# Patient Record
Sex: FEMALE | Race: OTHER | HISPANIC OR LATINO
[De-identification: names, ages, dates, MRNs, and addresses within clinical notes are randomized per-mention and may not be internally consistent; named-entity substitution may affect disease eponyms.]

---

## 2019-12-30 ENCOUNTER — APPOINTMENT (OUTPATIENT)
Dept: ORTHOPEDIC SURGERY | Facility: CLINIC | Age: 49
End: 2019-12-30
Payer: COMMERCIAL

## 2019-12-30 VITALS
SYSTOLIC BLOOD PRESSURE: 102 MMHG | BODY MASS INDEX: 22.08 KG/M2 | HEIGHT: 62 IN | WEIGHT: 120 LBS | HEART RATE: 94 BPM | DIASTOLIC BLOOD PRESSURE: 70 MMHG

## 2019-12-30 DIAGNOSIS — Z78.9 OTHER SPECIFIED HEALTH STATUS: ICD-10-CM

## 2019-12-30 DIAGNOSIS — Z82.61 FAMILY HISTORY OF ARTHRITIS: ICD-10-CM

## 2019-12-30 DIAGNOSIS — Z87.09 PERSONAL HISTORY OF OTHER DISEASES OF THE RESPIRATORY SYSTEM: ICD-10-CM

## 2019-12-30 PROCEDURE — 77071 MNL APPL STRS JT RADIOGRAPHY: CPT

## 2019-12-30 PROCEDURE — 27786 TREATMENT OF ANKLE FRACTURE: CPT | Mod: RT

## 2019-12-30 PROCEDURE — 99203 OFFICE O/P NEW LOW 30 MIN: CPT | Mod: 25,57

## 2019-12-30 PROCEDURE — 73610 X-RAY EXAM OF ANKLE: CPT | Mod: RT

## 2019-12-30 NOTE — DATA REVIEWED
[Imaging Present] : Present [de-identified] : History of as well as 3 x-rays today AP lateral and mortise view of the ankle show a distal fibula fracture Hull B. type fracture.\par \par Stress views done today with external rotation shows no obvious instability or significant talar tilt.

## 2019-12-30 NOTE — PHYSICAL EXAM
[FreeTextEntry1] : On exam, the RIGHT ankle has swelling over the lateral malleolus. She has some mild ecchymosis that her 2nd and 3rd toes. She has some minimal tenderness over the medial malleolus and deltoid posteriorly. She has moderate tenderness over the distal fibula itself. Just minimal tenderness over ATFL. She has mild pain with internal and external rotation as well as inversion and eversion. She has no pain proximally. She is neurovascularly intact. [General Appearance - Well Nourished] : well nourished [General Appearance - Well-Appearing] : Well appearing [Impaired Insight] : Insight and judgment were intact [Oriented To Time, Place, And Person] : Oriented to person, place, and time [Mood] : the mood was normal [Affect] : The affect was normal. [Heart Rate And Rhythm] : heart rate was normal and rhythm regular [Sclera] : the sclera and conjunctiva were normal [Neck Cervical Mass (___cm)] : no neck mass was observed [] : No respiratory distress [Antalgic Gait Right] : antalgic on the right [Abdomen Soft] : Soft [Limping On The Right] : limping on the right [Cane] : Uses cane for ambulation [Tenderness] : tenderness [Skin Changes - Describe changes:] : No skin changes noted [Masses] : no masses [Swelling] : swelling [Full ROM Unless otherwise noted:] : Full range of motion unless otherwise noted: [Normal] : No palpable lymph nodes in the inguinal and popliteal beds [LE  Motor Strength Normal unless otherwise noted:] : 5/5 strength in bilateral lower extemities unless otherwise noted. [2+] : right 2+

## 2019-12-30 NOTE — DISCUSSION/SUMMARY
[All Questions Answered] : Patient (and family) had all questions answered to an agreeable level of satisfaction [de-identified] : Is 10 days status post distal fibula fracture. This is a stable fracture\par \par The patient is to stay in the immobilization boot and do some minimal weightbearing for a couple of weeks. If her pain gets better she can start weightbearing. I want to see her again in 3 weeks we can hopefully transition her to an Aircast. This is closed management of a distal fibula fracture without manipulation.\par \par If imaging was ordered, the patient was told to make an appointment to review findings right after all imaging is completed.\par \par We discussed risks, benefits and alternatives. Rationale of care was reviewed and all questions were answered. Patient (and family) had all questions answered to her degree of the level of satisfaction. Patient (and family) expressed understanding and interest in proceeding with the plan as outlined.\par \par \par \par \par This note was done with a voice recognition transcription software and any typos are related to this rather than medical error.\par  [Interested in Proceeding] : Patient (and family) expressed understanding and interest in proceeding with the plan as outlined

## 2019-12-30 NOTE — HISTORY OF PRESENT ILLNESS
[FreeTextEntry1] : This is a 49-year-old female who comes in after a trip down some stairs on December 21. She inverted her ankle certainly significant pain. She was walking on them [facility] later who told her that there were no problems. She went and had significant swelling and went 2 days after this on the 26th and had an x-ray and was told she had a fibula fracture. She then came to me for further evaluation. She is in the boot currently. She is walking with a cane. [Stable] : stable [___ days] : [unfilled] day(s) ago [Walking] : worsened by walking [5] : currently ~his/her~ pain is 5 out of 10 [Joint Movement] : worsened by joint movement

## 2019-12-30 NOTE — REVIEW OF SYSTEMS
[Chills] : no chills [Feeling Tired] : not feeling tired [Fever] : no fever [Joint Pain] : joint pain [Joint Swelling] : joint swelling [Nl] : Hematologic/Lymphatic

## 2020-01-27 ENCOUNTER — APPOINTMENT (OUTPATIENT)
Dept: ORTHOPEDIC SURGERY | Facility: CLINIC | Age: 50
End: 2020-01-27
Payer: COMMERCIAL

## 2020-01-27 PROCEDURE — 99024 POSTOP FOLLOW-UP VISIT: CPT

## 2020-01-27 PROCEDURE — 73610 X-RAY EXAM OF ANKLE: CPT | Mod: RT

## 2020-01-28 NOTE — HISTORY OF PRESENT ILLNESS
[FreeTextEntry1] : Patient is almost 6 weeks out from her injury. She currently feels significantly better. She has been walking around with the boot. [Improving] : improving [1] : currently ~his/her~ pain is 1 out of 10 [Direct Pressure] : worsened by direct pressure [Rest] : relieved by rest

## 2020-01-28 NOTE — DISCUSSION/SUMMARY
[All Questions Answered] : Patient (and family) had all questions answered to an agreeable level of satisfaction [de-identified] : Patient is healed enough to bring her out of the boot and I placed her into an Aircast stirrup splint. She should wear this for the next 2-4 weeks for support. Recommended her to be weightbearing as tolerated and get back to regular activity. She is walking normally again. I will see her back in 2 months for repeat x-ray machine and to be seen sooner.\par \par If imaging was ordered, the patient was told to make an appointment to review findings right after all imaging is completed.\par \par We discussed risks, benefits and alternatives. Rationale of care was reviewed and all questions were answered. Patient (and family) had all questions answered to her degree of the level of satisfaction. Patient (and family) expressed understanding and interest in proceeding with the plan as outlined.\par \par \par \par \par This note was done with a voice recognition transcription software and any typos are related to this rather than medical error. [Interested in Proceeding] : Patient (and family) expressed understanding and interest in proceeding with the plan as outlined

## 2020-01-28 NOTE — DATA REVIEWED
[Imaging Present] : Present [de-identified] : X-rays today 3 views of the RIGHT ankle show the Hull B. minimally displaced distal fibula fracture in good position. Nothing is changed. There is no medial widening. Everything appears intact.

## 2020-01-28 NOTE — REVIEW OF SYSTEMS
[Chills] : no chills [Fever] : no fever [Feeling Tired] : not feeling tired [Joint Pain] : joint pain [Joint Swelling] : joint swelling [Nl] : Hematologic/Lymphatic

## 2020-01-28 NOTE — PHYSICAL EXAM
[FreeTextEntry1] : On exam the patient has good range of motion patient minimal tenderness to reveal the fracture site and this has no pain. She has normal pain with external rotation but no pain medially. She has a stable ankle. She is neurovascularly intact. [Oriented To Time, Place, And Person] : Oriented to person, place, and time [General Appearance - Well Nourished] : well nourished [General Appearance - Well-Appearing] : Well appearing [Affect] : The affect was normal. [Mood] : the mood was normal [Impaired Insight] : Insight and judgment were intact [Limping On The Right] : limping on the right [Sclera] : the sclera and conjunctiva were normal [Swelling] : no swelling [Tenderness] : tenderness [Skin Changes - Describe changes:] : No skin changes noted [Masses] : no masses [Normal] : Palpable DP and PT pulses, warm and pink with brisk capillary refill [Full ROM Unless otherwise noted:] : Full range of motion unless otherwise noted: [LE  Motor Strength Normal unless otherwise noted:] : 5/5 strength in bilateral lower extemities unless otherwise noted. [2+] : right 2+

## 2020-03-30 ENCOUNTER — APPOINTMENT (OUTPATIENT)
Dept: ORTHOPEDIC SURGERY | Facility: CLINIC | Age: 50
End: 2020-03-30

## 2020-06-06 ENCOUNTER — APPOINTMENT (OUTPATIENT)
Dept: OBGYN | Facility: CLINIC | Age: 50
End: 2020-06-06

## 2020-06-27 ENCOUNTER — APPOINTMENT (OUTPATIENT)
Dept: OBGYN | Facility: CLINIC | Age: 50
End: 2020-06-27

## 2020-07-11 ENCOUNTER — APPOINTMENT (OUTPATIENT)
Dept: ANTEPARTUM | Facility: CLINIC | Age: 50
End: 2020-07-11
Payer: COMMERCIAL

## 2020-07-11 PROCEDURE — 99386 PREV VISIT NEW AGE 40-64: CPT

## 2020-08-10 ENCOUNTER — APPOINTMENT (OUTPATIENT)
Dept: OBGYN | Facility: CLINIC | Age: 50
End: 2020-08-10

## 2021-07-01 ENCOUNTER — TRANSCRIPTION ENCOUNTER (OUTPATIENT)
Age: 51
End: 2021-07-01

## 2021-07-30 DIAGNOSIS — R87.810 CERVICAL HIGH RISK HUMAN PAPILLOMAVIRUS (HPV) DNA TEST POSITIVE: ICD-10-CM

## 2021-08-28 ENCOUNTER — LABORATORY RESULT (OUTPATIENT)
Age: 51
End: 2021-08-28

## 2021-08-28 ENCOUNTER — APPOINTMENT (OUTPATIENT)
Dept: OBGYN | Facility: CLINIC | Age: 51
End: 2021-08-28
Payer: COMMERCIAL

## 2021-08-28 VITALS
DIASTOLIC BLOOD PRESSURE: 73 MMHG | HEIGHT: 62 IN | BODY MASS INDEX: 22.45 KG/M2 | SYSTOLIC BLOOD PRESSURE: 109 MMHG | WEIGHT: 122 LBS

## 2021-08-28 DIAGNOSIS — S82.831D OTHER FRACTURE OF UPPER AND LOWER END OF RIGHT FIBULA, SUBSEQUENT ENCOUNTER FOR CLOSED FRACTURE WITH ROUTINE HEALING: ICD-10-CM

## 2021-08-28 DIAGNOSIS — S82.831A OTHER FRACTURE OF UPPER AND LOWER END OF RIGHT FIBULA, INITIAL ENCOUNTER FOR CLOSED FRACTURE: ICD-10-CM

## 2021-08-28 DIAGNOSIS — Z82.49 FAMILY HISTORY OF ISCHEMIC HEART DISEASE AND OTHER DISEASES OF THE CIRCULATORY SYSTEM: ICD-10-CM

## 2021-08-28 PROCEDURE — 99396 PREV VISIT EST AGE 40-64: CPT

## 2021-08-28 RX ORDER — SERTRALINE HYDROCHLORIDE 25 MG/1
TABLET, FILM COATED ORAL
Refills: 0 | Status: DISCONTINUED | COMMUNITY
End: 2021-08-28

## 2021-08-28 NOTE — PHYSICAL EXAM
[Appropriately responsive] : appropriately responsive [Alert] : alert [No Lymphadenopathy] : no lymphadenopathy [No Acute Distress] : no acute distress [Regular Rate Rhythm] : regular rate rhythm [No Murmurs] : no murmurs [Soft] : soft [Clear to Auscultation B/L] : clear to auscultation bilaterally [Non-tender] : non-tender [Non-distended] : non-distended [No HSM] : No HSM [No Lesions] : no lesions [No Mass] : no mass [Oriented x3] : oriented x3 [Examination Of The Breasts] : a normal appearance [No Masses] : no breast masses were palpable [Labia Majora] : normal [Labia Minora] : normal [Normal] : normal [Uterine Adnexae] : normal

## 2021-08-30 PROBLEM — S82.831A CLOSED FRACTURE OF DISTAL END OF RIGHT FIBULA, UNSPECIFIED FRACTURE MORPHOLOGY, INITIAL ENCOUNTER: Status: RESOLVED | Noted: 2019-12-30 | Resolved: 2021-08-30

## 2021-08-30 PROBLEM — S82.831D CLOSED FRACTURE OF DISTAL END OF RIGHT FIBULA WITH ROUTINE HEALING, UNSPECIFIED FRACTURE MORPHOLOGY, SUBSEQUENT ENCOUNTER: Status: RESOLVED | Noted: 2020-01-27 | Resolved: 2021-08-30

## 2021-08-30 LAB
C TRACH RRNA SPEC QL NAA+PROBE: NOT DETECTED
N GONORRHOEA RRNA SPEC QL NAA+PROBE: NOT DETECTED
SOURCE AMPLIFICATION: NORMAL

## 2021-08-30 NOTE — HISTORY OF PRESENT ILLNESS
[FreeTextEntry1] : Patient is a 51 year y/o P1 presenting for an annual visit. Patient has not had a period since August 2020. She endorses irregular periods for 6 months leading up to this. Now she only has occasional spotting. She also complains of mood swings and hot flashes. She is otherwise feeling well. She denies vaginal itching, odor and discharge. Denies urinary urgency, frequency and dysuria.

## 2021-08-30 NOTE — PLAN
[FreeTextEntry1] : 51 year y/o P1 presenting for annual exam. Presentation consistent with patient having entered menopause.\par -uterus normal size and mobile\par -f/u PAP and GC/CT done today\par -requisition given for diagnostic mammogram and bilateral breast sono as patient has dense breast tissue\par -counseled on exercise, Ca, and Vitamin D\par -f/u PRN\par \par =============================================\par I, Joana Arango, acted solely as a scribe for Dr. Royce Goldman on Aug 28 2021 10:30AM. All medical entries made by the Scribe were at my, Dr. Royce Jerez's, direction and personally dictated by me on Aug 28 2021 10:30AM . I have reviewed the chart and agree that the record accurately reflects my personal performance of the history, physical exam, assessment, and plan. I have also personally directed, reviewed, and agreed with the chart.\par =============================================

## 2021-09-09 LAB — CYTOLOGY CVX/VAG DOC THIN PREP: ABNORMAL

## 2021-09-28 DIAGNOSIS — Z00.00 ENCOUNTER FOR GENERAL ADULT MEDICAL EXAMINATION W/OUT ABNORMAL FINDINGS: ICD-10-CM

## 2021-10-26 ENCOUNTER — NON-APPOINTMENT (OUTPATIENT)
Age: 51
End: 2021-10-26

## 2021-11-02 ENCOUNTER — NON-APPOINTMENT (OUTPATIENT)
Age: 51
End: 2021-11-02

## 2021-11-02 ENCOUNTER — APPOINTMENT (OUTPATIENT)
Dept: SURGICAL ONCOLOGY | Facility: CLINIC | Age: 51
End: 2021-11-02
Payer: COMMERCIAL

## 2021-11-02 VITALS
BODY MASS INDEX: 22.45 KG/M2 | OXYGEN SATURATION: 96 % | WEIGHT: 122 LBS | HEART RATE: 77 BPM | DIASTOLIC BLOOD PRESSURE: 73 MMHG | SYSTOLIC BLOOD PRESSURE: 112 MMHG | HEIGHT: 62 IN | RESPIRATION RATE: 16 BRPM | TEMPERATURE: 96.7 F

## 2021-11-02 PROCEDURE — 99204 OFFICE O/P NEW MOD 45 MIN: CPT

## 2021-11-02 NOTE — PHYSICAL EXAM
[Normal] : supple, no neck mass and thyroid not enlarged [Normal Supraclavicular Lymph Nodes] : normal supraclavicular lymph nodes [Normal Axillary Lymph Nodes] : normal axillary lymph nodes [Normal] : oriented to person, place and time, with appropriate affect [FreeTextEntry1] : AB present during exam  [de-identified] : Normal S1, S2.  Regular rate and rhythm.  [de-identified] : Complete breast exam performed in supine and upright positions.  No palpable masses, tenderness, nipple discharge, inversion, deviation or enlarged axillary or supraclavicular lymph nodes bilaterally.  [de-identified] : Clear breath sounds bilaterally, normal respiratory effort.

## 2021-11-02 NOTE — HISTORY OF PRESENT ILLNESS
[de-identified] :  51 year old female presents for an initial consultation, referred by Dr. Royce Goldman.\par \par A routine mammo performed on 7/17/21 revealed a spiculated mass in the right breast at the 12:00, 5 cmFN. Spot compressions views and target sonography recommended to determine if this mass is visualized sonographically unremarkable amenable to a sonographic guided core biopsy. If this is not identified sonographically then stereotactic biopsy recommended for definitive histologic diagnosis. BIRADS 0. \par \par On 09/17/21 she underwent a Diagnostic mammo and US which revealed persistent spiculated like mass in the right breast at about 12:00 corresponding to a 1.5 cm irregular hypoechoic nodule with posterior shadowing on ultrasound.  There is a 1.3 cm hypoechoic nodule questionable irregularity 10:00 right breast 2 cmFN. Multiple other small nodules bilaterally suggesting complicated and simple cyst. Bilateral mild ductal ectasia retroareolar region. Biopsy recommended. BIRADS 5.  Short term follow up bilateral ultrasound recommended to assess stability of additional nodules.\par \par 2 US guided core biopsies were performed on 10/19/21 which revealed:\par 1) Right breast 12:00 3 cmFN: invasive mammary carcinoma, moderately differentiated, spanning at least 9 mm in this material. Primary carcinoma in situ with apocrine features. \par 2) Right breast 10:00 2 cmFN: Invasive mammary carcinoma, moderately differentiated, spanning at least 7 mm in this material. 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 Lymphovascular invasion is identified.  \par ***ER/MD/HER2 status pending***\par \par She has a prior history of 2 benign core biopsies of the breast.  Her past medical history includes anxiety and asthma.  She has a family history of breast cancer in a paternal first cousin in her 30's and maternal first cousin in her 50's.  She currently takes OTC estroven for vasomotor symptoms.  She states that she had not had a menstrual period for over a year, but got her period after the recent biopsy.\par \par She denies any palpable breast masses, nipple discharge, inversion or skin change.

## 2021-11-02 NOTE — ASSESSMENT
[FreeTextEntry1] : IMP:\par Newly diagnosed invasive mammary carcinoma of the right breast x 2 sites (12:00, N+3/ 10:00, N+2)\par ER/RI/HER2 status pending\par \par PLAN:\par The patient and I discussed the surgical management of breast cancer. I explained that breast cancer can be treated with 2 main surgical approaches. One is breast conserving therapy. The other is mastectomy with or without immediate reconstruction by a plastic surgeon. Breast conserving therapy involves wide excision of the involved area. Negative margins must be achieved with this wide excision. In addition, evaluation of the lymph nodes either with a sentinel lymph node biopsy or an axillary lymph node dissection may be required. This treatment usually requires postoperative radiation to the breast. Treatment with mastectomy also involves evaluation of the lymph node with a sentinel lymph node biopsy or axillary lymph node dissection. Post operative radiation therapy may be needed even after mastectomy in certain advanced cases.\par \par Breast MRI\par Genetic testing\par 6 month follow up ultrasound to assess stability of additional probable benign nodules\par \par

## 2021-11-08 ENCOUNTER — APPOINTMENT (OUTPATIENT)
Dept: OBGYN | Facility: CLINIC | Age: 51
End: 2021-11-08
Payer: COMMERCIAL

## 2021-11-08 VITALS — BODY MASS INDEX: 23.05 KG/M2 | WEIGHT: 126 LBS | DIASTOLIC BLOOD PRESSURE: 80 MMHG | SYSTOLIC BLOOD PRESSURE: 115 MMHG

## 2021-11-08 PROCEDURE — 76857 US EXAM PELVIC LIMITED: CPT

## 2021-11-08 PROCEDURE — 76830 TRANSVAGINAL US NON-OB: CPT

## 2021-11-08 PROCEDURE — 76376 3D RENDER W/INTRP POSTPROCES: CPT

## 2021-11-10 ENCOUNTER — OUTPATIENT (OUTPATIENT)
Dept: OUTPATIENT SERVICES | Facility: HOSPITAL | Age: 51
LOS: 1 days | End: 2021-11-10

## 2021-11-10 ENCOUNTER — APPOINTMENT (OUTPATIENT)
Dept: MRI IMAGING | Facility: CLINIC | Age: 51
End: 2021-11-10
Payer: COMMERCIAL

## 2021-11-10 VITALS
SYSTOLIC BLOOD PRESSURE: 110 MMHG | OXYGEN SATURATION: 99 % | RESPIRATION RATE: 16 BRPM | TEMPERATURE: 98 F | DIASTOLIC BLOOD PRESSURE: 70 MMHG | HEIGHT: 62 IN | HEART RATE: 88 BPM | WEIGHT: 123.9 LBS

## 2021-11-10 DIAGNOSIS — Z98.891 HISTORY OF UTERINE SCAR FROM PREVIOUS SURGERY: Chronic | ICD-10-CM

## 2021-11-10 DIAGNOSIS — N95.0 POSTMENOPAUSAL BLEEDING: ICD-10-CM

## 2021-11-10 DIAGNOSIS — Z98.890 OTHER SPECIFIED POSTPROCEDURAL STATES: Chronic | ICD-10-CM

## 2021-11-10 LAB
ALBUMIN SERPL ELPH-MCNC: 4.8 G/DL — SIGNIFICANT CHANGE UP (ref 3.3–5)
ALP SERPL-CCNC: 79 U/L — SIGNIFICANT CHANGE UP (ref 40–120)
ALT FLD-CCNC: 9 U/L — SIGNIFICANT CHANGE UP (ref 4–33)
ANION GAP SERPL CALC-SCNC: 10 MMOL/L — SIGNIFICANT CHANGE UP (ref 7–14)
AST SERPL-CCNC: 17 U/L — SIGNIFICANT CHANGE UP (ref 4–32)
BILIRUB SERPL-MCNC: 0.3 MG/DL — SIGNIFICANT CHANGE UP (ref 0.2–1.2)
BUN SERPL-MCNC: 18 MG/DL — SIGNIFICANT CHANGE UP (ref 7–23)
CALCIUM SERPL-MCNC: 9.4 MG/DL — SIGNIFICANT CHANGE UP (ref 8.4–10.5)
CHLORIDE SERPL-SCNC: 107 MMOL/L — SIGNIFICANT CHANGE UP (ref 98–107)
CO2 SERPL-SCNC: 25 MMOL/L — SIGNIFICANT CHANGE UP (ref 22–31)
CREAT SERPL-MCNC: 0.83 MG/DL — SIGNIFICANT CHANGE UP (ref 0.5–1.3)
GLUCOSE SERPL-MCNC: 92 MG/DL — SIGNIFICANT CHANGE UP (ref 70–99)
HCG SERPL-ACNC: <5 MIU/ML — SIGNIFICANT CHANGE UP
HCT VFR BLD CALC: 39.6 % — SIGNIFICANT CHANGE UP (ref 34.5–45)
HGB BLD-MCNC: 12.7 G/DL — SIGNIFICANT CHANGE UP (ref 11.5–15.5)
MCHC RBC-ENTMCNC: 31.4 PG — SIGNIFICANT CHANGE UP (ref 27–34)
MCHC RBC-ENTMCNC: 32.1 GM/DL — SIGNIFICANT CHANGE UP (ref 32–36)
MCV RBC AUTO: 97.8 FL — SIGNIFICANT CHANGE UP (ref 80–100)
NRBC # BLD: 0 /100 WBCS — SIGNIFICANT CHANGE UP
NRBC # FLD: 0 K/UL — SIGNIFICANT CHANGE UP
PLATELET # BLD AUTO: 305 K/UL — SIGNIFICANT CHANGE UP (ref 150–400)
POTASSIUM SERPL-MCNC: 4.7 MMOL/L — SIGNIFICANT CHANGE UP (ref 3.5–5.3)
POTASSIUM SERPL-SCNC: 4.7 MMOL/L — SIGNIFICANT CHANGE UP (ref 3.5–5.3)
PROT SERPL-MCNC: 7.9 G/DL — SIGNIFICANT CHANGE UP (ref 6–8.3)
RBC # BLD: 4.05 M/UL — SIGNIFICANT CHANGE UP (ref 3.8–5.2)
RBC # FLD: 12.2 % — SIGNIFICANT CHANGE UP (ref 10.3–14.5)
SODIUM SERPL-SCNC: 142 MMOL/L — SIGNIFICANT CHANGE UP (ref 135–145)
WBC # BLD: 7.7 K/UL — SIGNIFICANT CHANGE UP (ref 3.8–10.5)
WBC # FLD AUTO: 7.7 K/UL — SIGNIFICANT CHANGE UP (ref 3.8–10.5)

## 2021-11-10 PROCEDURE — A9585: CPT

## 2021-11-10 PROCEDURE — 77049 MRI BREAST C-+ W/CAD BI: CPT

## 2021-11-10 NOTE — H&P PST ADULT - NSICDXPASTSURGICALHX_GEN_ALL_CORE_FT
PAST SURGICAL HISTORY:  History of  section 2012     PAST SURGICAL HISTORY:  History of  section 2012    S/P breast biopsy right

## 2021-11-10 NOTE — H&P PST ADULT - PROBLEM SELECTOR PLAN 1
Schedule for Dilation Curettage Hysteroscopy tentatively on 11/12/2021. Pre op instructions, famotidine given and explained. Pt verbalized understanding.  Pt confirmed schedule appt for Covid test preop  Pt instructed to bring urine specimen on day of surgery, urine cup given to pt who verbalized understanding.

## 2021-11-10 NOTE — H&P PST ADULT - HISTORY OF PRESENT ILLNESS
stopped menstruating x 1 year, period returned on 10/20/2021    mammo on 07/2021, S/P US of right breast. S/P biopsy  52 y/o female with h/o Anxiety disorder presents to PST for pre op evaluation stated that she "stopped menstruating x 1 year but the period returned on 10/20/2021. Pre op diagnosis: Post menopausal bleeding. Now schedule for Dilation and Curettage hysteroscopy

## 2021-11-10 NOTE — H&P PST ADULT - ATTENDING COMMENTS
Agree with above. Pt presenting with PMB, thickened endometrial lining on sono. Plan for hysteroscopy, D&C.    K MD Lewis Agree with above. Pt presenting with PMB. Plan for hysteroscopy, D&C.    K MD Lewis Agree with above. Pt presenting with PMB. Plan for hysteroscopy, D&C. Written consent obtained.    DIEGO Saucedo MD

## 2021-11-10 NOTE — H&P PST ADULT - NSANTHOSAYNRD_GEN_A_CORE
No. SERGEY screening performed.  STOP BANG Legend: 0-2 = LOW Risk; 3-4 = INTERMEDIATE Risk; 5-8 = HIGH Risk

## 2021-11-10 NOTE — H&P PST ADULT - NEGATIVE OPHTHALMOLOGIC SYMPTOMS
no diplopia/no photophobia/no lacrimation L/no lacrimation R/no blurred vision L/no blurred vision R/no discharge L/no discharge R/no pain L/no pain R/no irritation L/no irritation R/no loss of vision R

## 2021-11-11 ENCOUNTER — TRANSCRIPTION ENCOUNTER (OUTPATIENT)
Age: 51
End: 2021-11-11

## 2021-11-11 ENCOUNTER — APPOINTMENT (OUTPATIENT)
Dept: OBGYN | Facility: CLINIC | Age: 51
End: 2021-11-11
Payer: COMMERCIAL

## 2021-11-11 VITALS
WEIGHT: 123.9 LBS | SYSTOLIC BLOOD PRESSURE: 106 MMHG | OXYGEN SATURATION: 98 % | HEIGHT: 62 IN | TEMPERATURE: 98 F | DIASTOLIC BLOOD PRESSURE: 62 MMHG | RESPIRATION RATE: 20 BRPM | HEART RATE: 72 BPM

## 2021-11-11 PROBLEM — F41.9 ANXIETY DISORDER, UNSPECIFIED: Chronic | Status: ACTIVE | Noted: 2021-11-10

## 2021-11-11 PROCEDURE — 99212 OFFICE O/P EST SF 10 MIN: CPT

## 2021-11-12 ENCOUNTER — RESULT REVIEW (OUTPATIENT)
Age: 51
End: 2021-11-12

## 2021-11-12 ENCOUNTER — OUTPATIENT (OUTPATIENT)
Dept: OUTPATIENT SERVICES | Facility: HOSPITAL | Age: 51
LOS: 1 days | End: 2021-11-12
Payer: MEDICAID

## 2021-11-12 ENCOUNTER — OUTPATIENT (OUTPATIENT)
Dept: OUTPATIENT SERVICES | Facility: HOSPITAL | Age: 51
LOS: 1 days | Discharge: HOME | End: 2021-11-12
Payer: COMMERCIAL

## 2021-11-12 VITALS
SYSTOLIC BLOOD PRESSURE: 107 MMHG | OXYGEN SATURATION: 98 % | DIASTOLIC BLOOD PRESSURE: 64 MMHG | RESPIRATION RATE: 19 BRPM | HEART RATE: 78 BPM

## 2021-11-12 DIAGNOSIS — N95.0 POSTMENOPAUSAL BLEEDING: ICD-10-CM

## 2021-11-12 DIAGNOSIS — Z98.890 OTHER SPECIFIED POSTPROCEDURAL STATES: Chronic | ICD-10-CM

## 2021-11-12 DIAGNOSIS — Z98.891 HISTORY OF UTERINE SCAR FROM PREVIOUS SURGERY: Chronic | ICD-10-CM

## 2021-11-12 DIAGNOSIS — C80.1 MALIGNANT (PRIMARY) NEOPLASM, UNSPECIFIED: ICD-10-CM

## 2021-11-12 LAB — SARS-COV-2 N GENE NPH QL NAA+PROBE: NOT DETECTED

## 2021-11-12 PROCEDURE — 58558 HYSTEROSCOPY BIOPSY: CPT

## 2021-11-12 PROCEDURE — 88305 TISSUE EXAM BY PATHOLOGIST: CPT | Mod: 26,59

## 2021-11-12 PROCEDURE — 88321 CONSLTJ&REPRT SLD PREP ELSWR: CPT

## 2021-11-12 NOTE — ASU DISCHARGE PLAN (ADULT/PEDIATRIC) - CARE PROVIDER_API CALL
Slime Saucedo)  Susi BARNES  [phone] [place] [address]  Phone: [phone]  Fax: [phone]  Follow Up Time:

## 2021-11-12 NOTE — BRIEF OPERATIVE NOTE - NSICDXBRIEFPROCEDURE_GEN_ALL_CORE_FT
PROCEDURES:  Dilation and curettage, uterus 12-Nov-2021 16:43:10  Chica Estrella  Diagnostic hysteroscopy 12-Nov-2021 16:44:44  Chica Estrella  Hysteroscopic polypectomy using MyoSure tissue removal system 12-Nov-2021 16:45:13  Chica Estrella

## 2021-11-12 NOTE — ASU DISCHARGE PLAN (ADULT/PEDIATRIC) - MEDICATION INSTRUCTIONS
You can take up to 600mg Ibuprofen every 6 hours and/or tylenol 975mg every 6 hours. No change to home medications.

## 2021-11-12 NOTE — BRIEF OPERATIVE NOTE - OPERATION/FINDINGS
EUA: 6 wk size anteverted uterus   Intracavitary: 2 endometrial polyps noted at the fundus. Endometrium otherwise grossly normal. B/l ostia visualized and wnl. EUA: 6 wk size anteverted uterus   Intracavitary: 3cm endometrial polyps noted at the fundus. Endometrium otherwise grossly normal. B/l ostia visualized and wnl.

## 2021-11-15 ENCOUNTER — NON-APPOINTMENT (OUTPATIENT)
Age: 51
End: 2021-11-15

## 2021-11-15 ENCOUNTER — APPOINTMENT (OUTPATIENT)
Dept: CT IMAGING | Facility: CLINIC | Age: 51
End: 2021-11-15
Payer: COMMERCIAL

## 2021-11-15 PROCEDURE — 74177 CT ABD & PELVIS W/CONTRAST: CPT

## 2021-11-15 PROCEDURE — 71260 CT THORAX DX C+: CPT

## 2021-11-16 LAB
SURGICAL PATHOLOGY STUDY: SIGNIFICANT CHANGE UP
SURGICAL PATHOLOGY STUDY: SIGNIFICANT CHANGE UP

## 2021-11-23 ENCOUNTER — APPOINTMENT (OUTPATIENT)
Dept: OBGYN | Facility: CLINIC | Age: 51
End: 2021-11-23
Payer: COMMERCIAL

## 2021-11-23 VITALS
HEIGHT: 62 IN | BODY MASS INDEX: 22.82 KG/M2 | WEIGHT: 124 LBS | DIASTOLIC BLOOD PRESSURE: 73 MMHG | SYSTOLIC BLOOD PRESSURE: 107 MMHG

## 2021-11-23 PROCEDURE — 99212 OFFICE O/P EST SF 10 MIN: CPT

## 2021-12-09 ENCOUNTER — OUTPATIENT (OUTPATIENT)
Dept: OUTPATIENT SERVICES | Facility: HOSPITAL | Age: 51
LOS: 1 days | End: 2021-12-09
Payer: COMMERCIAL

## 2021-12-09 ENCOUNTER — APPOINTMENT (OUTPATIENT)
Dept: CT IMAGING | Facility: CLINIC | Age: 51
End: 2021-12-09
Payer: COMMERCIAL

## 2021-12-09 DIAGNOSIS — Z98.891 HISTORY OF UTERINE SCAR FROM PREVIOUS SURGERY: Chronic | ICD-10-CM

## 2021-12-09 DIAGNOSIS — Z98.890 OTHER SPECIFIED POSTPROCEDURAL STATES: Chronic | ICD-10-CM

## 2021-12-09 DIAGNOSIS — Z00.00 ENCOUNTER FOR GENERAL ADULT MEDICAL EXAMINATION WITHOUT ABNORMAL FINDINGS: ICD-10-CM

## 2021-12-09 PROCEDURE — 74174 CTA ABD&PLVS W/CONTRAST: CPT

## 2021-12-09 PROCEDURE — 74174 CTA ABD&PLVS W/CONTRAST: CPT | Mod: 26

## 2021-12-16 ENCOUNTER — OUTPATIENT (OUTPATIENT)
Dept: OUTPATIENT SERVICES | Facility: HOSPITAL | Age: 51
LOS: 1 days | End: 2021-12-16
Payer: COMMERCIAL

## 2021-12-16 VITALS
RESPIRATION RATE: 16 BRPM | DIASTOLIC BLOOD PRESSURE: 80 MMHG | HEART RATE: 79 BPM | TEMPERATURE: 98 F | HEIGHT: 62 IN | SYSTOLIC BLOOD PRESSURE: 118 MMHG | WEIGHT: 123.9 LBS | OXYGEN SATURATION: 98 %

## 2021-12-16 DIAGNOSIS — C50.911 MALIGNANT NEOPLASM OF UNSPECIFIED SITE OF RIGHT FEMALE BREAST: ICD-10-CM

## 2021-12-16 DIAGNOSIS — Z98.891 HISTORY OF UTERINE SCAR FROM PREVIOUS SURGERY: Chronic | ICD-10-CM

## 2021-12-16 DIAGNOSIS — Z98.890 OTHER SPECIFIED POSTPROCEDURAL STATES: Chronic | ICD-10-CM

## 2021-12-16 DIAGNOSIS — F41.9 ANXIETY DISORDER, UNSPECIFIED: ICD-10-CM

## 2021-12-16 LAB
ALBUMIN SERPL ELPH-MCNC: 4.9 G/DL — SIGNIFICANT CHANGE UP (ref 3.3–5)
ALP SERPL-CCNC: 89 U/L — SIGNIFICANT CHANGE UP (ref 40–120)
ALT FLD-CCNC: 10 U/L — SIGNIFICANT CHANGE UP (ref 4–33)
ANION GAP SERPL CALC-SCNC: 12 MMOL/L — SIGNIFICANT CHANGE UP (ref 7–14)
AST SERPL-CCNC: 18 U/L — SIGNIFICANT CHANGE UP (ref 4–32)
BILIRUB SERPL-MCNC: 0.2 MG/DL — SIGNIFICANT CHANGE UP (ref 0.2–1.2)
BLD GP AB SCN SERPL QL: NEGATIVE — SIGNIFICANT CHANGE UP
BUN SERPL-MCNC: 12 MG/DL — SIGNIFICANT CHANGE UP (ref 7–23)
CALCIUM SERPL-MCNC: 9.4 MG/DL — SIGNIFICANT CHANGE UP (ref 8.4–10.5)
CHLORIDE SERPL-SCNC: 107 MMOL/L — SIGNIFICANT CHANGE UP (ref 98–107)
CO2 SERPL-SCNC: 23 MMOL/L — SIGNIFICANT CHANGE UP (ref 22–31)
CREAT SERPL-MCNC: 0.72 MG/DL — SIGNIFICANT CHANGE UP (ref 0.5–1.3)
GLUCOSE SERPL-MCNC: 86 MG/DL — SIGNIFICANT CHANGE UP (ref 70–99)
HCG SERPL-ACNC: <5 MIU/ML — SIGNIFICANT CHANGE UP
HCT VFR BLD CALC: 38.7 % — SIGNIFICANT CHANGE UP (ref 34.5–45)
HGB BLD-MCNC: 12.5 G/DL — SIGNIFICANT CHANGE UP (ref 11.5–15.5)
MCHC RBC-ENTMCNC: 31 PG — SIGNIFICANT CHANGE UP (ref 27–34)
MCHC RBC-ENTMCNC: 32.3 GM/DL — SIGNIFICANT CHANGE UP (ref 32–36)
MCV RBC AUTO: 96 FL — SIGNIFICANT CHANGE UP (ref 80–100)
NRBC # BLD: 0 /100 WBCS — SIGNIFICANT CHANGE UP
NRBC # FLD: 0 K/UL — SIGNIFICANT CHANGE UP
PLATELET # BLD AUTO: 219 K/UL — SIGNIFICANT CHANGE UP (ref 150–400)
POTASSIUM SERPL-MCNC: 3.5 MMOL/L — SIGNIFICANT CHANGE UP (ref 3.5–5.3)
POTASSIUM SERPL-SCNC: 3.5 MMOL/L — SIGNIFICANT CHANGE UP (ref 3.5–5.3)
PROT SERPL-MCNC: 7.6 G/DL — SIGNIFICANT CHANGE UP (ref 6–8.3)
RBC # BLD: 4.03 M/UL — SIGNIFICANT CHANGE UP (ref 3.8–5.2)
RBC # FLD: 12.3 % — SIGNIFICANT CHANGE UP (ref 10.3–14.5)
RH IG SCN BLD-IMP: POSITIVE — SIGNIFICANT CHANGE UP
SODIUM SERPL-SCNC: 142 MMOL/L — SIGNIFICANT CHANGE UP (ref 135–145)
WBC # BLD: 4.85 K/UL — SIGNIFICANT CHANGE UP (ref 3.8–10.5)
WBC # FLD AUTO: 4.85 K/UL — SIGNIFICANT CHANGE UP (ref 3.8–10.5)

## 2021-12-16 PROCEDURE — 93010 ELECTROCARDIOGRAM REPORT: CPT

## 2021-12-16 RX ORDER — CLONAZEPAM 1 MG
0 TABLET ORAL
Qty: 0 | Refills: 0 | DISCHARGE

## 2021-12-16 RX ORDER — SODIUM CHLORIDE 9 MG/ML
1000 INJECTION, SOLUTION INTRAVENOUS
Refills: 0 | Status: DISCONTINUED | OUTPATIENT
Start: 2022-01-21 | End: 2022-01-22

## 2021-12-16 NOTE — H&P PST ADULT - REASON FOR ADMISSION
" cancer of the right breast " "I have  cancer of the right breast , I am having a double mastectomy "

## 2021-12-16 NOTE — H&P PST ADULT - SYMPTOMS
none Pt denies cp, denies palpitations, denies sob . Pt reports feeling " racing heart at times "/none Pt denies cp, denies palpitations, denies sob . Pt reports feeling " racing heart at times ". In PST ; pt denies cp, denies palpitations, denies sob/none

## 2021-12-16 NOTE — H&P PST ADULT - NSICDXPASTMEDICALHX_GEN_ALL_CORE_FT
PAST MEDICAL HISTORY:  Anxiety disorder     Asthma last inhaler > 5 years ago    Breast cancer, right

## 2021-12-16 NOTE — H&P PST ADULT - HISTORY OF PRESENT ILLNESS
Pt is a 51 y.o.; pt s/p mammogram ; pt reports right breast mass ; pt s/p biopsy " it is cancer " Pt to surgeon ; pt now presents for Bilateral Total Mastectomy  Pt is a 51 y.o.; pt s/p mammogram ; pt reports right breast mass ; pt s/p biopsy " it is cancer " Pt to surgeon ; pt now presents for Bilateral Total Mastectomies , Bilateral Axillary [place] Lymph Node Biopsies , Possible Bilateral Axillary Node Dissections . Bilateral Breast Reconstruction Deep Inferior Epigastric  Flaps  Pt is a 51 y.o.; pt s/p mammogram ; pt reports right breast mass ; pt s/p biopsy " it is cancer " Pt to surgeon ; pt now presents for Bilateral Total Mastectomies , Bilateral Axillary [place] Lymph Node Biopsies , Possible Bilateral Axillary Node Dissections . Bilateral Breast Reconstruction Deep Inferior Epigastric  Flaps     Pt denies breast pain, denies nipple discharge

## 2021-12-16 NOTE — H&P PST ADULT - PROBLEM SELECTOR PLAN 1
Bilateral Total Mastectomies, Bilateral Axillary [place] Lymph Node Biopsies , Bilateral Axillary Node Dissections  Bilateral Breast Reconstructions Deep Inferior Epigastric  Flaps    Pre op instructions reviewed with pt , pt verbalized good understanding of pre op instructions    EKG done ; noted to be abnormal ; reviewed with Dr Ann  In PST ; Pt denies cp, denies palpitations, denies sob  , vss , pt in nad   Faxed to Dr Peres ; s/w  Dr Peres. Appt made for pt to see Dr Peres     Call to surgeons office ; s/w Tamar ; to make surgeon aware Bilateral Total Mastectomies, Bilateral Axillary [place] Lymph Node Biopsies , Bilateral Axillary Node Dissections  Bilateral Breast Reconstructions Deep Inferior Epigastric  Flaps 12/27/21 .     Pre op instructions reviewed with pt , pt verbalized good understanding of pre op instructions    EKG done  ; reviewed with Dr Ann  In PST ; Pt denies cp, denies palpitations, denies sob  , vss , pt in nad   Faxed to Dr Peres ; Received & reviewed by Dr Peres , s/w  Dr Peres ; appt made for pt to see Dr Peres   Call to surgeons office ; s/w Tamar ; to make surgeon aware Bilateral Total Mastectomies, Bilateral Axillary [place] Lymph Node Biopsies , Bilateral Axillary Node Dissections  Bilateral Breast Reconstructions Deep Inferior Epigastric  Flaps 12/27/21 .     Pre op instructions reviewed with pt , pt verbalized good understanding of pre op instructions    EKG done  ; reviewed with Dr Ann  In PST ; Pt denies cp, denies palpitations, denies sob . Pt offers no c/o Pt vss , pt in nad   Faxed to Dr Peres ; Received & reviewed by Dr Peres , s/w  Dr Peres ; appt made for pt to see Dr Peres   Call to surgeons office ; s/w Tamar ; to make surgeon aware Bilateral Total Mastectomies, Bilateral Axillary [place] Lymph Node Biopsies , Possible Bilateral Axillary Node Dissections Bilateral Breast Reconstructions Deep Inferior Epigastric  Flaps 12/27/21 .     Pre op instructions reviewed with pt , pt verbalized good understanding of pre op instructions    EKG done  ; reviewed with Dr Ann  In PST ; Pt denies cp, denies palpitations, denies sob . Pt offers no c/o Pt vss , pt in nad   Faxed to Dr Peres ; Received & reviewed by Dr Peres , s/w  Dr Peres ; appt made for pt to see Dr Peres   Call to surgeons office ; s/w Tamar ; to make surgeon aware

## 2021-12-16 NOTE — H&P PST ADULT - NEGATIVE CARDIOVASCULAR SYMPTOMS
no chest pain/no palpitations/no dyspnea on exertion/no orthopnea/no paroxysmal nocturnal dyspnea/no peripheral edema/no claudication In pst/no chest pain/no palpitations/no dyspnea on exertion/no orthopnea/no paroxysmal nocturnal dyspnea/no peripheral edema/no claudication

## 2021-12-16 NOTE — H&P PST ADULT - NEGATIVE PSYCHIATRIC SYMPTOMS
no suicidal ideation/no insomnia/no memory loss no suicidal ideation/no depression/no insomnia/no memory loss

## 2021-12-27 ENCOUNTER — APPOINTMENT (OUTPATIENT)
Dept: NUCLEAR MEDICINE | Facility: HOSPITAL | Age: 51
End: 2021-12-27

## 2021-12-27 ENCOUNTER — APPOINTMENT (OUTPATIENT)
Dept: SURGICAL ONCOLOGY | Facility: HOSPITAL | Age: 51
End: 2021-12-27

## 2021-12-30 PROBLEM — C50.911 MALIGNANT NEOPLASM OF UNSPECIFIED SITE OF RIGHT FEMALE BREAST: Chronic | Status: ACTIVE | Noted: 2021-12-16

## 2022-01-06 ENCOUNTER — OUTPATIENT (OUTPATIENT)
Dept: OUTPATIENT SERVICES | Facility: HOSPITAL | Age: 52
LOS: 1 days | End: 2022-01-06

## 2022-01-06 VITALS
RESPIRATION RATE: 14 BRPM | DIASTOLIC BLOOD PRESSURE: 60 MMHG | HEIGHT: 63.5 IN | SYSTOLIC BLOOD PRESSURE: 100 MMHG | OXYGEN SATURATION: 98 % | HEART RATE: 84 BPM | WEIGHT: 128.09 LBS | TEMPERATURE: 97 F

## 2022-01-06 DIAGNOSIS — Z98.890 OTHER SPECIFIED POSTPROCEDURAL STATES: Chronic | ICD-10-CM

## 2022-01-06 DIAGNOSIS — Z98.891 HISTORY OF UTERINE SCAR FROM PREVIOUS SURGERY: Chronic | ICD-10-CM

## 2022-01-06 DIAGNOSIS — C50.911 MALIGNANT NEOPLASM OF UNSPECIFIED SITE OF RIGHT FEMALE BREAST: ICD-10-CM

## 2022-01-06 DIAGNOSIS — C50.919 MALIGNANT NEOPLASM OF UNSPECIFIED SITE OF UNSPECIFIED FEMALE BREAST: ICD-10-CM

## 2022-01-06 LAB
BLD GP AB SCN SERPL QL: NEGATIVE — SIGNIFICANT CHANGE UP
HCG SERPL-ACNC: <5 MIU/ML — SIGNIFICANT CHANGE UP
RH IG SCN BLD-IMP: POSITIVE — SIGNIFICANT CHANGE UP

## 2022-01-06 RX ORDER — SODIUM CHLORIDE 9 MG/ML
1000 INJECTION, SOLUTION INTRAVENOUS
Refills: 0 | Status: DISCONTINUED | OUTPATIENT
Start: 2022-01-21 | End: 2022-01-21

## 2022-01-06 RX ORDER — ALPRAZOLAM 0.25 MG
0 TABLET ORAL
Qty: 0 | Refills: 0 | DISCHARGE

## 2022-01-06 RX ORDER — CLONAZEPAM 1 MG
0 TABLET ORAL
Qty: 0 | Refills: 0 | DISCHARGE

## 2022-01-06 RX ORDER — OLANZAPINE 15 MG/1
1 TABLET, FILM COATED ORAL
Qty: 0 | Refills: 0 | DISCHARGE

## 2022-01-06 NOTE — H&P PST ADULT - HISTORY OF COVID-19 VACCINATION
[facility]- Pediatric Dermatology  Dr. Jordyn Shelton, Dr. Patric Morris, Dr. Adriana Lara, Dr. Daisy Adam & Dr. Raymond Ornelas       Non Urgent  Nurse Triage Line; [phone]- Quynh and Hannah RN Care Coordinators      [facility] Pediatric Dermatology Specialty - [phone]      If you need a prescription refill, please contact your pharmacy. Refills are approved or denied by our Physicians during normal business hours, Monday through Fridays    Per office policy, refills will not be granted if you have not been seen within the past year (or sooner depending on your child's condition)      Scheduling Information:     Pediatric Appointment Scheduling and Call Center [phone]   Radiology Scheduling- [phone]     Sedation Unit Scheduling- [phone]    [place] Scheduling- General [phone]; Pediatric Dermatology [phone]    Main  Services: [phone]   Sami: [phone]   American: [phone]   [place]mong/[place]/Latvian: [phone]      Preadmission Nursing Department Fax Number: [phone] (Fax all pre-operative paperwork to this number)      For urgent matters arising during evenings, weekends, or holidays that cannot wait for normal business hours please call [phone] and ask for the Dermatology Resident On-Call to be paged.       We'll check blood work today; if it is reassuring we will increase to 0.6 ml of methotrexate every Friday. You should continue the frequent moisturizer and as needed application of steroids.      
Yes

## 2022-01-06 NOTE — H&P PST ADULT - NEGATIVE CARDIOVASCULAR SYMPTOMS
\/no chest pain/no palpitations/no dyspnea on exertion/no orthopnea/no paroxysmal nocturnal dyspnea/no peripheral edema/no claudication no chest pain/no palpitations/no dyspnea on exertion/no orthopnea/no paroxysmal nocturnal dyspnea/no peripheral edema/no claudication

## 2022-01-06 NOTE — H&P PST ADULT - BREASTS COMMENTS
pt reports h/o right breast mass ; surgeon with original studies unable to palpate right breast mass

## 2022-01-06 NOTE — H&P PST ADULT - HISTORY OF PRESENT ILLNESS
50y/o female scheduled for right total mastectomy, right sentinel lymph node biopsy, possible right axillary node dissection, tissue expanders, reconstruction on 1/21/2022.  Pt states, "went for routine mammogram 7/2021, identified right breast abnormality, bx positive for malignancy.  Was previously scheduled for 12/2021 case was cancelled, pt  developed covid, pt went for covid testing- results positive, was symptomatic." 50y/o female scheduled for right total mastectomy, right sentinel lymph node biopsy, possible right axillary node dissection, tissue expanders, reconstruction on 1/21/2022.  Pt states, "went for routine mammogram 7/2021, identified right breast abnormality, bx positive for malignancy.  Was previously scheduled for 12/2021 case was cancelled, pt  developed covid, pt went for covid testing- results positive, was asymptomatic."

## 2022-01-06 NOTE — H&P PST ADULT - RS GEN PE MLT RESP DETAILS PC
airway patent/breath sounds equal/good air movement/respirations non-labored/clear to auscultation bilaterally/no chest wall tenderness/no intercostal retractions/no rales/no rhonchi/no wheezes breath sounds equal/good air movement/respirations non-labored/clear to auscultation bilaterally/no chest wall tenderness/no intercostal retractions/no rales/no rhonchi/no wheezes

## 2022-01-06 NOTE — H&P PST ADULT - PROBLEM SELECTOR PLAN 1
Pt scheduled for right total mastectomy right sentinel lymph node biopsy, possible right axillary node dissection, placement of tissue expander on 1/21/2022.  labs done results pending, ekg in chart.  Hibiclens provided-  written and verbal instructions given with teach back, pt able to verbalize understanding.  Urine cup provided.  Medical eval in chart.  Preop teaching done, pt able to verbalize understanding.    medications day of procedure-  klonopin, xanx, pepcid  ekg-  abnormal ekg pt cleared by pmd, case discussed with Dr. Lutz anesthesia pt does not need cardiology eval   pst request  medical eval - in chart.

## 2022-01-20 ENCOUNTER — TRANSCRIPTION ENCOUNTER (OUTPATIENT)
Age: 52
End: 2022-01-20

## 2022-01-20 NOTE — ASU PATIENT PROFILE, ADULT - FALL HARM RISK - UNIVERSAL INTERVENTIONS
Bed in lowest position, wheels locked, appropriate side rails in place/Call bell, personal items and telephone in reach/Instruct patient to call for assistance before getting out of bed or chair/Non-slip footwear when patient is out of bed/[place] to call system/Physically safe environment - no spills, clutter or unnecessary equipment/Purposeful Proactive Rounding/Room/bathroom lighting operational, light cord in reach

## 2022-01-21 ENCOUNTER — APPOINTMENT (OUTPATIENT)
Dept: NUCLEAR MEDICINE | Facility: HOSPITAL | Age: 52
End: 2022-01-21

## 2022-01-21 ENCOUNTER — RESULT REVIEW (OUTPATIENT)
Age: 52
End: 2022-01-21

## 2022-01-21 ENCOUNTER — NON-APPOINTMENT (OUTPATIENT)
Age: 52
End: 2022-01-21

## 2022-01-21 ENCOUNTER — OUTPATIENT (OUTPATIENT)
Dept: INPATIENT UNIT | Facility: HOSPITAL | Age: 52
LOS: 1 days | Discharge: HOME | End: 2022-01-21
Payer: COMMERCIAL

## 2022-01-21 ENCOUNTER — APPOINTMENT (OUTPATIENT)
Dept: SURGICAL ONCOLOGY | Facility: HOSPITAL | Age: 52
End: 2022-01-21

## 2022-01-21 VITALS
TEMPERATURE: 98 F | RESPIRATION RATE: 18 BRPM | HEART RATE: 75 BPM | SYSTOLIC BLOOD PRESSURE: 111 MMHG | HEIGHT: 63.5 IN | WEIGHT: 128.09 LBS | DIASTOLIC BLOOD PRESSURE: 56 MMHG

## 2022-01-21 DIAGNOSIS — Z98.890 OTHER SPECIFIED POSTPROCEDURAL STATES: Chronic | ICD-10-CM

## 2022-01-21 DIAGNOSIS — Z98.891 HISTORY OF UTERINE SCAR FROM PREVIOUS SURGERY: Chronic | ICD-10-CM

## 2022-01-21 DIAGNOSIS — C50.911 MALIGNANT NEOPLASM OF UNSPECIFIED SITE OF RIGHT FEMALE BREAST: ICD-10-CM

## 2022-01-21 LAB — HCG UR QL: NEGATIVE — SIGNIFICANT CHANGE UP

## 2022-01-21 PROCEDURE — 88307 TISSUE EXAM BY PATHOLOGIST: CPT | Mod: 26

## 2022-01-21 PROCEDURE — 88309 TISSUE EXAM BY PATHOLOGIST: CPT | Mod: 26

## 2022-01-21 DEVICE — CLIP APPLIER COVIDIEN SURGICLIP 11.5" MEDIUM: Type: IMPLANTABLE DEVICE | Site: RIGHT | Status: FUNCTIONAL

## 2022-01-21 DEVICE — LIGATING CLIPS WECK HORIZON MEDIUM (BLUE) 24: Type: IMPLANTABLE DEVICE | Site: RIGHT | Status: FUNCTIONAL

## 2022-01-21 DEVICE — XPND TISSUE BREAST SMOOTH MOD VAR PROJ 500CC: Type: IMPLANTABLE DEVICE | Site: RIGHT | Status: FUNCTIONAL

## 2022-01-21 RX ORDER — ONDANSETRON 8 MG/1
4 TABLET, FILM COATED ORAL ONCE
Refills: 0 | Status: DISCONTINUED | OUTPATIENT
Start: 2022-01-21 | End: 2022-01-21

## 2022-01-21 RX ORDER — OXYCODONE HYDROCHLORIDE 5 MG/1
5 TABLET ORAL EVERY 4 HOURS
Refills: 0 | Status: DISCONTINUED | OUTPATIENT
Start: 2022-01-21 | End: 2022-01-22

## 2022-01-21 RX ORDER — INFLUENZA VIRUS VACCINE 15; 15; 15; 15 UG/.5ML; UG/.5ML; UG/.5ML; UG/.5ML
0.5 SUSPENSION INTRAMUSCULAR ONCE
Refills: 0 | Status: COMPLETED | OUTPATIENT
Start: 2022-01-21 | End: 2022-01-21

## 2022-01-21 RX ORDER — OXYCODONE HYDROCHLORIDE 5 MG/1
2.5 TABLET ORAL EVERY 4 HOURS
Refills: 0 | Status: DISCONTINUED | OUTPATIENT
Start: 2022-01-21 | End: 2022-01-22

## 2022-01-21 RX ORDER — ALPRAZOLAM 0.25 MG
0.5 TABLET ORAL DAILY
Refills: 0 | Status: DISCONTINUED | OUTPATIENT
Start: 2022-01-21 | End: 2022-01-22

## 2022-01-21 RX ORDER — ACETAMINOPHEN 500 MG
650 TABLET ORAL EVERY 6 HOURS
Refills: 0 | Status: DISCONTINUED | OUTPATIENT
Start: 2022-01-21 | End: 2022-01-22

## 2022-01-21 RX ORDER — HYDROMORPHONE HYDROCHLORIDE 2 MG/ML
0.5 INJECTION INTRAMUSCULAR; INTRAVENOUS; SUBCUTANEOUS
Refills: 0 | Status: DISCONTINUED | OUTPATIENT
Start: 2022-01-21 | End: 2022-01-21

## 2022-01-21 RX ORDER — FENTANYL CITRATE 50 UG/ML
25 INJECTION INTRAVENOUS
Refills: 0 | Status: DISCONTINUED | OUTPATIENT
Start: 2022-01-21 | End: 2022-01-21

## 2022-01-21 RX ORDER — OLANZAPINE 15 MG/1
5 TABLET, FILM COATED ORAL AT BEDTIME
Refills: 0 | Status: DISCONTINUED | OUTPATIENT
Start: 2022-01-21 | End: 2022-01-22

## 2022-01-21 RX ORDER — CLONAZEPAM 1 MG
1 TABLET ORAL
Refills: 0 | Status: DISCONTINUED | OUTPATIENT
Start: 2022-01-21 | End: 2022-01-22

## 2022-01-21 RX ADMIN — Medication 650 MILLIGRAM(S): at 17:22

## 2022-01-21 RX ADMIN — FENTANYL CITRATE 25 MICROGRAM(S): 50 INJECTION INTRAVENOUS at 13:57

## 2022-01-21 RX ADMIN — FENTANYL CITRATE 25 MICROGRAM(S): 50 INJECTION INTRAVENOUS at 14:15

## 2022-01-21 RX ADMIN — OXYCODONE HYDROCHLORIDE 5 MILLIGRAM(S): 5 TABLET ORAL at 23:00

## 2022-01-21 RX ADMIN — OXYCODONE HYDROCHLORIDE 5 MILLIGRAM(S): 5 TABLET ORAL at 18:20

## 2022-01-21 RX ADMIN — FENTANYL CITRATE 25 MICROGRAM(S): 50 INJECTION INTRAVENOUS at 14:45

## 2022-01-21 RX ADMIN — SODIUM CHLORIDE 30 MILLILITER(S): 9 INJECTION, SOLUTION INTRAVENOUS at 17:22

## 2022-01-21 RX ADMIN — OLANZAPINE 5 MILLIGRAM(S): 15 TABLET, FILM COATED ORAL at 23:51

## 2022-01-21 RX ADMIN — Medication 1 MILLIGRAM(S): at 17:22

## 2022-01-21 RX ADMIN — OXYCODONE HYDROCHLORIDE 5 MILLIGRAM(S): 5 TABLET ORAL at 22:27

## 2022-01-21 RX ADMIN — OXYCODONE HYDROCHLORIDE 5 MILLIGRAM(S): 5 TABLET ORAL at 17:21

## 2022-01-21 RX ADMIN — FENTANYL CITRATE 25 MICROGRAM(S): 50 INJECTION INTRAVENOUS at 14:25

## 2022-01-21 NOTE — BRIEF OPERATIVE NOTE - OPERATION/FINDINGS
right TE, prepec, alloderm, 100 ml intraop filll
R mastectomy with SLNBx. Nodes positive on frozen section, therefore axillary dissection completed  Thoracodorsal nerve identified and preserved  B/l tissue expanders by PRS

## 2022-01-21 NOTE — PATIENT PROFILE ADULT - FALL HARM RISK - HARM RISK INTERVENTIONS

## 2022-01-21 NOTE — CHART NOTE - NSCHARTNOTEFT_GEN_A_CORE
POST-OPERATIVE NOTE    Subjective:  Patient is s/p mastectomy with axillary lymph node dissection. No nausea, vomiting, and eating well.     Vital Signs Last 24 Hrs  T(C): 36.7 (2022 02:09), Max: 37.2 (2022 13:27)  T(F): 98.1 (2022 02:09), Max: 99 (2022 13:27)  HR: 67 (2022 02:09) (67 - 79)  BP: 91/56 (2022 02:09) (90/54 - 131/64)  BP(mean): 74 (2022 16:00) (69 - 79)  RR: 18 (2022 02:09) (15 - 18)  SpO2: 100% (2022 02:09) (95% - 100%)  I&O's Detail    2022 07:01  -  2022 04:34  --------------------------------------------------------  IN:    Lactated Ringers: 90 mL    Oral Fluid: 60 mL  Total IN: 150 mL    OUT:    Bulb (mL): 15 mL    Bulb (mL): 140 mL    Voided (mL): 850 mL  Total OUT: 1005 mL    Total NET: -855 mL          PAST MEDICAL & SURGICAL HISTORY:  Asthma  last inhaler &gt; 5 years ago    Anxiety disorder    Breast cancer, right    History of  section  2012    S/P breast biopsy  right    Status post D&amp;C            Physical Exam:  General: NAD, resting comfortably in bed  Chest: bra in place; dressing c,d,i. Right drain with sanginous output.   Pulmonary: Nonlabored breathing, no respiratory distress  Cardiovascular: NSR  Abdominal: soft, NT/ND  Extremities: WWP      LABS:            CAPILLARY BLOOD GLUCOSE          Radiology and Additional Studies:    Assessment:  The patient is a 51y Female who is now several hours post-op from a right mastectomy with SLNB    Plan:  - Pain control as needed  - DVT ppx  - OOB and ambulating as tolerated  - No AM labs

## 2022-01-21 NOTE — BRIEF OPERATIVE NOTE - NSICDXBRIEFPROCEDURE_GEN_ALL_CORE_FT
PROCEDURES:  Insertion, tissue expander, breast 21-Jan-2022 17:32:07  Yusef López  
PROCEDURES:  Mastectomy with axillary node dissection 21-Jan-2022 16:19:56  Beulah Lowe

## 2022-01-22 ENCOUNTER — TRANSCRIPTION ENCOUNTER (OUTPATIENT)
Age: 52
End: 2022-01-22

## 2022-01-22 VITALS
OXYGEN SATURATION: 100 % | HEART RATE: 80 BPM | RESPIRATION RATE: 18 BRPM | SYSTOLIC BLOOD PRESSURE: 95 MMHG | TEMPERATURE: 98 F | DIASTOLIC BLOOD PRESSURE: 56 MMHG

## 2022-01-22 RX ORDER — OXYCODONE HYDROCHLORIDE 5 MG/1
1 TABLET ORAL
Qty: 8 | Refills: 0
Start: 2022-01-22

## 2022-01-22 RX ADMIN — OXYCODONE HYDROCHLORIDE 5 MILLIGRAM(S): 5 TABLET ORAL at 03:20

## 2022-01-22 RX ADMIN — Medication 650 MILLIGRAM(S): at 11:32

## 2022-01-22 RX ADMIN — Medication 650 MILLIGRAM(S): at 05:33

## 2022-01-22 RX ADMIN — OXYCODONE HYDROCHLORIDE 5 MILLIGRAM(S): 5 TABLET ORAL at 10:15

## 2022-01-22 RX ADMIN — OXYCODONE HYDROCHLORIDE 5 MILLIGRAM(S): 5 TABLET ORAL at 02:53

## 2022-01-22 RX ADMIN — Medication 650 MILLIGRAM(S): at 00:31

## 2022-01-22 RX ADMIN — OXYCODONE HYDROCHLORIDE 5 MILLIGRAM(S): 5 TABLET ORAL at 09:45

## 2022-01-22 RX ADMIN — Medication 1 MILLIGRAM(S): at 05:34

## 2022-01-22 NOTE — PROGRESS NOTE ADULT - ASSESSMENT
Stable POD 1  Healing well  Continue drains  Leave bra on and dry  Discharge home  Drain care at home  Follow-up in office Tuesday  Discussed with RN

## 2022-01-22 NOTE — PROGRESS NOTE ADULT - SUBJECTIVE AND OBJECTIVE BOX
NAD  Afebrile, VSS  Right breast closure intact. Skin viable.  TE in good position.  Drains serosang.  
Subjective:   Patient seen and examined at bedside. No acute events overnight.     Objective:   Vital Signs Last 24 Hrs  T(C): 36.7 (22 Jan 2022 02:09), Max: 37.2 (21 Jan 2022 13:27)  T(F): 98.1 (22 Jan 2022 02:09), Max: 99 (21 Jan 2022 13:27)  HR: 67 (22 Jan 2022 02:09) (67 - 79)  BP: 91/56 (22 Jan 2022 02:09) (90/54 - 131/64)  BP(mean): 74 (21 Jan 2022 16:00) (69 - 79)  RR: 18 (22 Jan 2022 02:09) (15 - 18)  SpO2: 100% (22 Jan 2022 02:09) (95% - 100%)  I&O's Summary    21 Jan 2022 07:01  -  22 Jan 2022 04:40  --------------------------------------------------------  IN: 150 mL / OUT: 1005 mL / NET: -855 mL        Physical Exam:  General: NAD, resting comfortably in bed  Chest: bra in place; dressing c,d,i. Right drain with sanginous output.   Pulmonary: Nonlabored breathing, no respiratory distress  Cardiovascular: NSR  Abdominal: soft, NT/ND  Extremities: WWP      LABS:                  Radiology and Additional Studies:    Assessment and Plan:

## 2022-01-22 NOTE — DISCHARGE NOTE NURSING/CASE MANAGEMENT/SOCIAL WORK - PATIENT PORTAL LINK FT
You can access the FollowMyHealth Patient Portal offered by [facility] by registering at the following website: [email or web address]. By joining [facility]’s FollowMyHealth portal, you will also be able to view your health information using other applications (apps) compatible with our system.

## 2022-01-22 NOTE — DISCHARGE NOTE PROVIDER - HOSPITAL COURSE
Pt admitted for R mastectomy with SLNBx, completed to axillary dissection based on positive frozen pathology on 1/22, with b/l tissue expanders placed by plastics. Pt tolerated the procedure well without complications. Post op she was transferred to the floor and recovered well.    On day of discharge, the patient was tolerating diet, having bowel function, ambulating well and pain controlled.

## 2022-01-22 NOTE — PROGRESS NOTE ADULT - ASSESSMENT
The patient is a 51y Female who is now several hours post-op from a right mastectomy with SLNB    Plan:  - Pain control as needed  - DVT ppx  - OOB and ambulating as tolerated  - No AM labs.

## 2022-01-22 NOTE — DISCHARGE NOTE PROVIDER - NSDCMRMEDTOKEN_GEN_ALL_CORE_FT
KlonoPIN 1 mg oral tablet: 1 tab(s) orally 2 times a day  oxyCODONE 5 mg oral tablet: 1 tab(s) orally every 4 hours, As Needed -for severe pain MDD:4 tabs   Xanax 0.5 mg oral tablet: 1 tab(s) orally once a day, As Needed  ZyPREXA 5 mg oral tablet: 1 tab(s) orally once a day (at bedtime)

## 2022-01-22 NOTE — DISCHARGE NOTE PROVIDER - CARE PROVIDER_API CALL
William Cohen)  Surgery  [address] [place] [id number]  Phone: [phone]  Fax: [phone]  Follow Up Time:

## 2022-01-22 NOTE — DISCHARGE NOTE NURSING/CASE MANAGEMENT/SOCIAL WORK - NSDCPEFALRISK_GEN_ALL_CORE
For information on Fall & Injury Prevention, visit: [email or web address] OR  [email or web address] OR  https://www.cdc.gov/steadi/patient.html

## 2022-01-22 NOTE — DISCHARGE NOTE PROVIDER - NSDCCPTREATMENT_GEN_ALL_CORE_FT
PRINCIPAL PROCEDURE  Procedure: Mastectomy with axillary node dissection  Findings and Treatment:

## 2022-01-22 NOTE — DISCHARGE NOTE PROVIDER - NSDCFUADDINST_GEN_ALL_CORE_FT
WOUND CARE: Do not remove the Steri strips, they will fall off on their own or be removed in the office. Follow instructions you received in the hospital regarding drain care.  BATHING: Please do not submerge wound underwater. You may shower and/or sponge bathe.  ACTIVITY: No heavy lifting anything more than 10-15lbs or straining. Otherwise, you may return to your usual level of physical activity. If you are taking narcotic pain medication (such as Percocet), do NOT drive a car, operate machinery or make important decisions.  PAIN CONTROL: For pain take Tylenol (extra strength if needed) every 6 hours. You may take Oxycodone if you have severe pain. Please call the office if your pain is not controlled or if the pain is getting worse.  DIET: Regular  MEDICATIONS: You may resume your regular medications.   NOTIFY YOUR SURGEON IF: You have any bleeding that does not stop, any pus draining from your wound, any fever (over 100.4 F) or chills, persistent nausea/vomiting with inability to tolerate food or liquids, persistent diarrhea, or if your pain is not controlled on your discharge pain medications.  FOLLOW-UP:  1. Please call to make a follow-up appointment after discharge  2. Please follow up with your primary care physician in one week regarding your hospitalization.

## 2022-01-27 LAB — SURGICAL PATHOLOGY STUDY: SIGNIFICANT CHANGE UP

## 2022-02-02 ENCOUNTER — APPOINTMENT (OUTPATIENT)
Dept: SURGICAL ONCOLOGY | Facility: CLINIC | Age: 52
End: 2022-02-02
Payer: COMMERCIAL

## 2022-02-02 VITALS
SYSTOLIC BLOOD PRESSURE: 114 MMHG | TEMPERATURE: 97.5 F | DIASTOLIC BLOOD PRESSURE: 77 MMHG | HEART RATE: 90 BPM | RESPIRATION RATE: 16 BRPM | HEIGHT: 62 IN | WEIGHT: 124 LBS | BODY MASS INDEX: 22.82 KG/M2 | OXYGEN SATURATION: 98 %

## 2022-02-02 PROCEDURE — 99024 POSTOP FOLLOW-UP VISIT: CPT

## 2022-02-02 NOTE — PHYSICAL EXAM
[Normal] : well developed, well nourished, in no acute distress [FreeTextEntry1] :  SIA present during exam  [de-identified] : s/p right mastectomy with tissue expander reconstruction; 1 darlene ; no evidence of infection  [de-identified] : Clear breath sounds bilaterally, normal respiratory effort.

## 2022-02-02 NOTE — HISTORY OF PRESENT ILLNESS
[de-identified] : 52 y/o female presents for an initial postop visit, s/p right total mastectomy, right axillary SLNB, right axillary lymph node dissection on 01/21/2022. Final pathology on 01/21/2022 revealed a 2.1 cm invasive ductal carcinoma with apocrine features, (-) margins,  15/18 lymph nodes positive for metastatic carcinoma. Tumor stage: pT2, pN3a, ER/UT/HER2 (-).   Tissue expander reconstruction performed by Dr. Ingram.  Reports discomfort to the right chest wall, relieved with tylenol/motrin.  Has 1 SIA in place and is scheduled to f/u with Dr. Ingram tomorrow.  No fever or chills. \par \par PERTINENT HISTORY- \par 52 y/o female presents for an initial consultation , referred by Dr. Royce Goldman.\par \par MRI performed on 11/10/2021 revealed 2.2 cm thick linear nonmass enhancement more inferiorly in the right later 9:00 retroareolar location which extends to within 1.1 cm of the nipple. No evidence of malignancy in the contralateral left breast, no lymphadenopathy. BIRADS- 4A. Recommendation- MR guided biopsy. \par \par 2 US guided core biopsies were performed on 10/19/21 which revealed:\par 1) Right breast 12:00 3 cmFN: invasive mammary carcinoma, moderately differentiated, spanning at least 9 mm in this material. Primary carcinoma in situ with apocrine features. \par 2) Right breast 10:00 2 cmFN: Invasive mammary carcinoma, moderately differentiated, spanning at least 7 mm in this material. 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 Lymphovascular invasion is identified.  \par ***ER/UT/HER2 status pending***\par \par On 09/17/21 she underwent a Diagnostic mammo and US which revealed persistent spiculated like mass in the right breast at about 12:00 corresponding to a 1.5 cm irregular hypoechoic nodule with posterior shadowing on ultrasound.  There is a 1.3 cm hypoechoic nodule questionable irregularity 10:00 right breast 2 cmFN. Multiple other small nodules bilaterally suggesting complicated and simple cyst. Bilateral mild ductal ectasia retroareolar region. Biopsy recommended. BIRADS 5.  Short term follow up bilateral ultrasound recommended to assess stability of additional nodules.\par \par A routine mammo performed on 7/17/21 revealed a spiculated mass in the right breast at the 12:00, 5 cmFN. Spot compressions views and target sonography recommended to determine if this mass is visualized sonographically unremarkable amenable to a sonographic guided core biopsy. If this is not identified sonographically then stereotactic biopsy recommended for definitive histologic diagnosis. BIRADS 0. \par \par She has a prior history of 2 benign core biopsies of the breast.  Her past medical history includes anxiety and asthma.  She has a family history of breast cancer in a paternal first cousin in her 30's and maternal first cousin in her 50's.  She currently takes OTC estroven for vasomotor symptoms.  She states that she had not had a menstrual period for over a year, but got her period after the recent biopsy.\par \par She denies any palpable breast masses, nipple discharge, inversion or skin change.

## 2022-02-03 ENCOUNTER — OUTPATIENT (OUTPATIENT)
Dept: OUTPATIENT SERVICES | Facility: HOSPITAL | Age: 52
LOS: 1 days | Discharge: HOME | End: 2022-02-03

## 2022-02-03 DIAGNOSIS — Z98.890 OTHER SPECIFIED POSTPROCEDURAL STATES: Chronic | ICD-10-CM

## 2022-02-03 DIAGNOSIS — Z98.891 HISTORY OF UTERINE SCAR FROM PREVIOUS SURGERY: Chronic | ICD-10-CM

## 2022-02-03 DIAGNOSIS — C50.919 MALIGNANT NEOPLASM OF UNSPECIFIED SITE OF UNSPECIFIED FEMALE BREAST: ICD-10-CM

## 2022-02-04 ENCOUNTER — RESULT REVIEW (OUTPATIENT)
Age: 52
End: 2022-02-04

## 2022-02-04 ENCOUNTER — NON-APPOINTMENT (OUTPATIENT)
Age: 52
End: 2022-02-04

## 2022-02-04 ENCOUNTER — APPOINTMENT (OUTPATIENT)
Dept: HEMATOLOGY ONCOLOGY | Facility: CLINIC | Age: 52
End: 2022-02-04
Payer: COMMERCIAL

## 2022-02-04 VITALS
OXYGEN SATURATION: 99 % | DIASTOLIC BLOOD PRESSURE: 75 MMHG | SYSTOLIC BLOOD PRESSURE: 111 MMHG | WEIGHT: 130.29 LBS | HEIGHT: 62 IN | HEART RATE: 98 BPM | TEMPERATURE: 98.1 F | RESPIRATION RATE: 16 BRPM | BODY MASS INDEX: 23.98 KG/M2

## 2022-02-04 DIAGNOSIS — Z98.890 OTHER SPECIFIED POSTPROCEDURAL STATES: ICD-10-CM

## 2022-02-04 DIAGNOSIS — N84.0 POLYP OF CORPUS UTERI: ICD-10-CM

## 2022-02-04 DIAGNOSIS — Z85.3 PERSONAL HISTORY OF MALIGNANT NEOPLASM OF BREAST: ICD-10-CM

## 2022-02-04 DIAGNOSIS — Z80.1 FAMILY HISTORY OF MALIGNANT NEOPLASM OF TRACHEA, BRONCHUS AND LUNG: ICD-10-CM

## 2022-02-04 DIAGNOSIS — Z01.818 ENCOUNTER FOR OTHER PREPROCEDURAL EXAMINATION: ICD-10-CM

## 2022-02-04 LAB
BASOPHILS # BLD AUTO: 0.05 K/UL — SIGNIFICANT CHANGE UP (ref 0–0.2)
BASOPHILS NFR BLD AUTO: 0.6 % — SIGNIFICANT CHANGE UP (ref 0–2)
EOSINOPHIL # BLD AUTO: 0.37 K/UL — SIGNIFICANT CHANGE UP (ref 0–0.5)
EOSINOPHIL NFR BLD AUTO: 4.2 % — SIGNIFICANT CHANGE UP (ref 0–6)
HCT VFR BLD CALC: 38.1 % — SIGNIFICANT CHANGE UP (ref 34.5–45)
HGB BLD-MCNC: 12.1 G/DL — SIGNIFICANT CHANGE UP (ref 11.5–15.5)
IMM GRANULOCYTES NFR BLD AUTO: 0.3 % — SIGNIFICANT CHANGE UP (ref 0–1.5)
LYMPHOCYTES # BLD AUTO: 1.7 K/UL — SIGNIFICANT CHANGE UP (ref 1–3.3)
LYMPHOCYTES # BLD AUTO: 19.2 % — SIGNIFICANT CHANGE UP (ref 13–44)
MCHC RBC-ENTMCNC: 31 PG — SIGNIFICANT CHANGE UP (ref 27–34)
MCHC RBC-ENTMCNC: 31.8 G/DL — LOW (ref 32–36)
MCV RBC AUTO: 97.7 FL — SIGNIFICANT CHANGE UP (ref 80–100)
MONOCYTES # BLD AUTO: 0.56 K/UL — SIGNIFICANT CHANGE UP (ref 0–0.9)
MONOCYTES NFR BLD AUTO: 6.3 % — SIGNIFICANT CHANGE UP (ref 2–14)
NEUTROPHILS # BLD AUTO: 6.15 K/UL — SIGNIFICANT CHANGE UP (ref 1.8–7.4)
NEUTROPHILS NFR BLD AUTO: 69.4 % — SIGNIFICANT CHANGE UP (ref 43–77)
NRBC # BLD: 0 /100 WBCS — SIGNIFICANT CHANGE UP (ref 0–0)
PLATELET # BLD AUTO: 406 K/UL — HIGH (ref 150–400)
RBC # BLD: 3.9 M/UL — SIGNIFICANT CHANGE UP (ref 3.8–5.2)
RBC # FLD: 11.9 % — SIGNIFICANT CHANGE UP (ref 10.3–14.5)
WBC # BLD: 8.86 K/UL — SIGNIFICANT CHANGE UP (ref 3.8–10.5)
WBC # FLD AUTO: 8.86 K/UL — SIGNIFICANT CHANGE UP (ref 3.8–10.5)

## 2022-02-04 PROCEDURE — 99205 OFFICE O/P NEW HI 60 MIN: CPT

## 2022-02-04 RX ORDER — BUPROPION HYDROCHLORIDE 100 MG/1
TABLET, FILM COATED ORAL
Refills: 0 | Status: DISCONTINUED | COMMUNITY
End: 2022-02-04

## 2022-02-14 LAB
ALBUMIN SERPL ELPH-MCNC: 5 G/DL
ALP BLD-CCNC: 121 U/L
ALT SERPL-CCNC: 13 U/L
ANION GAP SERPL CALC-SCNC: 14 MMOL/L
AST SERPL-CCNC: 14 U/L
BILIRUB SERPL-MCNC: 0.2 MG/DL
BUN SERPL-MCNC: 14 MG/DL
CALCIUM SERPL-MCNC: 9.6 MG/DL
CANCER AG27-29 SERPL-ACNC: 9.9 U/ML
CEA SERPL-MCNC: 1.7 NG/ML
CHLORIDE SERPL-SCNC: 107 MMOL/L
CO2 SERPL-SCNC: 23 MMOL/L
CREAT SERPL-MCNC: 0.72 MG/DL
ESTIMATED AVERAGE GLUCOSE: 91 MG/DL
GLUCOSE SERPL-MCNC: 91 MG/DL
HBA1C MFR BLD HPLC: 4.8 %
HBV CORE IGG+IGM SER QL: NONREACTIVE
HBV SURFACE AB SER QL: NONREACTIVE
HBV SURFACE AG SER QL: NONREACTIVE
HCV AB SER QL: NONREACTIVE
HCV S/CO RATIO: 0.13 S/CO
INR PPP: 1.03 RATIO
POTASSIUM SERPL-SCNC: 4.3 MMOL/L
PROT SERPL-MCNC: 7.5 G/DL
PT BLD: 12.1 SEC
SODIUM SERPL-SCNC: 143 MMOL/L
TSH SERPL-ACNC: 0.77 UIU/ML

## 2022-02-14 NOTE — CONSULT LETTER
[Dear  ___] : Dear  [unfilled], [( Thank you for referring [unfilled] for consultation for _____ )] : Thank you for referring [unfilled] for consultation for [unfilled] [Please see my note below.] : Please see my note below. [Consult Closing:] : Thank you very much for allowing me to participate in the care of this patient.  If you have any questions, please do not hesitate to contact me. [Sincerely,] : Sincerely, [DrDarryn  ___] : Dr. DIAMOND [DrDarryn ___] : Dr. DIAMOND [FreeTextEntry2] : William Cohen MD\par [address]\[place] [address]\par  [FreeTextEntry3] : Teresa Nunez MD\par Associate Chief \par [facility]\par [facility]\par  of Medicine\par [employer]\par \par

## 2022-02-14 NOTE — HISTORY OF PRESENT ILLNESS
[Disease: _____________________] : Disease: [unfilled] [T: ___] : T[unfilled] [N: ___] : N[unfilled] [M: ___] : M[unfilled] [AJCC Stage: ____] : AJCC Stage: [unfilled] [de-identified] : Right triple negative breast cancer diagnosed at the age of 51.\par 07/17/21 Mammogram and breast US showed a spiculated mass in the right breast at the 12 o'clock axis 5 cm FN. \par 09/17/21 Unilateral Mammogram and breast US showed a persistent spiculated mass in the right breast about 12 o'clock axis corresponding with a 1.5 x 1.0 x 0.8 irregular hypoechoic nodule with posterior shadowing. Also noted was a 1.3 x 0.6 x 1.2 cm hypoechoic nodule questionable irregularity 10 o'clock axis right breast 2 cm FN. Multiple other small nodules bilaterally suggesting complicated and simple cysts. Bilateral mild duct ectasia retroareolar region. BI-RADS 5.\par 10/19/21 Right breast 12 o'clock axis 3 cm FN core biopsy: ~0.9 cm invasive mammary carcinoma with apocrine features, moderately differentiated ER<1%, NM 0% and HER-2 (-)\par   Right breast 10 o'clock axis 2 cm FN core biopsy: ~0.7 cm Invasive mammary carcinoma, moderately differentiated. 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   Lymphovascular invasion is identified. The invasive and in situ mammary carcinoma shows a ductal phenotype but diminished/loss of membranous staining. \par 11/11/21 [facility] Breast cancer stat panel of 7 genes negative for pathogenic variants.\par 11/15/21 MRI Breast showed right upper central/slightly inner dominant enhancing mass measuring up to 3.3 cm and containing a biopsy clip, corresponding to one site of biopsy-proven malignancy. There is a 1.5 cm smaller irregular enhancing mass just inferior and lateral to the dominant aforementioned mass, corresponding to a second site of biopsy-proven malignancy. Small enhancing foci and non mass enhancement surrounding both masses, concerning for small satellite lesions and additional disease. There is 2.2 cm thick linear non mass enhancement more inferiorly in the right lateral 9:00 retroareolar location which extends to within 1.1 cm of the nipple. No MRI evidence of malignancy in the contralateral left breast. No lymphadenopathy. A 1.2 cm nonspecific T2 bright right hepatic lobe lesion which is incompletely visualized. Suggest dedicated liver MRI.\par 11/15/21 CT scan of C/A/P showed no imaging evidence of metastatic disease involving the chest, abdomen or pelvis. Two small cysts were noted in the right lobe of the liver but no suspicious lesions. Diffuse bladder wall thickening may indicate cystitis. \par 01/21/22 Right mastectomy and ALND by Dr. Cohen revealed a 2.1 cm IDC, SBR 6/9. Also noted was a 1.7 cm focus of DCIS, cribriform and solid types, intermediate nuclear grade with focal necrosis. Extensive lymphovascular invasion. 1/1 SLN positive with a 1.7 cm focus of disease. Right axillary lymph node dissection revealed 15/18 positive LN.\par She had immediate reconstruction with expander placement by Dr. Blane Ingram. [de-identified] : 2.1 cm IDC, SBR 6/9 with 16/19 LN positive, no extranodal extension, triple negative [de-identified] : Idalmis comes to discuss the medical management of her newly diagnosed triple negative breast cancer. \par She has had anxiety for many years and is on olanzapine for this with Psychiatrist Dr Navin Canales.\par She is  with one son age 9. She works for [employer] in [employer] [place], working remotely.\par \par HEALTH MAINTENANCE:\par PCP: Addis Peres MD\par GYN: Royce Goldman MD\par Mammogram: 21\par Pap smear: 21\par Endometrial polypectomy: 21 Endometrial curetting showed endometrial epithelium with proliferative features. Fragments of endometrial poly and proliferative endometrium\par Colonoscopy: none\par Genetic testin21 [facility] Breast cancer stat panel of 7 genes negative for pathogenic variants\par

## 2022-02-14 NOTE — PHYSICAL EXAM
[Fully active, able to carry on all pre-disease performance without restriction] : Status 0 - Fully active, able to carry on all pre-disease performance without restriction [Normal] : affect appropriate [de-identified] : s/p right mastectomy with well healing incisino

## 2022-02-15 ENCOUNTER — APPOINTMENT (OUTPATIENT)
Dept: CARDIOLOGY | Facility: CLINIC | Age: 52
End: 2022-02-15
Payer: COMMERCIAL

## 2022-02-15 PROCEDURE — 93356 MYOCRD STRAIN IMG SPCKL TRCK: CPT

## 2022-02-15 PROCEDURE — 93306 TTE W/DOPPLER COMPLETE: CPT

## 2022-03-02 ENCOUNTER — RESULT REVIEW (OUTPATIENT)
Age: 52
End: 2022-03-02

## 2022-03-02 ENCOUNTER — APPOINTMENT (OUTPATIENT)
Dept: INFUSION THERAPY | Facility: HOSPITAL | Age: 52
End: 2022-03-02

## 2022-03-02 ENCOUNTER — NON-APPOINTMENT (OUTPATIENT)
Age: 52
End: 2022-03-02

## 2022-03-02 LAB
BASOPHILS # BLD AUTO: 0.07 K/UL — SIGNIFICANT CHANGE UP (ref 0–0.2)
BASOPHILS NFR BLD AUTO: 0.9 % — SIGNIFICANT CHANGE UP (ref 0–2)
EOSINOPHIL # BLD AUTO: 0.15 K/UL — SIGNIFICANT CHANGE UP (ref 0–0.5)
EOSINOPHIL NFR BLD AUTO: 1.9 % — SIGNIFICANT CHANGE UP (ref 0–6)
HCT VFR BLD CALC: 37.9 % — SIGNIFICANT CHANGE UP (ref 34.5–45)
HGB BLD-MCNC: 12.3 G/DL — SIGNIFICANT CHANGE UP (ref 11.5–15.5)
IMM GRANULOCYTES NFR BLD AUTO: 1.1 % — SIGNIFICANT CHANGE UP (ref 0–1.5)
LYMPHOCYTES # BLD AUTO: 1.29 K/UL — SIGNIFICANT CHANGE UP (ref 1–3.3)
LYMPHOCYTES # BLD AUTO: 16.1 % — SIGNIFICANT CHANGE UP (ref 13–44)
MCHC RBC-ENTMCNC: 30.6 PG — SIGNIFICANT CHANGE UP (ref 27–34)
MCHC RBC-ENTMCNC: 32.5 G/DL — SIGNIFICANT CHANGE UP (ref 32–36)
MCV RBC AUTO: 94.3 FL — SIGNIFICANT CHANGE UP (ref 80–100)
MONOCYTES # BLD AUTO: 0.61 K/UL — SIGNIFICANT CHANGE UP (ref 0–0.9)
MONOCYTES NFR BLD AUTO: 7.6 % — SIGNIFICANT CHANGE UP (ref 2–14)
NEUTROPHILS # BLD AUTO: 5.78 K/UL — SIGNIFICANT CHANGE UP (ref 1.8–7.4)
NEUTROPHILS NFR BLD AUTO: 72.4 % — SIGNIFICANT CHANGE UP (ref 43–77)
NRBC # BLD: 0 /100 WBCS — SIGNIFICANT CHANGE UP (ref 0–0)
PLATELET # BLD AUTO: 305 K/UL — SIGNIFICANT CHANGE UP (ref 150–400)
RBC # BLD: 4.02 M/UL — SIGNIFICANT CHANGE UP (ref 3.8–5.2)
RBC # FLD: 12 % — SIGNIFICANT CHANGE UP (ref 10.3–14.5)
WBC # BLD: 7.99 K/UL — SIGNIFICANT CHANGE UP (ref 3.8–10.5)
WBC # FLD AUTO: 7.99 K/UL — SIGNIFICANT CHANGE UP (ref 3.8–10.5)

## 2022-03-02 RX ORDER — OLANZAPINE 5 MG/1
5 TABLET, FILM COATED ORAL
Refills: 0 | Status: DISCONTINUED | COMMUNITY
End: 2022-03-02

## 2022-03-03 ENCOUNTER — NON-APPOINTMENT (OUTPATIENT)
Age: 52
End: 2022-03-03

## 2022-03-03 DIAGNOSIS — Z51.89 ENCOUNTER FOR OTHER SPECIFIED AFTERCARE: ICD-10-CM

## 2022-03-03 DIAGNOSIS — R11.2 NAUSEA WITH VOMITING, UNSPECIFIED: ICD-10-CM

## 2022-03-15 ENCOUNTER — OUTPATIENT (OUTPATIENT)
Dept: OUTPATIENT SERVICES | Facility: HOSPITAL | Age: 52
LOS: 1 days | Discharge: HOME | End: 2022-03-15
Payer: COMMERCIAL

## 2022-03-15 DIAGNOSIS — Z98.890 OTHER SPECIFIED POSTPROCEDURAL STATES: Chronic | ICD-10-CM

## 2022-03-15 DIAGNOSIS — Z98.891 HISTORY OF UTERINE SCAR FROM PREVIOUS SURGERY: Chronic | ICD-10-CM

## 2022-03-15 DIAGNOSIS — Z90.11 ACQUIRED ABSENCE OF RIGHT BREAST AND NIPPLE: Chronic | ICD-10-CM

## 2022-03-15 DIAGNOSIS — C50.919 MALIGNANT NEOPLASM OF UNSPECIFIED SITE OF UNSPECIFIED FEMALE BREAST: ICD-10-CM

## 2022-03-17 ENCOUNTER — RESULT REVIEW (OUTPATIENT)
Age: 52
End: 2022-03-17

## 2022-03-17 ENCOUNTER — APPOINTMENT (OUTPATIENT)
Dept: HEMATOLOGY ONCOLOGY | Facility: CLINIC | Age: 52
End: 2022-03-17
Payer: COMMERCIAL

## 2022-03-17 ENCOUNTER — APPOINTMENT (OUTPATIENT)
Dept: INFUSION THERAPY | Facility: HOSPITAL | Age: 52
End: 2022-03-17

## 2022-03-17 ENCOUNTER — NON-APPOINTMENT (OUTPATIENT)
Age: 52
End: 2022-03-17

## 2022-03-17 VITALS
WEIGHT: 127.65 LBS | HEART RATE: 80 BPM | RESPIRATION RATE: 16 BRPM | OXYGEN SATURATION: 99 % | TEMPERATURE: 98.2 F | SYSTOLIC BLOOD PRESSURE: 111 MMHG | DIASTOLIC BLOOD PRESSURE: 75 MMHG | BODY MASS INDEX: 23.35 KG/M2

## 2022-03-17 DIAGNOSIS — Z51.11 ENCOUNTER FOR ANTINEOPLASTIC CHEMOTHERAPY: ICD-10-CM

## 2022-03-17 DIAGNOSIS — R11.2 NAUSEA WITH VOMITING, UNSPECIFIED: ICD-10-CM

## 2022-03-17 DIAGNOSIS — Z51.89 ENCOUNTER FOR OTHER SPECIFIED AFTERCARE: ICD-10-CM

## 2022-03-17 LAB
BASOPHILS # BLD AUTO: 0.08 K/UL — SIGNIFICANT CHANGE UP (ref 0–0.2)
BASOPHILS NFR BLD AUTO: 0.8 % — SIGNIFICANT CHANGE UP (ref 0–2)
EOSINOPHIL # BLD AUTO: 0.02 K/UL — SIGNIFICANT CHANGE UP (ref 0–0.5)
EOSINOPHIL NFR BLD AUTO: 0.2 % — SIGNIFICANT CHANGE UP (ref 0–6)
HCT VFR BLD CALC: 35.8 % — SIGNIFICANT CHANGE UP (ref 34.5–45)
HGB BLD-MCNC: 11.8 G/DL — SIGNIFICANT CHANGE UP (ref 11.5–15.5)
IMM GRANULOCYTES NFR BLD AUTO: 5.6 % — HIGH (ref 0–1.5)
LYMPHOCYTES # BLD AUTO: 0.98 K/UL — LOW (ref 1–3.3)
LYMPHOCYTES # BLD AUTO: 9.8 % — LOW (ref 13–44)
MCHC RBC-ENTMCNC: 31.1 PG — SIGNIFICANT CHANGE UP (ref 27–34)
MCHC RBC-ENTMCNC: 33 G/DL — SIGNIFICANT CHANGE UP (ref 32–36)
MCV RBC AUTO: 94.2 FL — SIGNIFICANT CHANGE UP (ref 80–100)
MONOCYTES # BLD AUTO: 0.55 K/UL — SIGNIFICANT CHANGE UP (ref 0–0.9)
MONOCYTES NFR BLD AUTO: 5.5 % — SIGNIFICANT CHANGE UP (ref 2–14)
NEUTROPHILS # BLD AUTO: 7.79 K/UL — HIGH (ref 1.8–7.4)
NEUTROPHILS NFR BLD AUTO: 78.1 % — HIGH (ref 43–77)
NRBC # BLD: 0 /100 WBCS — SIGNIFICANT CHANGE UP (ref 0–0)
PLATELET # BLD AUTO: 236 K/UL — SIGNIFICANT CHANGE UP (ref 150–400)
RBC # BLD: 3.8 M/UL — SIGNIFICANT CHANGE UP (ref 3.8–5.2)
RBC # FLD: 12.1 % — SIGNIFICANT CHANGE UP (ref 10.3–14.5)
WBC # BLD: 9.98 K/UL — SIGNIFICANT CHANGE UP (ref 3.8–10.5)
WBC # FLD AUTO: 9.98 K/UL — SIGNIFICANT CHANGE UP (ref 3.8–10.5)

## 2022-03-17 PROCEDURE — 99214 OFFICE O/P EST MOD 30 MIN: CPT

## 2022-03-17 NOTE — HISTORY OF PRESENT ILLNESS
[Disease: _____________________] : Disease: [unfilled] [T: ___] : T[unfilled] [N: ___] : N[unfilled] [M: ___] : M[unfilled] [AJCC Stage: ____] : AJCC Stage: [unfilled] [Date: ____________] : Patient's last distress assessment performed on [unfilled]. [10 - Extreme Distress] : Distress Level: 10 [Referred Patient  to social work for follow-up] : Patient was referred to social work for follow-up [de-identified] : Right triple negative breast cancer diagnosed at the age of 51.\par 07/17/21 Mammogram and breast US showed a spiculated mass in the right breast at the 12 o'clock axis 5 cm FN. \par 09/17/21 Unilateral Mammogram and breast US showed a persistent spiculated mass in the right breast about 12 o'clock axis corresponding with a 1.5 x 1.0 x 0.8 irregular hypoechoic nodule with posterior shadowing. Also noted was a 1.3 x 0.6 x 1.2 cm hypoechoic nodule questionable irregularity 10 o'clock axis right breast 2 cm FN. Multiple other small nodules bilaterally suggesting complicated and simple cysts. Bilateral mild duct ectasia retroareolar region. BI-RADS 5.\par 10/19/21 Right breast 12 o'clock axis 3 cm FN core biopsy: ~0.9 cm invasive mammary carcinoma with apocrine features, moderately differentiated ER<1%, [place] 0% and HER-2 (-)\par   Right breast 10 o'clock axis 2 cm FN core biopsy: ~0.7 cm Invasive mammary carcinoma, moderately differentiated. 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   Lymphovascular invasion is identified. The invasive and in situ mammary carcinoma shows a ductal phenotype but diminished/loss of membranous staining. \par 11/11/21 [facility] Breast cancer stat panel of 7 genes negative for pathogenic variants.\par 11/15/21 MRI Breast showed right upper central/slightly inner dominant enhancing mass measuring up to 3.3 cm and containing a biopsy clip, corresponding to one site of biopsy-proven malignancy. There is a 1.5 cm smaller irregular enhancing mass just inferior and lateral to the dominant aforementioned mass, corresponding to a second site of biopsy-proven malignancy. Small enhancing foci and non mass enhancement surrounding both masses, concerning for small satellite lesions and additional disease. There is 2.2 cm thick linear non mass enhancement more inferiorly in the right lateral 9:00 retroareolar location which extends to within 1.1 cm of the nipple. No MRI evidence of malignancy in the contralateral left breast. No lymphadenopathy. A 1.2 cm nonspecific T2 bright right hepatic lobe lesion which is incompletely visualized. Suggest dedicated liver MRI.\par 11/15/21 CT scan of C/A/P showed no imaging evidence of metastatic disease involving the chest, abdomen or pelvis. Two small cysts were noted in the right lobe of the liver but no suspicious lesions. Diffuse bladder wall thickening may indicate cystitis. \par 01/21/22 Right mastectomy and ALND by Dr. Cohen revealed a 2.1 cm IDC, SBR 6/9. Also noted was a 1.7 cm focus of DCIS, cribriform and solid types, intermediate nuclear grade with focal necrosis. Extensive lymphovascular invasion. 1/1 SLN positive with a 1.7 cm focus of disease. Right axillary lymph node dissection revealed 15/18 positive LN.\par She had immediate reconstruction with expander placement by Dr. Blane Ingram.\par 3/3/22 Adjuvant chemotherapy with Adriamycin (60 mg/m2) & Cytoxan (600 mg/m2) with Neulasta x 4 cycles to be followed by Taxol (175 mg/m2) x 4 cycles, all every 2 weeks\par  [de-identified] : 2.1 cm IDC, SBR 6/9 with 16/19 LN positive, no extranodal extension, triple negative [de-identified] : Idalmis comes today for cycle #2 AC. \par She tolerated the first cycle of AC well aside from minimal nausea the first few days which resolved with Reglan. she had mild to moderate fatigue but was able to continue working. She rested as needed but was functional.\par She has had anxiety for many years and is on olanzapine for this with Psychiatrist Dr Navin Canales.\par She is  with one son age 9. She works for [employer] in [employer], working remotely. She reports some strain in her marriage which is making her more anxious.\par \par HEALTH MAINTENANCE:\par PCP: Addis Peres MD\par GYN: Royce Goldman MD\par Mammogram: 21\par Pap smear: 21\par Endometrial polypectomy: 21 Endometrial curetting showed endometrial epithelium with proliferative features. Fragments of endometrial poly and proliferative endometrium\par Colonoscopy: none\par Genetic testin21 [facility] Breast cancer stat panel of 7 genes negative for pathogenic variants\par  [FreeTextEntry7] : The patient has underlying anxiety and her diagnosis and receiving chemotherapy is exacerbating this. She is under the care of a psychiatrist and will be followed by [facility].

## 2022-03-17 NOTE — PHYSICAL EXAM
[Fully active, able to carry on all pre-disease performance without restriction] : Status 0 - Fully active, able to carry on all pre-disease performance without restriction [Normal] : affect appropriate [de-identified] : s/p right mastectomy with well healed incision and expander in place

## 2022-03-23 ENCOUNTER — NON-APPOINTMENT (OUTPATIENT)
Age: 52
End: 2022-03-23

## 2022-03-31 ENCOUNTER — NON-APPOINTMENT (OUTPATIENT)
Age: 52
End: 2022-03-31

## 2022-03-31 ENCOUNTER — RESULT REVIEW (OUTPATIENT)
Age: 52
End: 2022-03-31

## 2022-03-31 ENCOUNTER — APPOINTMENT (OUTPATIENT)
Dept: HEMATOLOGY ONCOLOGY | Facility: CLINIC | Age: 52
End: 2022-03-31
Payer: COMMERCIAL

## 2022-03-31 ENCOUNTER — APPOINTMENT (OUTPATIENT)
Dept: INFUSION THERAPY | Facility: HOSPITAL | Age: 52
End: 2022-03-31

## 2022-03-31 VITALS
SYSTOLIC BLOOD PRESSURE: 109 MMHG | RESPIRATION RATE: 16 BRPM | DIASTOLIC BLOOD PRESSURE: 73 MMHG | BODY MASS INDEX: 22.79 KG/M2 | WEIGHT: 125.44 LBS | OXYGEN SATURATION: 98 % | HEIGHT: 62.01 IN | HEART RATE: 78 BPM | TEMPERATURE: 97 F

## 2022-03-31 LAB
BASOPHILS # BLD AUTO: 0 K/UL — SIGNIFICANT CHANGE UP (ref 0–0.2)
BASOPHILS NFR BLD AUTO: 0 % — SIGNIFICANT CHANGE UP (ref 0–2)
EOSINOPHIL # BLD AUTO: 0 K/UL — SIGNIFICANT CHANGE UP (ref 0–0.5)
EOSINOPHIL NFR BLD AUTO: 0 % — SIGNIFICANT CHANGE UP (ref 0–6)
HCT VFR BLD CALC: 35.4 % — SIGNIFICANT CHANGE UP (ref 34.5–45)
HGB BLD-MCNC: 11.4 G/DL — LOW (ref 11.5–15.5)
LYMPHOCYTES # BLD AUTO: 0.57 K/UL — LOW (ref 1–3.3)
LYMPHOCYTES # BLD AUTO: 5 % — LOW (ref 13–44)
MCHC RBC-ENTMCNC: 30.2 PG — SIGNIFICANT CHANGE UP (ref 27–34)
MCHC RBC-ENTMCNC: 32.2 G/DL — SIGNIFICANT CHANGE UP (ref 32–36)
MCV RBC AUTO: 93.7 FL — SIGNIFICANT CHANGE UP (ref 80–100)
METAMYELOCYTES # FLD: 2 % — HIGH (ref 0–0)
MONOCYTES # BLD AUTO: 1.38 K/UL — HIGH (ref 0–0.9)
MONOCYTES NFR BLD AUTO: 12 % — SIGNIFICANT CHANGE UP (ref 2–14)
MYELOCYTES NFR BLD: 6 % — HIGH (ref 0–0)
NEUTROPHILS # BLD AUTO: 8.61 K/UL — HIGH (ref 1.8–7.4)
NEUTROPHILS NFR BLD AUTO: 75 % — SIGNIFICANT CHANGE UP (ref 43–77)
NRBC # BLD: 0 /100 — SIGNIFICANT CHANGE UP (ref 0–0)
NRBC # BLD: SIGNIFICANT CHANGE UP /100 WBCS (ref 0–0)
PLAT MORPH BLD: NORMAL — SIGNIFICANT CHANGE UP
PLATELET # BLD AUTO: 258 K/UL — SIGNIFICANT CHANGE UP (ref 150–400)
RBC # BLD: 3.78 M/UL — LOW (ref 3.8–5.2)
RBC # FLD: 12.7 % — SIGNIFICANT CHANGE UP (ref 10.3–14.5)
RBC BLD AUTO: SIGNIFICANT CHANGE UP
WBC # BLD: 11.48 K/UL — HIGH (ref 3.8–10.5)
WBC # FLD AUTO: 11.48 K/UL — HIGH (ref 3.8–10.5)

## 2022-03-31 PROCEDURE — 99214 OFFICE O/P EST MOD 30 MIN: CPT

## 2022-03-31 RX ORDER — LORAZEPAM 0.5 MG/1
0.5 TABLET ORAL AS DIRECTED
Qty: 3 | Refills: 0 | Status: DISCONTINUED | COMMUNITY
Start: 2021-11-02 | End: 2022-03-31

## 2022-04-01 NOTE — HISTORY OF PRESENT ILLNESS
[Disease: _____________________] : Disease: [unfilled] [T: ___] : T[unfilled] [N: ___] : N[unfilled] [M: ___] : M[unfilled] [AJCC Stage: ____] : AJCC Stage: [unfilled] [Referred Patient  to social work for follow-up] : Patient was referred to social work for follow-up [de-identified] : Right triple negative breast cancer diagnosed at the age of 51.\par 07/17/21 Mammogram and breast US showed a spiculated mass in the right breast at the 12 o'clock axis 5 cm FN. \par 09/17/21 Unilateral Mammogram and breast US showed a persistent spiculated mass in the right breast about 12 o'clock axis corresponding with a 1.5 x 1.0 x 0.8 irregular hypoechoic nodule with posterior shadowing. Also noted was a 1.3 x 0.6 x 1.2 cm hypoechoic nodule questionable irregularity 10 o'clock axis right breast 2 cm FN. Multiple other small nodules bilaterally suggesting complicated and simple cysts. Bilateral mild duct ectasia retroareolar region. BI-RADS 5.\par 10/19/21 Right breast 12 o'clock axis 3 cm FN core biopsy: ~0.9 cm invasive mammary carcinoma with apocrine features, moderately differentiated ER<1%, [place] 0% and HER-2 (-)\par   Right breast 10 o'clock axis 2 cm FN core biopsy: ~0.7 cm Invasive mammary carcinoma, moderately differentiated. 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   Lymphovascular invasion is identified. The invasive and in situ mammary carcinoma shows a ductal phenotype but diminished/loss of membranous staining. \par 11/11/21 [facility] Breast cancer stat panel of 7 genes negative for pathogenic variants.\par 11/15/21 MRI Breast showed right upper central/slightly inner dominant enhancing mass measuring up to 3.3 cm and containing a biopsy clip, corresponding to one site of biopsy-proven malignancy. There is a 1.5 cm smaller irregular enhancing mass just inferior and lateral to the dominant aforementioned mass, corresponding to a second site of biopsy-proven malignancy. Small enhancing foci and non mass enhancement surrounding both masses, concerning for small satellite lesions and additional disease. There is 2.2 cm thick linear non mass enhancement more inferiorly in the right lateral 9:00 retroareolar location which extends to within 1.1 cm of the nipple. No MRI evidence of malignancy in the contralateral left breast. No lymphadenopathy. A 1.2 cm nonspecific T2 bright right hepatic lobe lesion which is incompletely visualized. Suggest dedicated liver MRI.\par 11/15/21 CT scan of C/A/P showed no imaging evidence of metastatic disease involving the chest, abdomen or pelvis. Two small cysts were noted in the right lobe of the liver but no suspicious lesions. Diffuse bladder wall thickening may indicate cystitis. \par 01/21/22 Right mastectomy and ALND by Dr. Cohen revealed a 2.1 cm IDC, SBR 6/9. Also noted was a 1.7 cm focus of DCIS, cribriform and solid types, intermediate nuclear grade with focal necrosis. Extensive lymphovascular invasion. 1/1 SLN positive with a 1.7 cm focus of disease. Right axillary lymph node dissection revealed 15/18 positive LN.\par She had immediate reconstruction with expander placement by Dr. Blane Ingram.\par 3/3/22 Adjuvant chemotherapy with Adriamycin (60 mg/m2) & Cytoxan (600 mg/m2) with Neulasta x 4 cycles to be followed by Taxol (175 mg/m2) x 4 cycles, all every 2 weeks\par  [de-identified] : 2.1 cm IDC, SBR 6/9 with 16/19 LN positive, no extranodal extension, triple negative [de-identified] : Idalmis presents to the office accompanied by her spouse for an evaluation and  AC cycle # 3 today\par She tolerated the first two cycle of chemotherapy fairly well aside from fatigue, which is worse for the first 2-3 days. The fatigue gets better little each day but still continues to have mild lingering fatigue.  She has also been attributing some of her fatigue not her high level of anxiety and not really sleeping to well at nights. \par Her psychiatrist has started her on Klonopin 0.5mg BID, but feels she may need a possible dose increase as she is still very anxious\par She reports episodes of nausea without vomiting , slightly intense the first week post treatment but is managed well with intake of Reglan. \par She developed sores on the oral commissures with some pain. She was given lidocaine to help with pain\par She has had anxiety for many years and is on olanzapine for this with Psychiatrist Dr Navin Canales.\par She is  with one son age 9. 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 She reports some strain in her marriage which is making her more anxious.\par \par HEALTH MAINTENANCE:\par PCP: Addis Peres MD\par GYN: Royce Goldman MD\par Mammogram: 21\par Pap smear: 21\par Endometrial polypectomy: 21 Endometrial curetting showed endometrial epithelium with proliferative features. Fragments of endometrial poly and proliferative endometrium\par Colonoscopy: none\par Genetic testin21 [facility] Breast cancer stat panel of 7 genes negative for pathogenic variants\par

## 2022-04-01 NOTE — PHYSICAL EXAM
[Fully active, able to carry on all pre-disease performance without restriction] : Status 0 - Fully active, able to carry on all pre-disease performance without restriction [Normal] : affect appropriate [de-identified] : s/p right mastectomy with well healed incision and expander in place

## 2022-04-14 ENCOUNTER — RESULT REVIEW (OUTPATIENT)
Age: 52
End: 2022-04-14

## 2022-04-14 ENCOUNTER — APPOINTMENT (OUTPATIENT)
Dept: HEMATOLOGY ONCOLOGY | Facility: CLINIC | Age: 52
End: 2022-04-14
Payer: COMMERCIAL

## 2022-04-14 ENCOUNTER — APPOINTMENT (OUTPATIENT)
Dept: INFUSION THERAPY | Facility: HOSPITAL | Age: 52
End: 2022-04-14

## 2022-04-14 VITALS
DIASTOLIC BLOOD PRESSURE: 74 MMHG | RESPIRATION RATE: 16 BRPM | WEIGHT: 126.96 LBS | HEART RATE: 77 BPM | SYSTOLIC BLOOD PRESSURE: 114 MMHG | BODY MASS INDEX: 23.22 KG/M2 | OXYGEN SATURATION: 99 % | TEMPERATURE: 97.3 F

## 2022-04-14 LAB
BASOPHILS # BLD AUTO: 0.13 K/UL — SIGNIFICANT CHANGE UP (ref 0–0.2)
BASOPHILS NFR BLD AUTO: 1 % — SIGNIFICANT CHANGE UP (ref 0–2)
BLASTS # FLD: 2 % — HIGH (ref 0–0)
EOSINOPHIL # BLD AUTO: 0.13 K/UL — SIGNIFICANT CHANGE UP (ref 0–0.5)
EOSINOPHIL NFR BLD AUTO: 1 % — SIGNIFICANT CHANGE UP (ref 0–6)
HCT VFR BLD CALC: 34.2 % — LOW (ref 34.5–45)
HGB BLD-MCNC: 10.9 G/DL — LOW (ref 11.5–15.5)
LYMPHOCYTES # BLD AUTO: 0.39 K/UL — LOW (ref 1–3.3)
LYMPHOCYTES # BLD AUTO: 3 % — LOW (ref 13–44)
MCHC RBC-ENTMCNC: 30.4 PG — SIGNIFICANT CHANGE UP (ref 27–34)
MCHC RBC-ENTMCNC: 31.9 G/DL — LOW (ref 32–36)
MCV RBC AUTO: 95.5 FL — SIGNIFICANT CHANGE UP (ref 80–100)
METAMYELOCYTES # FLD: 4 % — HIGH (ref 0–0)
MONOCYTES # BLD AUTO: 1.43 K/UL — HIGH (ref 0–0.9)
MONOCYTES NFR BLD AUTO: 11 % — SIGNIFICANT CHANGE UP (ref 2–14)
MYELOCYTES NFR BLD: 6 % — HIGH (ref 0–0)
NEUTROPHILS # BLD AUTO: 9.34 K/UL — HIGH (ref 1.8–7.4)
NEUTROPHILS NFR BLD AUTO: 69 % — SIGNIFICANT CHANGE UP (ref 43–77)
NEUTS BAND # BLD: 3 % — SIGNIFICANT CHANGE UP (ref 0–8)
NRBC # BLD: 0 /100 — SIGNIFICANT CHANGE UP (ref 0–0)
NRBC # BLD: SIGNIFICANT CHANGE UP /100 WBCS (ref 0–0)
PLAT MORPH BLD: NORMAL — SIGNIFICANT CHANGE UP
PLATELET # BLD AUTO: 281 K/UL — SIGNIFICANT CHANGE UP (ref 150–400)
RBC # BLD: 3.58 M/UL — LOW (ref 3.8–5.2)
RBC # FLD: 14.2 % — SIGNIFICANT CHANGE UP (ref 10.3–14.5)
RBC BLD AUTO: SIGNIFICANT CHANGE UP
WBC # BLD: 12.97 K/UL — HIGH (ref 3.8–10.5)
WBC # FLD AUTO: 12.97 K/UL — HIGH (ref 3.8–10.5)

## 2022-04-14 PROCEDURE — 99214 OFFICE O/P EST MOD 30 MIN: CPT

## 2022-04-14 PROCEDURE — 85060 BLOOD SMEAR INTERPRETATION: CPT

## 2022-04-14 NOTE — HISTORY OF PRESENT ILLNESS
[Disease: _____________________] : Disease: [unfilled] [T: ___] : T[unfilled] [N: ___] : N[unfilled] [M: ___] : M[unfilled] [AJCC Stage: ____] : AJCC Stage: [unfilled] [Referred Patient  to social work for follow-up] : Patient was referred to social work for follow-up [de-identified] : Right triple negative breast cancer diagnosed at the age of 51.\par 07/17/21 Mammogram and breast US showed a spiculated mass in the right breast at the 12 o'clock axis 5 cm FN. \par 09/17/21 Unilateral Mammogram and breast US showed a persistent spiculated mass in the right breast about 12 o'clock axis corresponding with a 1.5 x 1.0 x 0.8 irregular hypoechoic nodule with posterior shadowing. Also noted was a 1.3 x 0.6 x 1.2 cm hypoechoic nodule questionable irregularity 10 o'clock axis right breast 2 cm FN. Multiple other small nodules bilaterally suggesting complicated and simple cysts. Bilateral mild duct ectasia retroareolar region. BI-RADS 5.\par 10/19/21 Right breast 12 o'clock axis 3 cm FN core biopsy: ~0.9 cm invasive mammary carcinoma with apocrine features, moderately differentiated ER<1%, [place] 0% and HER-2 (-)\par   Right breast 10 o'clock axis 2 cm FN core biopsy: ~0.7 cm Invasive mammary carcinoma, moderately differentiated. 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   Lymphovascular invasion is identified. The invasive and in situ mammary carcinoma shows a ductal phenotype but diminished/loss of membranous staining. \par 11/11/21 [facility] Breast cancer stat panel of 7 genes negative for pathogenic variants.\par 11/15/21 MRI Breast showed right upper central/slightly inner dominant enhancing mass measuring up to 3.3 cm and containing a biopsy clip, corresponding to one site of biopsy-proven malignancy. There is a 1.5 cm smaller irregular enhancing mass just inferior and lateral to the dominant aforementioned mass, corresponding to a second site of biopsy-proven malignancy. Small enhancing foci and non mass enhancement surrounding both masses, concerning for small satellite lesions and additional disease. There is 2.2 cm thick linear non mass enhancement more inferiorly in the right lateral 9:00 retroareolar location which extends to within 1.1 cm of the nipple. No MRI evidence of malignancy in the contralateral left breast. No lymphadenopathy. A 1.2 cm nonspecific T2 bright right hepatic lobe lesion which is incompletely visualized. Suggest dedicated liver MRI.\par 11/15/21 CT scan of C/A/P showed no imaging evidence of metastatic disease involving the chest, abdomen or pelvis. Two small cysts were noted in the right lobe of the liver but no suspicious lesions. Diffuse bladder wall thickening may indicate cystitis. \par 01/21/22 Right mastectomy and ALND by Dr. Cohen revealed a 2.1 cm IDC, SBR 6/9. Also noted was a 1.7 cm focus of DCIS, cribriform and solid types, intermediate nuclear grade with focal necrosis. Extensive lymphovascular invasion. 1/1 SLN positive with a 1.7 cm focus of disease. Right axillary lymph node dissection revealed 15/18 positive LN.\par She had immediate reconstruction with expander placement by Dr. Blane Ingram.\par 3/3/22 Adjuvant chemotherapy with Adriamycin (60 mg/m2) & Cytoxan (600 mg/m2) with Neulasta x 4 cycles to be followed by Taxol (175 mg/m2) x 4 cycles, all every 2 weeks\par  [de-identified] : 2.1 cm IDC, SBR 6/9 with 16/19 LN positive, no extranodal extension, triple negative [de-identified] : Idalmis is here today for AC cycle # 4. She is tolerating chemo fairly well with the fatigue being about the same with each cycle.\par She continues to have occasional nausea without vomiting and takes Reglan as needed which is effective\par She developed sores on the oral commissures with some pain but it was better with this last cycle and the topical lidocaine helped.\par She has had anxiety for many years and is on olanzapine for this with Psychiatrist Dr Navin Canales. Her psychiatrist started her on Klonopin 0.5 mg BID, but as she continued to be very anxious and have trouble sleeping she started venlafaxine 37.5 mg in AM and increased Zyprexa from 5 to 10.\par She is  with one son age 9. She works for [employer] in [employer], working remotely. \par \par HEALTH MAINTENANCE:\par PCP: Addis Peres MD\par GYN: Royce Goldman MD\par Mammogram: 21\par Pap smear: 21\par Endometrial polypectomy: 21 Endometrial curetting showed endometrial epithelium with proliferative features. Fragments of endometrial poly and proliferative endometrium\par Colonoscopy: none\par Genetic testin21 [facility] Breast cancer stat panel of 7 genes negative for pathogenic variants\par

## 2022-04-14 NOTE — PHYSICAL EXAM
[Fully active, able to carry on all pre-disease performance without restriction] : Status 0 - Fully active, able to carry on all pre-disease performance without restriction [Normal] : affect appropriate [de-identified] : s/p right mastectomy with well healed incision and expander in place

## 2022-04-15 LAB
MANUAL DIF COMMENT BLD-IMP: SIGNIFICANT CHANGE UP

## 2022-04-22 ENCOUNTER — OUTPATIENT (OUTPATIENT)
Dept: OUTPATIENT SERVICES | Facility: HOSPITAL | Age: 52
LOS: 1 days | Discharge: HOME | End: 2022-04-22

## 2022-04-22 DIAGNOSIS — Z98.891 HISTORY OF UTERINE SCAR FROM PREVIOUS SURGERY: Chronic | ICD-10-CM

## 2022-04-22 DIAGNOSIS — Z98.890 OTHER SPECIFIED POSTPROCEDURAL STATES: Chronic | ICD-10-CM

## 2022-04-22 DIAGNOSIS — C50.919 MALIGNANT NEOPLASM OF UNSPECIFIED SITE OF UNSPECIFIED FEMALE BREAST: ICD-10-CM

## 2022-04-22 DIAGNOSIS — Z90.11 ACQUIRED ABSENCE OF RIGHT BREAST AND NIPPLE: Chronic | ICD-10-CM

## 2022-04-28 ENCOUNTER — APPOINTMENT (OUTPATIENT)
Dept: HEMATOLOGY ONCOLOGY | Facility: CLINIC | Age: 52
End: 2022-04-28
Payer: COMMERCIAL

## 2022-04-28 ENCOUNTER — RESULT REVIEW (OUTPATIENT)
Age: 52
End: 2022-04-28

## 2022-04-28 ENCOUNTER — APPOINTMENT (OUTPATIENT)
Dept: INFUSION THERAPY | Facility: HOSPITAL | Age: 52
End: 2022-04-28

## 2022-04-28 VITALS
HEART RATE: 107 BPM | SYSTOLIC BLOOD PRESSURE: 107 MMHG | TEMPERATURE: 97 F | WEIGHT: 127.87 LBS | DIASTOLIC BLOOD PRESSURE: 70 MMHG | BODY MASS INDEX: 23.38 KG/M2 | RESPIRATION RATE: 18 BRPM | OXYGEN SATURATION: 97 %

## 2022-04-28 LAB
BASOPHILS # BLD AUTO: 0 K/UL — SIGNIFICANT CHANGE UP (ref 0–0.2)
BASOPHILS NFR BLD AUTO: 0 % — SIGNIFICANT CHANGE UP (ref 0–2)
DACRYOCYTES BLD QL SMEAR: SLIGHT — SIGNIFICANT CHANGE UP
EOSINOPHIL # BLD AUTO: 0 K/UL — SIGNIFICANT CHANGE UP (ref 0–0.5)
EOSINOPHIL NFR BLD AUTO: 0 % — SIGNIFICANT CHANGE UP (ref 0–6)
HCT VFR BLD CALC: 30.6 % — LOW (ref 34.5–45)
HGB BLD-MCNC: 9.8 G/DL — LOW (ref 11.5–15.5)
LYMPHOCYTES # BLD AUTO: 0.39 K/UL — LOW (ref 1–3.3)
LYMPHOCYTES # BLD AUTO: 2 % — LOW (ref 13–44)
MCHC RBC-ENTMCNC: 31.1 PG — SIGNIFICANT CHANGE UP (ref 27–34)
MCHC RBC-ENTMCNC: 32 G/DL — SIGNIFICANT CHANGE UP (ref 32–36)
MCV RBC AUTO: 97.1 FL — SIGNIFICANT CHANGE UP (ref 80–100)
METAMYELOCYTES # FLD: 6 % — HIGH (ref 0–0)
MONOCYTES # BLD AUTO: 0.39 K/UL — SIGNIFICANT CHANGE UP (ref 0–0.9)
MONOCYTES NFR BLD AUTO: 2 % — SIGNIFICANT CHANGE UP (ref 2–14)
MYELOCYTES NFR BLD: 7 % — HIGH (ref 0–0)
NEUTROPHILS # BLD AUTO: 16.16 K/UL — HIGH (ref 1.8–7.4)
NEUTROPHILS NFR BLD AUTO: 83 % — HIGH (ref 43–77)
NRBC # BLD: 1 /100 — HIGH (ref 0–0)
NRBC # BLD: SIGNIFICANT CHANGE UP /100 WBCS (ref 0–0)
PLAT MORPH BLD: NORMAL — SIGNIFICANT CHANGE UP
PLATELET # BLD AUTO: 272 K/UL — SIGNIFICANT CHANGE UP (ref 150–400)
POIKILOCYTOSIS BLD QL AUTO: SLIGHT — SIGNIFICANT CHANGE UP
RBC # BLD: 3.15 M/UL — LOW (ref 3.8–5.2)
RBC # FLD: 16 % — HIGH (ref 10.3–14.5)
RBC BLD AUTO: ABNORMAL
WBC # BLD: 19.47 K/UL — HIGH (ref 3.8–10.5)
WBC # FLD AUTO: 19.47 K/UL — HIGH (ref 3.8–10.5)

## 2022-04-28 PROCEDURE — 99214 OFFICE O/P EST MOD 30 MIN: CPT

## 2022-04-29 ENCOUNTER — NON-APPOINTMENT (OUTPATIENT)
Age: 52
End: 2022-04-29

## 2022-04-29 DIAGNOSIS — Z51.11 ENCOUNTER FOR ANTINEOPLASTIC CHEMOTHERAPY: ICD-10-CM

## 2022-04-29 DIAGNOSIS — R11.2 NAUSEA WITH VOMITING, UNSPECIFIED: ICD-10-CM

## 2022-05-02 NOTE — HISTORY OF PRESENT ILLNESS
[Disease: _____________________] : Disease: [unfilled] [T: ___] : T[unfilled] [N: ___] : N[unfilled] [M: ___] : M[unfilled] [AJCC Stage: ____] : AJCC Stage: [unfilled] [de-identified] : Right triple negative breast cancer diagnosed at the age of 51.\par 07/17/21 Mammogram and breast US showed a spiculated mass in the right breast at the 12 o'clock axis 5 cm FN. \par 09/17/21 Unilateral Mammogram and breast US showed a persistent spiculated mass in the right breast about 12 o'clock axis corresponding with a 1.5 x 1.0 x 0.8 irregular hypoechoic nodule with posterior shadowing. Also noted was a 1.3 x 0.6 x 1.2 cm hypoechoic nodule questionable irregularity 10 o'clock axis right breast 2 cm FN. Multiple other small nodules bilaterally suggesting complicated and simple cysts. Bilateral mild duct ectasia retroareolar region. BI-RADS 5.\par 10/19/21 Right breast 12 o'clock axis 3 cm FN core biopsy: ~0.9 cm invasive mammary carcinoma with apocrine features, moderately differentiated ER<1%, [place] 0% and HER-2 (-)\par   Right breast 10 o'clock axis 2 cm FN core biopsy: ~0.7 cm Invasive mammary carcinoma, moderately differentiated. 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   Lymphovascular invasion is identified. The invasive and in situ mammary carcinoma shows a ductal phenotype but diminished/loss of membranous staining. \par 11/11/21 [facility] Breast cancer stat panel of 7 genes negative for pathogenic variants.\par 11/15/21 MRI Breast showed right upper central/slightly inner dominant enhancing mass measuring up to 3.3 cm and containing a biopsy clip, corresponding to one site of biopsy-proven malignancy. There is a 1.5 cm smaller irregular enhancing mass just inferior and lateral to the dominant aforementioned mass, corresponding to a second site of biopsy-proven malignancy. Small enhancing foci and non mass enhancement surrounding both masses, concerning for small satellite lesions and additional disease. There is 2.2 cm thick linear non mass enhancement more inferiorly in the right lateral 9:00 retroareolar location which extends to within 1.1 cm of the nipple. No MRI evidence of malignancy in the contralateral left breast. No lymphadenopathy. A 1.2 cm nonspecific T2 bright right hepatic lobe lesion which is incompletely visualized. Suggest dedicated liver MRI.\par 11/15/21 CT scan of C/A/P showed no imaging evidence of metastatic disease involving the chest, abdomen or pelvis. Two small cysts were noted in the right lobe of the liver but no suspicious lesions. Diffuse bladder wall thickening may indicate cystitis. \par 01/21/22 Right mastectomy and ALND by Dr. Cohen revealed a 2.1 cm IDC, SBR 6/9. Also noted was a 1.7 cm focus of DCIS, cribriform and solid types, intermediate nuclear grade with focal necrosis. Extensive lymphovascular invasion. 1/1 SLN positive with a 1.7 cm focus of disease. Right axillary lymph node dissection revealed 15/18 positive LN.\par She had immediate reconstruction with expander placement by Dr. Blane Ingram.\par 3/3/22 Adjuvant chemotherapy with Adriamycin (60 mg/m2) & Cytoxan (600 mg/m2) with Neulasta x 4 cycles to be followed by Taxol (175 mg/m2) x 4 cycles, all every 2 weeks\par  [de-identified] : 2.1 cm IDC, SBR 6/9 with 16/19 LN positive, no extranodal extension, triple negative [de-identified] : Idalmis presents to the office accompanied by her spouse for an evaluation and for Taxol cycle # 1.\par She reports feeling more fatigued with C4 of AC than with the previous cycles. She also had more body/bone aches. She took Advil for a few days with some relief.\par The nausea is controlled well with Reglan as needed\par She continues to have sores on the oral commissures with some pain and painful mouth sores " my tongue was very raw and I couldn't eat or drink"\par She has had anxiety for many years and is on olanzapine for this with Psychiatrist Dr Navin Canales. Her psychiatrist started her on Klonopin 0.5 mg BID, but as she continued to be very anxious and have trouble sleeping she started venlafaxine 37.5 mg in AM and increased Zyprexa from 5 to 10, which has been helping her relax and sleep at nights.\par She is  with one son age 9. She works for [employer] in [employer], working remotely. \par \par HEALTH MAINTENANCE:\par PCP: Addis Peres MD\par GYN: Royce Goldman MD\par Mammogram: 21\par Pap smear: 21\par Endometrial polypectomy: 21 Endometrial curetting showed endometrial epithelium with proliferative features. Fragments of endometrial poly and proliferative endometrium\par Colonoscopy: none\par Genetic testin21 [facility] Breast cancer stat panel of 7 genes negative for pathogenic variants\par

## 2022-05-02 NOTE — PHYSICAL EXAM
[Fully active, able to carry on all pre-disease performance without restriction] : Status 0 - Fully active, able to carry on all pre-disease performance without restriction [Normal] : affect appropriate [de-identified] : s/p right mastectomy with well healed incision and expander in place

## 2022-05-09 ENCOUNTER — LABORATORY RESULT (OUTPATIENT)
Age: 52
End: 2022-05-09

## 2022-05-10 LAB
ALBUMIN SERPL ELPH-MCNC: 4.8 G/DL
ALP BLD-CCNC: 99 U/L
ALT SERPL-CCNC: 40 U/L
ANION GAP SERPL CALC-SCNC: 15 MMOL/L
AST SERPL-CCNC: 32 U/L
BASOPHILS # BLD AUTO: 0 K/UL
BASOPHILS NFR BLD AUTO: 0 %
BILIRUB SERPL-MCNC: 0.2 MG/DL
BUN SERPL-MCNC: 14 MG/DL
CALCIUM SERPL-MCNC: 9.9 MG/DL
CHLORIDE SERPL-SCNC: 106 MMOL/L
CO2 SERPL-SCNC: 24 MMOL/L
CREAT SERPL-MCNC: 0.66 MG/DL
EGFR: 107 ML/MIN/1.73M2
EOSINOPHIL # BLD AUTO: 0 K/UL
EOSINOPHIL NFR BLD AUTO: 0 %
GLUCOSE SERPL-MCNC: 96 MG/DL
HCT VFR BLD CALC: 33.9 %
HGB BLD-MCNC: 9.7 G/DL
LYMPHOCYTES # BLD AUTO: 0.88 K/UL
LYMPHOCYTES NFR BLD AUTO: 14.6 %
MAN DIFF?: NORMAL
MCHC RBC-ENTMCNC: 28.6 GM/DL
MCHC RBC-ENTMCNC: 30.4 PG
MCV RBC AUTO: 106.3 FL
MONOCYTES # BLD AUTO: 0.57 K/UL
MONOCYTES NFR BLD AUTO: 9.5 %
NEUTROPHILS # BLD AUTO: 4.58 K/UL
NEUTROPHILS NFR BLD AUTO: 75.9 %
PLATELET # BLD AUTO: 508 K/UL
POTASSIUM SERPL-SCNC: 4 MMOL/L
PROT SERPL-MCNC: 6.7 G/DL
RBC # BLD: 3.19 M/UL
RBC # FLD: 17.6 %
SODIUM SERPL-SCNC: 146 MMOL/L
WBC # FLD AUTO: 6.03 K/UL

## 2022-05-12 ENCOUNTER — RESULT REVIEW (OUTPATIENT)
Age: 52
End: 2022-05-12

## 2022-05-12 ENCOUNTER — APPOINTMENT (OUTPATIENT)
Dept: INFUSION THERAPY | Facility: HOSPITAL | Age: 52
End: 2022-05-12

## 2022-05-12 ENCOUNTER — APPOINTMENT (OUTPATIENT)
Dept: HEMATOLOGY ONCOLOGY | Facility: CLINIC | Age: 52
End: 2022-05-12
Payer: COMMERCIAL

## 2022-05-12 VITALS
OXYGEN SATURATION: 99 % | DIASTOLIC BLOOD PRESSURE: 73 MMHG | WEIGHT: 127.87 LBS | SYSTOLIC BLOOD PRESSURE: 115 MMHG | BODY MASS INDEX: 23.38 KG/M2 | HEART RATE: 71 BPM | RESPIRATION RATE: 16 BRPM | TEMPERATURE: 97.2 F

## 2022-05-12 DIAGNOSIS — K12.30 ORAL MUCOSITIS (ULCERATIVE), UNSPECIFIED: ICD-10-CM

## 2022-05-12 LAB
BASOPHILS # BLD AUTO: 0.06 K/UL — SIGNIFICANT CHANGE UP (ref 0–0.2)
BASOPHILS NFR BLD AUTO: 1 % — SIGNIFICANT CHANGE UP (ref 0–2)
EOSINOPHIL # BLD AUTO: 0.09 K/UL — SIGNIFICANT CHANGE UP (ref 0–0.5)
EOSINOPHIL NFR BLD AUTO: 1.6 % — SIGNIFICANT CHANGE UP (ref 0–6)
HCT VFR BLD CALC: 31.8 % — LOW (ref 34.5–45)
HGB BLD-MCNC: 9.9 G/DL — LOW (ref 11.5–15.5)
IMM GRANULOCYTES NFR BLD AUTO: 0.7 % — SIGNIFICANT CHANGE UP (ref 0–1.5)
LYMPHOCYTES # BLD AUTO: 0.36 K/UL — LOW (ref 1–3.3)
LYMPHOCYTES # BLD AUTO: 6.3 % — LOW (ref 13–44)
MCHC RBC-ENTMCNC: 30.7 PG — SIGNIFICANT CHANGE UP (ref 27–34)
MCHC RBC-ENTMCNC: 31.1 G/DL — LOW (ref 32–36)
MCV RBC AUTO: 98.5 FL — SIGNIFICANT CHANGE UP (ref 80–100)
MONOCYTES # BLD AUTO: 0.7 K/UL — SIGNIFICANT CHANGE UP (ref 0–0.9)
MONOCYTES NFR BLD AUTO: 12.2 % — SIGNIFICANT CHANGE UP (ref 2–14)
NEUTROPHILS # BLD AUTO: 4.51 K/UL — SIGNIFICANT CHANGE UP (ref 1.8–7.4)
NEUTROPHILS NFR BLD AUTO: 78.2 % — HIGH (ref 43–77)
NRBC # BLD: 0 /100 WBCS — SIGNIFICANT CHANGE UP (ref 0–0)
PLATELET # BLD AUTO: 354 K/UL — SIGNIFICANT CHANGE UP (ref 150–400)
RBC # BLD: 3.23 M/UL — LOW (ref 3.8–5.2)
RBC # FLD: 16.2 % — HIGH (ref 10.3–14.5)
WBC # BLD: 5.76 K/UL — SIGNIFICANT CHANGE UP (ref 3.8–10.5)
WBC # FLD AUTO: 5.76 K/UL — SIGNIFICANT CHANGE UP (ref 3.8–10.5)

## 2022-05-12 PROCEDURE — 99214 OFFICE O/P EST MOD 30 MIN: CPT

## 2022-05-12 NOTE — HISTORY OF PRESENT ILLNESS
[Disease: _____________________] : Disease: [unfilled] [T: ___] : T[unfilled] [N: ___] : N[unfilled] [M: ___] : M[unfilled] [AJCC Stage: ____] : AJCC Stage: [unfilled] [de-identified] : Right triple negative breast cancer diagnosed at the age of 51.\par 07/17/21 Mammogram and breast US showed a spiculated mass in the right breast at the 12 o'clock axis 5 cm FN. \par 09/17/21 Unilateral Mammogram and breast US showed a persistent spiculated mass in the right breast about 12 o'clock axis corresponding with a 1.5 x 1.0 x 0.8 irregular hypoechoic nodule with posterior shadowing. Also noted was a 1.3 x 0.6 x 1.2 cm hypoechoic nodule questionable irregularity 10 o'clock axis right breast 2 cm FN. Multiple other small nodules bilaterally suggesting complicated and simple cysts. Bilateral mild duct ectasia retroareolar region. BI-RADS 5.\par 10/19/21 Right breast 12 o'clock axis 3 cm FN core biopsy: ~0.9 cm invasive mammary carcinoma with apocrine features, moderately differentiated ER<1%, [place] 0% and HER-2 (-)\par   Right breast 10 o'clock axis 2 cm FN core biopsy: ~0.7 cm Invasive mammary carcinoma, moderately differentiated. 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   Lymphovascular invasion is identified. The invasive and in situ mammary carcinoma shows a ductal phenotype but diminished/loss of membranous staining. \par 11/11/21 [facility] Breast cancer stat panel of 7 genes negative for pathogenic variants.\par 11/15/21 MRI Breast showed right upper central/slightly inner dominant enhancing mass measuring up to 3.3 cm and containing a biopsy clip, corresponding to one site of biopsy-proven malignancy. There is a 1.5 cm smaller irregular enhancing mass just inferior and lateral to the dominant aforementioned mass, corresponding to a second site of biopsy-proven malignancy. Small enhancing foci and non mass enhancement surrounding both masses, concerning for small satellite lesions and additional disease. There is 2.2 cm thick linear non mass enhancement more inferiorly in the right lateral 9:00 retroareolar location which extends to within 1.1 cm of the nipple. No MRI evidence of malignancy in the contralateral left breast. No lymphadenopathy. A 1.2 cm nonspecific T2 bright right hepatic lobe lesion which is incompletely visualized. Suggest dedicated liver MRI.\par 11/15/21 CT scan of C/A/P showed no imaging evidence of metastatic disease involving the chest, abdomen or pelvis. Two small cysts were noted in the right lobe of the liver but no suspicious lesions. Diffuse bladder wall thickening may indicate cystitis. \par 01/21/22 Right mastectomy and ALND by Dr. Cohen revealed a 2.1 cm IDC, SBR 6/9. Also noted was a 1.7 cm focus of DCIS, cribriform and solid types, intermediate nuclear grade with focal necrosis. Extensive lymphovascular invasion. 1/1 SLN positive with a 1.7 cm focus of disease. Right axillary lymph node dissection revealed 15/18 positive LN.\par She had immediate reconstruction with expander placement by Dr. Blane Ingram.\par 3/3/22 - 6/9/22 Adjuvant chemotherapy with dose dense Adriamycin (60 mg/m2) & Cytoxan (600 mg/m2) with Neulasta x 4 cycles followed by Taxol (175 mg/m2) x 4 cycles, all given every 2 weeks\par  [de-identified] : 2.1 cm IDC, SBR 6/9 with 16/19 LN positive, no extranodal extension, triple negative [de-identified] : Idalmis presents to the office accompanied by her friend for an evaluation and for Taxol cycle # 2.\par She tolerated C1 Taxol well aside from fatigue with no nausea, constipation, heartburn or neuropathy.\par She has had anxiety for many years and is on olanzapine for this with Psychiatrist Dr Navin Canales. Her psychiatrist started her on Klonopin 0.5 mg BID, but as she continued to be very anxious and have trouble sleeping she started venlafaxine 37.5 mg in AM and increased Zyprexa from 5 to 10, which has been helping her relax and sleep at nights.\par She is  with one son age 9. She works for [employer] in [employer], working remotely. \par \par HEALTH MAINTENANCE:\par PCP: Addis Peres MD\par GYN: Royce Goldman MD\par Mammogram: 21\par Pap smear: 21\par Endometrial polypectomy: 21 Endometrial curetting showed endometrial epithelium with proliferative features. Fragments of endometrial poly and proliferative endometrium\par Colonoscopy: none\par Genetic testin21 [facility] Breast cancer stat panel of 7 genes negative for pathogenic variants\par

## 2022-05-12 NOTE — PHYSICAL EXAM
[Fully active, able to carry on all pre-disease performance without restriction] : Status 0 - Fully active, able to carry on all pre-disease performance without restriction [Normal] : affect appropriate [de-identified] : s/p right mastectomy with well healed incision and expander in place

## 2022-05-20 ENCOUNTER — OUTPATIENT (OUTPATIENT)
Dept: OUTPATIENT SERVICES | Facility: HOSPITAL | Age: 52
LOS: 1 days | Discharge: HOME | End: 2022-05-20

## 2022-05-20 DIAGNOSIS — C50.919 MALIGNANT NEOPLASM OF UNSPECIFIED SITE OF UNSPECIFIED FEMALE BREAST: ICD-10-CM

## 2022-05-20 DIAGNOSIS — Z98.891 HISTORY OF UTERINE SCAR FROM PREVIOUS SURGERY: Chronic | ICD-10-CM

## 2022-05-20 DIAGNOSIS — Z90.11 ACQUIRED ABSENCE OF RIGHT BREAST AND NIPPLE: Chronic | ICD-10-CM

## 2022-05-20 DIAGNOSIS — Z98.890 OTHER SPECIFIED POSTPROCEDURAL STATES: Chronic | ICD-10-CM

## 2022-05-23 ENCOUNTER — LABORATORY RESULT (OUTPATIENT)
Age: 52
End: 2022-05-23

## 2022-05-23 LAB
ALBUMIN SERPL ELPH-MCNC: 4.7 G/DL
ALP BLD-CCNC: 101 U/L
ALT SERPL-CCNC: 33 U/L
ANION GAP SERPL CALC-SCNC: 11 MMOL/L
AST SERPL-CCNC: 26 U/L
BASOPHILS # BLD AUTO: 0.03 K/UL
BASOPHILS NFR BLD AUTO: 0.4 %
BILIRUB SERPL-MCNC: 0.2 MG/DL
BUN SERPL-MCNC: 11 MG/DL
CALCIUM SERPL-MCNC: 9.1 MG/DL
CHLORIDE SERPL-SCNC: 111 MMOL/L
CO2 SERPL-SCNC: 24 MMOL/L
CREAT SERPL-MCNC: 0.65 MG/DL
EGFR: 108 ML/MIN/1.73M2
EOSINOPHIL # BLD AUTO: 0.19 K/UL
EOSINOPHIL NFR BLD AUTO: 2.8 %
GLUCOSE SERPL-MCNC: 103 MG/DL
HCT VFR BLD CALC: 31.7 %
HGB BLD-MCNC: 9.7 G/DL
IMM GRANULOCYTES NFR BLD AUTO: 0.6 %
LYMPHOCYTES # BLD AUTO: 0.48 K/UL
LYMPHOCYTES NFR BLD AUTO: 7.1 %
MAN DIFF?: NORMAL
MCHC RBC-ENTMCNC: 30.6 GM/DL
MCHC RBC-ENTMCNC: 31.4 PG
MCV RBC AUTO: 102.6 FL
MONOCYTES # BLD AUTO: 0.53 K/UL
MONOCYTES NFR BLD AUTO: 7.9 %
NEUTROPHILS # BLD AUTO: 5.48 K/UL
NEUTROPHILS NFR BLD AUTO: 81.2 %
PLATELET # BLD AUTO: 277 K/UL
POTASSIUM SERPL-SCNC: 3.8 MMOL/L
PROT SERPL-MCNC: 6.6 G/DL
RBC # BLD: 3.09 M/UL
RBC # FLD: 16.3 %
SODIUM SERPL-SCNC: 146 MMOL/L
WBC # FLD AUTO: 6.75 K/UL

## 2022-05-26 ENCOUNTER — APPOINTMENT (OUTPATIENT)
Dept: INFUSION THERAPY | Facility: HOSPITAL | Age: 52
End: 2022-05-26

## 2022-05-26 ENCOUNTER — APPOINTMENT (OUTPATIENT)
Dept: HEMATOLOGY ONCOLOGY | Facility: CLINIC | Age: 52
End: 2022-05-26
Payer: COMMERCIAL

## 2022-05-26 VITALS
HEIGHT: 62.01 IN | TEMPERATURE: 97.7 F | SYSTOLIC BLOOD PRESSURE: 108 MMHG | RESPIRATION RATE: 16 BRPM | BODY MASS INDEX: 23.07 KG/M2 | HEART RATE: 79 BPM | WEIGHT: 126.99 LBS | OXYGEN SATURATION: 99 % | DIASTOLIC BLOOD PRESSURE: 75 MMHG

## 2022-05-26 PROCEDURE — 99214 OFFICE O/P EST MOD 30 MIN: CPT

## 2022-05-26 RX ORDER — ALPRAZOLAM 2 MG/1
TABLET ORAL
Refills: 0 | Status: DISCONTINUED | COMMUNITY
End: 2022-05-26

## 2022-05-26 RX ORDER — DEXAMETHASONE 4 MG/1
4 TABLET ORAL
Qty: 24 | Refills: 0 | Status: DISCONTINUED | COMMUNITY
Start: 2022-03-01 | End: 2022-05-26

## 2022-05-26 RX ORDER — OLANZAPINE 2.5 MG/1
2.5 TABLET ORAL
Refills: 0 | Status: DISCONTINUED | COMMUNITY
Start: 2022-03-31 | End: 2022-05-26

## 2022-05-26 RX ORDER — DEXAMETHASONE 4 MG/1
4 TABLET ORAL
Qty: 10 | Refills: 0 | Status: DISCONTINUED | COMMUNITY
Start: 2022-04-14 | End: 2022-05-26

## 2022-05-26 RX ORDER — APREPITANT 80 MG/1
80 CAPSULE ORAL
Qty: 2 | Refills: 1 | Status: DISCONTINUED | COMMUNITY
Start: 2022-03-01 | End: 2022-05-26

## 2022-05-26 NOTE — HISTORY OF PRESENT ILLNESS
[Disease: _____________________] : Disease: [unfilled] [T: ___] : T[unfilled] [N: ___] : N[unfilled] [M: ___] : M[unfilled] [AJCC Stage: ____] : AJCC Stage: [unfilled] [de-identified] : Right triple negative breast cancer diagnosed at the age of 51.\par 07/17/21 Mammogram and breast US showed a spiculated mass in the right breast at the 12 o'clock axis 5 cm FN. \par 09/17/21 Unilateral Mammogram and breast US showed a persistent spiculated mass in the right breast about 12 o'clock axis corresponding with a 1.5 x 1.0 x 0.8 irregular hypoechoic nodule with posterior shadowing. Also noted was a 1.3 x 0.6 x 1.2 cm hypoechoic nodule questionable irregularity 10 o'clock axis right breast 2 cm FN. Multiple other small nodules bilaterally suggesting complicated and simple cysts. Bilateral mild duct ectasia retroareolar region. BI-RADS 5.\par 10/19/21 Right breast 12 o'clock axis 3 cm FN core biopsy: ~0.9 cm invasive mammary carcinoma with apocrine features, moderately differentiated ER<1%, [place] 0% and HER-2 (-)\par   Right breast 10 o'clock axis 2 cm FN core biopsy: ~0.7 cm Invasive mammary carcinoma, moderately differentiated. 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   Lymphovascular invasion is identified. The invasive and in situ mammary carcinoma shows a ductal phenotype but diminished/loss of membranous staining. \par 11/11/21 [facility] Breast cancer stat panel of 7 genes negative for pathogenic variants.\par 11/15/21 MRI Breast showed right upper central/slightly inner dominant enhancing mass measuring up to 3.3 cm and containing a biopsy clip, corresponding to one site of biopsy-proven malignancy. There is a 1.5 cm smaller irregular enhancing mass just inferior and lateral to the dominant aforementioned mass, corresponding to a second site of biopsy-proven malignancy. Small enhancing foci and non mass enhancement surrounding both masses, concerning for small satellite lesions and additional disease. There is 2.2 cm thick linear non mass enhancement more inferiorly in the right lateral 9:00 retroareolar location which extends to within 1.1 cm of the nipple. No MRI evidence of malignancy in the contralateral left breast. No lymphadenopathy. A 1.2 cm nonspecific T2 bright right hepatic lobe lesion which is incompletely visualized. Suggest dedicated liver MRI.\par 11/15/21 CT scan of C/A/P showed no imaging evidence of metastatic disease involving the chest, abdomen or pelvis. Two small cysts were noted in the right lobe of the liver but no suspicious lesions. Diffuse bladder wall thickening may indicate cystitis. \par 01/21/22 Right mastectomy and ALND by Dr. Cohen revealed a 2.1 cm IDC, SBR 6/9. Also noted was a 1.7 cm focus of DCIS, cribriform and solid types, intermediate nuclear grade with focal necrosis. Extensive lymphovascular invasion. 1/1 SLN positive with a 1.7 cm focus of disease. Right axillary lymph node dissection revealed 15/18 positive LN.\par She had immediate reconstruction with expander placement by Dr. Blane Ingram.\par 3/3/22 - 6/9/22 Adjuvant chemotherapy with dose dense Adriamycin (60 mg/m2) & Cytoxan (600 mg/m2) with Neulasta x 4 cycles followed by Taxol (175 mg/m2) x 4 cycles, all given every 2 weeks\par  [de-identified] : 2.1 cm IDC, SBR 6/9 with 16/19 LN positive, no extranodal extension, triple negative [de-identified] : Idalmis is accompanied by her spouse for an evaluation and for Taxol cycle # 3.\par She overall tolerated the last treatment well aside from experiencing some fatigue for the first few days post chemotherapy.\par She mentions experiencing bilateral thumb numbness sensation immediately after Taxol cycle # 2, which comes and goes without any tenderness or pain.\par She continues to have sores on the oral commissures with some pain and painful mouth sores " my tongue was very raw and I couldn't eat or drink". She has been using magic mouth wash some resolution when trying to consume food.\par She denies any fever, chills, night sweats, CP, SOB, heartburn, nausea, vomiting, diarrhea or constipation.\par She has had anxiety for many years and is on olanzapine for this with Psychiatrist Dr Navin Canales. Her psychiatrist started her on Klonopin 0.5 mg BID,  and  venlafaxine 37.5 mg in AM and increased Zyprexa from 5 to 10, which has been helping her relax and sleep at nights. She is however not happy with her Psychiatrist and is currently look to find another one.\par She is  with one son age 9. 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 \par \par HEALTH MAINTENANCE:\par PCP: Addis Peres MD\par GYN: Royce Goldman MD\par Mammogram: 21\par Pap smear: 21\par Endometrial polypectomy: 21 Endometrial curetting showed endometrial epithelium with proliferative features. Fragments of endometrial poly and proliferative endometrium\par Colonoscopy: none\par Genetic testin21 [facility] Breast cancer stat panel of 7 genes negative for pathogenic variants\par

## 2022-05-26 NOTE — PHYSICAL EXAM
[Fully active, able to carry on all pre-disease performance without restriction] : Status 0 - Fully active, able to carry on all pre-disease performance without restriction [Normal] : affect appropriate [de-identified] : s/p right mastectomy with well healed incision and expander in place

## 2022-05-27 DIAGNOSIS — R11.2 NAUSEA WITH VOMITING, UNSPECIFIED: ICD-10-CM

## 2022-05-27 DIAGNOSIS — Z51.11 ENCOUNTER FOR ANTINEOPLASTIC CHEMOTHERAPY: ICD-10-CM

## 2022-06-06 ENCOUNTER — LABORATORY RESULT (OUTPATIENT)
Age: 52
End: 2022-06-06

## 2022-06-06 LAB
ALBUMIN SERPL ELPH-MCNC: 4.7 G/DL
ALP BLD-CCNC: 98 U/L
ALT SERPL-CCNC: 25 U/L
ANION GAP SERPL CALC-SCNC: 12 MMOL/L
AST SERPL-CCNC: 23 U/L
BILIRUB SERPL-MCNC: 0.2 MG/DL
BUN SERPL-MCNC: 12 MG/DL
CALCIUM SERPL-MCNC: 9.1 MG/DL
CHLORIDE SERPL-SCNC: 109 MMOL/L
CO2 SERPL-SCNC: 24 MMOL/L
CREAT SERPL-MCNC: 0.58 MG/DL
EGFR: 111 ML/MIN/1.73M2
GLUCOSE SERPL-MCNC: 102 MG/DL
POTASSIUM SERPL-SCNC: 3.4 MMOL/L
PROT SERPL-MCNC: 6.5 G/DL
SODIUM SERPL-SCNC: 144 MMOL/L

## 2022-06-09 ENCOUNTER — APPOINTMENT (OUTPATIENT)
Dept: HEMATOLOGY ONCOLOGY | Facility: CLINIC | Age: 52
End: 2022-06-09
Payer: COMMERCIAL

## 2022-06-09 ENCOUNTER — NON-APPOINTMENT (OUTPATIENT)
Age: 52
End: 2022-06-09

## 2022-06-09 ENCOUNTER — APPOINTMENT (OUTPATIENT)
Dept: INFUSION THERAPY | Facility: HOSPITAL | Age: 52
End: 2022-06-09

## 2022-06-09 VITALS
BODY MASS INDEX: 23.55 KG/M2 | WEIGHT: 129.63 LBS | OXYGEN SATURATION: 99 % | HEIGHT: 62.01 IN | HEART RATE: 79 BPM | TEMPERATURE: 97.7 F | SYSTOLIC BLOOD PRESSURE: 107 MMHG | DIASTOLIC BLOOD PRESSURE: 74 MMHG | RESPIRATION RATE: 17 BRPM

## 2022-06-09 PROCEDURE — 99214 OFFICE O/P EST MOD 30 MIN: CPT

## 2022-06-09 NOTE — HISTORY OF PRESENT ILLNESS
[Disease: _____________________] : Disease: [unfilled] [T: ___] : T[unfilled] [N: ___] : N[unfilled] [M: ___] : M[unfilled] [AJCC Stage: ____] : AJCC Stage: [unfilled] [de-identified] : Right triple negative breast cancer diagnosed at the age of 51.\par 07/17/21 Mammogram and breast US showed a spiculated mass in the right breast at the 12 o'clock axis 5 cm FN. \par 09/17/21 Unilateral Mammogram and breast US showed a persistent spiculated mass in the right breast about 12 o'clock axis corresponding with a 1.5 x 1.0 x 0.8 irregular hypoechoic nodule with posterior shadowing. Also noted was a 1.3 x 0.6 x 1.2 cm hypoechoic nodule questionable irregularity 10 o'clock axis right breast 2 cm FN. Multiple other small nodules bilaterally suggesting complicated and simple cysts. Bilateral mild duct ectasia retroareolar region. BI-RADS 5.\par 10/19/21 Right breast 12 o'clock axis 3 cm FN core biopsy: ~0.9 cm invasive mammary carcinoma with apocrine features, moderately differentiated ER<1%, CT 0% and HER-2 (-)\par   Right breast 10 o'clock axis 2 cm FN core biopsy: ~0.7 cm Invasive mammary carcinoma, moderately differentiated. 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   Lymphovascular invasion is identified. The invasive and in situ mammary carcinoma shows a ductal phenotype but diminished/loss of membranous staining. \par 11/11/21 [facility] Breast cancer stat panel of 7 genes negative for pathogenic variants.\par 11/15/21 MRI Breast showed right upper central/slightly inner dominant enhancing mass measuring up to 3.3 cm and containing a biopsy clip, corresponding to one site of biopsy-proven malignancy. There is a 1.5 cm smaller irregular enhancing mass just inferior and lateral to the dominant aforementioned mass, corresponding to a second site of biopsy-proven malignancy. Small enhancing foci and non mass enhancement surrounding both masses, concerning for small satellite lesions and additional disease. There is 2.2 cm thick linear non mass enhancement more inferiorly in the right lateral 9:00 retroareolar location which extends to within 1.1 cm of the nipple. No MRI evidence of malignancy in the contralateral left breast. No lymphadenopathy. A 1.2 cm nonspecific T2 bright right hepatic lobe lesion which is incompletely visualized. Suggest dedicated liver MRI.\par 11/15/21 CT scan of C/A/P showed no imaging evidence of metastatic disease involving the chest, abdomen or pelvis. Two small cysts were noted in the right lobe of the liver but no suspicious lesions. Diffuse bladder wall thickening may indicate cystitis. \par 01/21/22 Right mastectomy and ALND by Dr. Cohen revealed a 2.1 cm IDC, SBR 6/9. Also noted was a 1.7 cm focus of DCIS, cribriform and solid types, intermediate nuclear grade with focal necrosis. Extensive lymphovascular invasion. 1/1 SLN positive with a 1.7 cm focus of disease. Right axillary lymph node dissection revealed 15/18 positive LN.\par She had immediate reconstruction with expander placement by Dr. Blane Ingram.\par 3/3/22 - 6/9/22 Adjuvant chemotherapy with dose dense Adriamycin (60 mg/m2) & Cytoxan (600 mg/m2) with Neulasta x 4 cycles followed by Taxol (175 mg/m2) x 4 cycles, all given every 2 weeks\par  [de-identified] : 2.1 cm IDC, SBR 6/9 with 16/19 LN positive, no extranodal extension, triple negative [de-identified] : Idalmis is accompanied by her spouse for an evaluation and for Taxol cycle # 4.\par She overall tolerated the last treatment well aside from experiencing some fatigue for the first few days post chemotherapy. \par She also has been having intermittent numbness and tingling in her thumbs and with the last cycle had more myalgias in her thighs. The muscle pain resolved by today.\par She denies heartburn, nausea, diarrhea or constipation.\par She has had anxiety for many years and is on olanzapine for this with Psychiatrist Dr Navin Canales. Her psychiatrist started her on Klonopin 0.5 mg BID,  and  venlafaxine 37.5 mg in AM and increased Zyprexa from 5 to 10, which has been helping her relax and sleep at nights. \par She is  with one son age 9. She works for [employer] in [employer], working remotely. They are going on vacation to [place] next week.\par \par HEALTH MAINTENANCE:\par PCP: Addis Peres MD\par GYN: Royce Goldman MD\par Mammogram: 21\par Pap smear: 21\par Endometrial polypectomy: 21 Endometrial curetting showed endometrial epithelium with proliferative features. Fragments of endometrial poly and proliferative endometrium\par Colonoscopy: none\par Genetic testin21 [facility] Breast cancer stat panel of 7 genes negative for pathogenic variants\par

## 2022-06-09 NOTE — PHYSICAL EXAM
[Fully active, able to carry on all pre-disease performance without restriction] : Status 0 - Fully active, able to carry on all pre-disease performance without restriction [Normal] : affect appropriate [de-identified] : s/p right mastectomy with well healed incision and expander in place

## 2022-06-10 LAB
BASOPHILS # BLD AUTO: 0.02 K/UL
BASOPHILS NFR BLD AUTO: 0.4 %
EOSINOPHIL # BLD AUTO: 0.14 K/UL
EOSINOPHIL NFR BLD AUTO: 2.8 %
HCT VFR BLD CALC: 30 %
HGB BLD-MCNC: 9.4 G/DL
IMM GRANULOCYTES NFR BLD AUTO: 0.4 %
LYMPHOCYTES # BLD AUTO: 0.37 K/UL
LYMPHOCYTES NFR BLD AUTO: 7.3 %
MAN DIFF?: NORMAL
MCHC RBC-ENTMCNC: 31.3 GM/DL
MCHC RBC-ENTMCNC: 32 PG
MCV RBC AUTO: 102 FL
MONOCYTES # BLD AUTO: 0.34 K/UL
MONOCYTES NFR BLD AUTO: 6.7 %
NEUTROPHILS # BLD AUTO: 4.19 K/UL
NEUTROPHILS NFR BLD AUTO: 82.4 %
PLATELET # BLD AUTO: 295 K/UL
RBC # BLD: 2.94 M/UL
RBC # FLD: 14.9 %
WBC # FLD AUTO: 5.08 K/UL

## 2022-07-05 PROBLEM — Z92.21 HISTORY OF CHEMOTHERAPY: Status: RESOLVED | Noted: 2022-07-05 | Resolved: 2022-07-05

## 2022-07-08 ENCOUNTER — APPOINTMENT (OUTPATIENT)
Dept: RADIATION ONCOLOGY | Facility: CLINIC | Age: 52
End: 2022-07-08

## 2022-07-08 ENCOUNTER — OUTPATIENT (OUTPATIENT)
Dept: OUTPATIENT SERVICES | Facility: HOSPITAL | Age: 52
LOS: 1 days | Discharge: HOME | End: 2022-07-08

## 2022-07-08 ENCOUNTER — NON-APPOINTMENT (OUTPATIENT)
Age: 52
End: 2022-07-08

## 2022-07-08 VITALS
OXYGEN SATURATION: 99 % | HEART RATE: 71 BPM | WEIGHT: 126 LBS | RESPIRATION RATE: 16 BRPM | TEMPERATURE: 98 F | SYSTOLIC BLOOD PRESSURE: 97 MMHG | DIASTOLIC BLOOD PRESSURE: 67 MMHG | HEIGHT: 62 IN | BODY MASS INDEX: 23.19 KG/M2

## 2022-07-08 DIAGNOSIS — Z80.3 FAMILY HISTORY OF MALIGNANT NEOPLASM OF BREAST: ICD-10-CM

## 2022-07-08 DIAGNOSIS — Z92.21 PERSONAL HISTORY OF ANTINEOPLASTIC CHEMOTHERAPY: ICD-10-CM

## 2022-07-08 DIAGNOSIS — Z90.11 ACQUIRED ABSENCE OF RIGHT BREAST AND NIPPLE: Chronic | ICD-10-CM

## 2022-07-08 DIAGNOSIS — Z92.29 PERSONAL HISTORY OF OTHER DRUG THERAPY: ICD-10-CM

## 2022-07-08 DIAGNOSIS — Z98.890 OTHER SPECIFIED POSTPROCEDURAL STATES: Chronic | ICD-10-CM

## 2022-07-08 DIAGNOSIS — U07.1 COVID-19: ICD-10-CM

## 2022-07-08 DIAGNOSIS — Z98.891 HISTORY OF UTERINE SCAR FROM PREVIOUS SURGERY: Chronic | ICD-10-CM

## 2022-07-08 PROCEDURE — 99204 OFFICE O/P NEW MOD 45 MIN: CPT | Mod: 25

## 2022-07-08 PROCEDURE — 77263 THER RADIOLOGY TX PLNG CPLX: CPT

## 2022-07-08 RX ORDER — CLONAZEPAM 0.5 MG/1
0.5 TABLET ORAL
Qty: 60 | Refills: 0 | Status: DISCONTINUED | COMMUNITY
Start: 2022-02-04 | End: 2022-07-08

## 2022-07-08 RX ORDER — METOCLOPRAMIDE 10 MG/1
10 TABLET ORAL 4 TIMES DAILY
Qty: 30 | Refills: 0 | Status: DISCONTINUED | COMMUNITY
Start: 2022-03-02 | End: 2022-07-08

## 2022-07-08 RX ORDER — LIDOCAINE HYDROCHLORIDE 20 MG/ML
2 SOLUTION ORAL; TOPICAL
Qty: 1 | Refills: 0 | Status: DISCONTINUED | COMMUNITY
Start: 2022-03-31 | End: 2022-07-08

## 2022-07-08 RX ORDER — MULTIVIT/FOLIC ACID/HERBAL 223 400 MCG
TABLET ORAL
Refills: 0 | Status: DISCONTINUED | COMMUNITY
End: 2022-07-08

## 2022-07-08 NOTE — OB/GYN HISTORY
[Approximately ___ (Month)] : the LMP was approximately [unfilled] month(s) ago [___] : Living: [unfilled] [History of Hormone Replacement Therapy] : no history of hormone replacement therapy

## 2022-07-08 NOTE — REVIEW OF SYSTEMS
[Patient Intake Form Reviewed] : Patient intake form was reviewed [Fatigue] : fatigue [Anxiety] : anxiety [Negative] : Heme/Lymph [de-identified] : surgical scar healed well on Right breast, expander in place  [de-identified] : follows with Psychiatrist

## 2022-07-08 NOTE — PHYSICAL EXAM
[No UE Edema] : there is no upper extremity edema [Normal] : oriented to person, place and time, the affect was normal, the mood was normal and not anxious [de-identified] : right mastectomy with expander reconstruction, healed well

## 2022-07-08 NOTE — VITALS
[Maximal Pain Intensity: 0/10] : 0/10 [Least Pain Intensity: 0/10] : 0/10 [ECOG Performance Status: 1 - Restricted in physically strenuous activity but ambulatory and able to carry out work of a light or sedentary nature] : Performance Status: 1 - Restricted in physically strenuous activity but ambulatory and able to carry out work of a light or sedentary nature, e.g., light house work, office work [Date: ____________] : Patient's last distress assessment performed on [unfilled]. [10 - Extreme Distress] : Distress Level: 10 [Referred Patient  to social work for follow-up] : Patient was referred to social work for follow-up [90: Able to carry normal activity; minor signs or symptoms of disease.] : 90: Able to carry normal activity; minor signs or symptoms of disease.

## 2022-07-08 NOTE — HISTORY OF PRESENT ILLNESS
[FreeTextEntry1] : Ms. Pisano is a 51 year old female being seen in consultation for radiation therapy.  She is accompanied by her  for today's visit.  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 Extensive lymphovascular invasion seen.  Margins negative.  Metastatic carcinoma to 15/18 lymph nodes (1.9 cm).   ER - (<1%)  [place] - (0%) HER2 - \par [facility] Breast Cancer 7 gene panel negative \par \par HPI:\par 7/17/21 - Bilateral Screening Mammogram: Spiculated mass identified in the Right Breast at the 12 o'clock position 5 cm from nipple.  Spot compression views and targeted sonography recommended.  Biopsy recommended.  The density of the breast parenchyma limits the sensitivity of mammography. Bilateral breast sonography recommended for complete evaluation.  BIRADS 0 \par \par 9/17/21 - Callback Unilateral Right Mammogram and Bilateral US:  Persistent spiculated like mass in the right breast at about 12:00 corresponding 1.5 x 1.0 x 0.8 cm irregular hypoechoic nodule with posterior shadowing.  1.3 x 0.6 x 1.2 cm hypoechoic nodule questionable irregularity 10:00 right breast 2 cm from nipple.  Multiple other small nodules bilaterally suggesting complicated and simple cyst. BIlateral mild ductal ectasia retroareolar region.  BIRADS 5 \par \par 10/19/21 - underwent US guided core biopsies of RIGHT Breast:\par - RIGHT Breast 12:00, 3 cm FN:  Invasive Mammary carcinoma with apocrine features, moderately differentiated (0.9 cm).  Alex score 6/9. 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 Microcalcifications are absent and Lymphovascular permeation is not seen.  ER - (<1%)  [place] - (0%) HER2 - \par - RIGHT Breast 10:00, 2 cm FN:  Invasive Mammary Carcinoma with apocrine features, moderately differentiated (0.7 cm). [place] 6/9. 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 Microcalcifications are absent.  Lymphovascular permeation by tumor is seen.  \par *Invasive and in situ mammary carcinoma shows a ductal phenotype but diminished/loss of membranous staining for E-cadherin.  Final classification (ductal versus lobular) should be performed following resection. \par \par 11/10/21 - Breast MRI: Right upper central/slightly inner dominant enhancing mass measuring up to 3.3 cm and containing a biopsy clip, corresponding to one site of biopsy-proven malignancy. There is a 1.5 cm smaller irregular enhancing mass just inferior and lateral to the dominant aforementioned mass, corresponding to a second site of biopsy-proven malignancy. Small enhancing foci and nonmass enhancement surrounding both masses, concerning for small satellite lesions and additional disease. Surgical/oncologic management is recommended   There is 2.2 cm thick linear nonmass enhancement more inferiorly in the right lateral 9:00 retroareolar location which extends to within 1.1 cm of the nipple. If this would alter patient management, MRI guided biopsy is recommended.\par No MRI evidence of malignancy in the contralateral left breast.  No lymphadenopathy.\par 1.2 cm nonspecific T2 bright right hepatic lobe lesion which is incompletely visualized. Suggest further evaluation with dedicated liver MRI.\par \par 11/15/21 - CT C/A/P:  No imaging evidence of metastatic disease involving the chest, abdomen or pelvis.  Diffuse bladder wall thickening may indicate cystitis. Consider urinalysis.  Also:  Two small cysts within the right lobe. Couple additional subcentimeter left lobe hypodensities too small for definitive characterization by CT however also probably represent cysts.\par \par 1/21/22 - underwent RIGHT total mastectomy with right axillary SLNB, right axillary lymph node dissection with Dr. Car (surgery) and with reconstruction and tissue expander placement performed by Dr. SUZANNE Ingram (plastics).  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 Extensive lymphovascular invasion seen.  Margins negative.  Metastatic carcinoma to 15/18 lymph nodes (1.9 cm).\par \par Chemotherapy: \par 3/2/22 - 4/14/22: received Adjuvant chemotherapy AC every 2 weeks with Dr. DIALLO Nunez (oncology)\par 4/28/22 - 6/9/22:  received Taxol every 2 weeks with Dr. DIALLO Nunez (oncology)\par \par 6/9/22 - saw Dr. Nunez (oncology) in follow up visit ... plan is after completing chemotherapy to have radiation and then adjuvant Capecitabine due to multiple positive LN and triple negative disease.  Patient is also planning to have a prophylactic left mastectomy with bilateral SHAGGY flap reconstruction at that time. \par \par 7/8/22 - being seen in consultation to discuss adjuvant radiation therapy.  Ms. Pisano is appropriately anxious today and looks forward to the next steps in her treatment.  She is feeling well but still has fatigue since completing chemotherapy on 6/9/22.  She denies any pain.  \par Ms. Pisano is  with a 10 year old son an is currently working remote from home.  She has history of anxiety for many years and is on medication/follow with a psychiatrist.

## 2022-07-11 ENCOUNTER — NON-APPOINTMENT (OUTPATIENT)
Age: 52
End: 2022-07-11

## 2022-07-11 PROCEDURE — 77334 RADIATION TREATMENT AID(S): CPT | Mod: 26

## 2022-07-11 PROCEDURE — 77290 THER RAD SIMULAJ FIELD CPLX: CPT | Mod: 26

## 2022-07-18 PROCEDURE — 77300 RADIATION THERAPY DOSE PLAN: CPT | Mod: 26

## 2022-07-18 PROCEDURE — 77334 RADIATION TREATMENT AID(S): CPT | Mod: 26

## 2022-07-18 PROCEDURE — 77295 3-D RADIOTHERAPY PLAN: CPT | Mod: 26

## 2022-07-22 PROCEDURE — 77280 THER RAD SIMULAJ FIELD SMPL: CPT | Mod: 26

## 2022-07-25 ENCOUNTER — NON-APPOINTMENT (OUTPATIENT)
Age: 52
End: 2022-07-25

## 2022-07-25 DIAGNOSIS — Z92.3 PERSONAL HISTORY OF IRRADIATION: ICD-10-CM

## 2022-07-29 PROCEDURE — 77427 RADIATION TX MANAGEMENT X5: CPT

## 2022-08-01 ENCOUNTER — NON-APPOINTMENT (OUTPATIENT)
Age: 52
End: 2022-08-01

## 2022-08-01 NOTE — DISEASE MANAGEMENT
[Pathological] : TNM Stage: p [IIIC] : IIIC [TTNM] : 2 [NTNM] : 3 [MTNM] : 0 [de-identified] : 0684yGy [de-identified] : 4,240cGy [de-identified] : Right chest wall/ regional nodes

## 2022-08-01 NOTE — HISTORY OF PRESENT ILLNESS
[FreeTextEntry1] : Ms. Pisano is a 51 year old female being seen for on-treatment visit.  She is unaccompanied for today's visit.  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 Extensive lymphovascular invasion seen.  Margins negative.  Metastatic carcinoma to 15/18 lymph nodes (1.9 cm).   ER - (<1%)  [place] - (0%) HER2 - \par [facility] Breast Cancer 7 gene panel negative \par \par Oncologic Management:\par \par 10/19/21 - underwent US guided core biopsies of RIGHT Breast:\par - RIGHT Breast 12:00, 3 cm FN:  Invasive Mammary carcinoma with apocrine features, moderately differentiated (0.9 cm).  [place] score 6/9. 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 Microcalcifications are absent and Lymphovascular permeation is not seen.  ER - (<1%)  [place] - (0%) HER2 - \par - RIGHT Breast 10:00, 2 cm FN:  Invasive Mammary Carcinoma with apocrine features, moderately differentiated (0.7 cm). Alex 6/9. 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 Microcalcifications are absent.  Lymphovascular permeation by tumor is seen.  \par *Invasive and in situ mammary carcinoma shows a ductal phenotype but diminished/loss of membranous staining for E-cadherin.  Final classification (ductal versus lobular) should be performed following resection. \par \par 11/10/21 - Breast MRI: Right upper central/slightly inner dominant enhancing mass measuring up to 3.3 cm and containing a biopsy clip, corresponding to one site of biopsy-proven malignancy. There is a 1.5 cm smaller irregular enhancing mass just inferior and lateral to the dominant aforementioned mass, corresponding to a second site of biopsy-proven malignancy. Small enhancing foci and nonmass enhancement surrounding both masses, concerning for small satellite lesions and additional disease. Surgical/oncologic management is recommended   There is 2.2 cm thick linear nonmass enhancement more inferiorly in the right lateral 9:00 retroareolar location which extends to within 1.1 cm of the nipple. If this would alter patient management, MRI guided biopsy is recommended.\par No MRI evidence of malignancy in the contralateral left breast.  No lymphadenopathy.\par 1.2 cm nonspecific T2 bright right hepatic lobe lesion which is incompletely visualized. Suggest further evaluation with dedicated liver MRI.\par \par 11/15/21 - CT C/A/P:  No imaging evidence of metastatic disease involving the chest, abdomen or pelvis.  Diffuse bladder wall thickening may indicate cystitis. Consider urinalysis.  Also:  Two small cysts within the right lobe. Couple additional subcentimeter left lobe hypodensities too small for definitive characterization by CT however also probably represent cysts.\par \par 1/21/22 - underwent RIGHT total mastectomy with right axillary SLNB, right axillary lymph node dissection with Dr. Car (surgery) and with reconstruction and tissue expander placement performed by Dr. SUZANNE Ingram (plastics).  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 Extensive lymphovascular invasion seen.  Margins negative.  Metastatic carcinoma to 15/18 lymph nodes (1.9 cm).\par \par Chemotherapy: \par 3/2/22 - 4/14/22: received Adjuvant chemotherapy AC every 2 weeks with Dr. DIALLO Nunez (oncology)\par 4/28/22 - 6/9/22:  received Taxol every 2 weeks with Dr. DIALLO Nunez (oncology)\par \par 6/9/22 - saw Dr. Nunez (oncology) in follow up visit ... plan is after completing chemotherapy to have radiation and then adjuvant Capecitabine due to multiple positive LN and triple negative disease.  Patient is also planning to have a prophylactic left mastectomy with bilateral SHAGGY flap reconstruction at that time. \par \par 7/25/22 - started on Radiation Therapy to Right Chest Wall/Regional Nodes, 4240 cGy in 16 fx planned \par \par Clinical Course: \par 7/25/22 - OTV 1/16 fx completed today. Today she feels well, no complaints.  Reinforced skin care while on radiation.  \par \par 8/1/2022 OTV 6/16 fx. Tolerating treatment. Mild hyperpigmentation to the right breast. Using Aquaphor

## 2022-08-01 NOTE — HISTORY OF PRESENT ILLNESS
[FreeTextEntry1] : Ms. Pisano is a 51 year old female being seen for on-treatment visit.  She is unaccompanied for today's visit.  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 Extensive lymphovascular invasion seen.  Margins negative.  Metastatic carcinoma to 15/18 lymph nodes (1.9 cm).   ER - (<1%)  [place] - (0%) HER2 - \par [facility] Breast Cancer 7 gene panel negative \par \par Oncologic Management:\par \par 10/19/21 - underwent US guided core biopsies of RIGHT Breast:\par - RIGHT Breast 12:00, 3 cm FN:  Invasive Mammary carcinoma with apocrine features, moderately differentiated (0.9 cm).  [place] score 6/9. 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 Microcalcifications are absent and Lymphovascular permeation is not seen.  ER - (<1%)  [place] - (0%) HER2 - \par - RIGHT Breast 10:00, 2 cm FN:  Invasive Mammary Carcinoma with apocrine features, moderately differentiated (0.7 cm). Alex 6/9. 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 Microcalcifications are absent.  Lymphovascular permeation by tumor is seen.  \par *Invasive and in situ mammary carcinoma shows a ductal phenotype but diminished/loss of membranous staining for E-cadherin.  Final classification (ductal versus lobular) should be performed following resection. \par \par 11/10/21 - Breast MRI: Right upper central/slightly inner dominant enhancing mass measuring up to 3.3 cm and containing a biopsy clip, corresponding to one site of biopsy-proven malignancy. There is a 1.5 cm smaller irregular enhancing mass just inferior and lateral to the dominant aforementioned mass, corresponding to a second site of biopsy-proven malignancy. Small enhancing foci and nonmass enhancement surrounding both masses, concerning for small satellite lesions and additional disease. Surgical/oncologic management is recommended   There is 2.2 cm thick linear nonmass enhancement more inferiorly in the right lateral 9:00 retroareolar location which extends to within 1.1 cm of the nipple. If this would alter patient management, MRI guided biopsy is recommended.\par No MRI evidence of malignancy in the contralateral left breast.  No lymphadenopathy.\par 1.2 cm nonspecific T2 bright right hepatic lobe lesion which is incompletely visualized. Suggest further evaluation with dedicated liver MRI.\par \par 11/15/21 - CT C/A/P:  No imaging evidence of metastatic disease involving the chest, abdomen or pelvis.  Diffuse bladder wall thickening may indicate cystitis. Consider urinalysis.  Also:  Two small cysts within the right lobe. Couple additional subcentimeter left lobe hypodensities too small for definitive characterization by CT however also probably represent cysts.\par \par 1/21/22 - underwent RIGHT total mastectomy with right axillary SLNB, right axillary lymph node dissection with Dr. Car (surgery) and with reconstruction and tissue expander placement performed by Dr. SUZANNE Ingram (plastics).  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 Extensive lymphovascular invasion seen.  Margins negative.  Metastatic carcinoma to 15/18 lymph nodes (1.9 cm).\par \par Chemotherapy: \par 3/2/22 - 4/14/22: received Adjuvant chemotherapy AC every 2 weeks with Dr. DIALLO Nunez (oncology)\par 4/28/22 - 6/9/22:  received Taxol every 2 weeks with Dr. DIALLO Nunez (oncology)\par \par 6/9/22 - saw Dr. Nunez (oncology) in follow up visit ... plan is after completing chemotherapy to have radiation and then adjuvant Capecitabine due to multiple positive LN and triple negative disease.  Patient is also planning to have a prophylactic left mastectomy with bilateral SHAGGY flap reconstruction at that time. \par \par 7/25/22 - started on Radiation Therapy to Right Chest Wall/Regional Nodes, 4240 cGy in 16 fx planned \par \par Clinical Course: \par 7/25/22 - OTV 1/16 fx completed today. Today she feels well, no complaints.  Reinforced skin care while on radiation.

## 2022-08-01 NOTE — REVIEW OF SYSTEMS
[Edema Limbs: Grade 0] : Edema Limbs: Grade 0  [Fatigue: Grade 0] : Fatigue: Grade 0 [Localized Edema: Grade 0] : Localized Edema: Grade 0  [Breast Pain: Grade 0] : Breast Pain: Grade 0 [Pruritus: Grade 0] : Pruritus: Grade 0 [Skin Atrophy: Grade 0] : Skin Atrophy: Grade 0 [Skin Hyperpigmentation: Grade 0] : Skin Hyperpigmentation: Grade 0 [Skin Induration: Grade 0] : Skin Induration: Grade 0 [Dermatitis Radiation: Grade 0] : Dermatitis Radiation: Grade 0 [FreeTextEntry6] : reinforced twice daily moisturizing of skin, using Eucerin

## 2022-08-01 NOTE — DISEASE MANAGEMENT
[Pathological] : TNM Stage: p [IIIC] : IIIC [TTNM] : 2 [NTNM] : 3 [MTNM] : 0 [de-identified] : 577yGy [de-identified] : 4,240cGy [de-identified] : Right chest wall/ regional nodes

## 2022-08-01 NOTE — REVIEW OF SYSTEMS
[Edema Limbs: Grade 0] : Edema Limbs: Grade 0  [Fatigue: Grade 0] : Fatigue: Grade 0 [Localized Edema: Grade 0] : Localized Edema: Grade 0  [Breast Pain: Grade 0] : Breast Pain: Grade 0 [Pruritus: Grade 0] : Pruritus: Grade 0 [Skin Atrophy: Grade 0] : Skin Atrophy: Grade 0 [Skin Hyperpigmentation: Grade 1 - Hyperpigmentation covering <10% BSA; no psychosocial impact] : Skin Hyperpigmentation: Grade 1 - Hyperpigmentation covering <10% BSA; no psychosocial impact [Skin Induration: Grade 0] : Skin Induration: Grade 0 [Dermatitis Radiation: Grade 1 - Faint erythema or dry desquamation] : Dermatitis Radiation: Grade 1 - Faint erythema or dry desquamation

## 2022-08-05 PROCEDURE — 77427 RADIATION TX MANAGEMENT X5: CPT

## 2022-08-08 ENCOUNTER — NON-APPOINTMENT (OUTPATIENT)
Age: 52
End: 2022-08-08

## 2022-08-09 NOTE — HISTORY OF PRESENT ILLNESS
[FreeTextEntry1] : Ms. Pisano is a 51 year old female being seen for on-treatment visit.  She is unaccompanied for today's visit.  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 Extensive lymphovascular invasion seen.  Margins negative.  Metastatic carcinoma to 15/18 lymph nodes (1.9 cm).   ER - (<1%)  SC - (0%) HER2 - \par [facility] Breast Cancer 7 gene panel negative \par \par Oncologic Management:\par \par 10/19/21 - underwent US guided core biopsies of RIGHT Breast:\par - RIGHT Breast 12:00, 3 cm FN:  Invasive Mammary carcinoma with apocrine features, moderately differentiated (0.9 cm).  [place] score 6/9. 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 Microcalcifications are absent and Lymphovascular permeation is not seen.  ER - (<1%)  SC - (0%) HER2 - \par - RIGHT Breast 10:00, 2 cm FN:  Invasive Mammary Carcinoma with apocrine features, moderately differentiated (0.7 cm). Alex 6/9. 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 Microcalcifications are absent.  Lymphovascular permeation by tumor is seen.  \par *Invasive and in situ mammary carcinoma shows a ductal phenotype but diminished/loss of membranous staining for E-cadherin.  Final classification (ductal versus lobular) should be performed following resection. \par \par 11/10/21 - Breast MRI: Right upper central/slightly inner dominant enhancing mass measuring up to 3.3 cm and containing a biopsy clip, corresponding to one site of biopsy-proven malignancy. There is a 1.5 cm smaller irregular enhancing mass just inferior and lateral to the dominant aforementioned mass, corresponding to a second site of biopsy-proven malignancy. Small enhancing foci and nonmass enhancement surrounding both masses, concerning for small satellite lesions and additional disease. Surgical/oncologic management is recommended   There is 2.2 cm thick linear nonmass enhancement more inferiorly in the right lateral 9:00 retroareolar location which extends to within 1.1 cm of the nipple. If this would alter patient management, MRI guided biopsy is recommended.\par No MRI evidence of malignancy in the contralateral left breast.  No lymphadenopathy.\par 1.2 cm nonspecific T2 bright right hepatic lobe lesion which is incompletely visualized. Suggest further evaluation with dedicated liver MRI.\par \par 11/15/21 - CT C/A/P:  No imaging evidence of metastatic disease involving the chest, abdomen or pelvis.  Diffuse bladder wall thickening may indicate cystitis. Consider urinalysis.  Also:  Two small cysts within the right lobe. Couple additional subcentimeter left lobe hypodensities too small for definitive characterization by CT however also probably represent cysts.\par \par 1/21/22 - underwent RIGHT total mastectomy with right axillary SLNB, right axillary lymph node dissection with Dr. Car (surgery) and with reconstruction and tissue expander placement performed by Dr. SUZANNE Ingram (plastics).  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 Extensive lymphovascular invasion seen.  Margins negative.  Metastatic carcinoma to 15/18 lymph nodes (1.9 cm).\par \par Chemotherapy: \par 3/2/22 - 4/14/22: received Adjuvant chemotherapy AC every 2 weeks with Dr. DIALLO Nunez (oncology)\par 4/28/22 - 6/9/22:  received Taxol every 2 weeks with Dr. DIALLO Nunez (oncology)\par \par 6/9/22 - saw Dr. Nunez (oncology) in follow up visit ... plan is after completing chemotherapy to have radiation and then adjuvant Capecitabine due to multiple positive LN and triple negative disease.  Patient is also planning to have a prophylactic left mastectomy with bilateral SHAGGY flap reconstruction at that time. \par \par 7/25/22 - started on Radiation Therapy to Right Chest Wall/Regional Nodes, 4240 cGy in 16 fx planned \par \par Clinical Course: \par 7/25/22 - OTV 1/16 fx completed today. Today she feels well, no complaints.  Reinforced skin care while on radiation.  \par \par 8/1/2022 OTV 6/16 fx. Tolerating treatment. Mild hyperpigmentation to the right breast. Using Aquaphor\par \par 8/8/22 - OTV 11/16 fx completed. Ms. Pisano is feeling well, has some increase in tiredness, denies pain.  Doing skin care, skin with mild pinkness and hyperpigmentation.  \par Post treatment evaluation appointment scheduled for 9/26/22.

## 2022-08-09 NOTE — REVIEW OF SYSTEMS
[Edema Limbs: Grade 0] : Edema Limbs: Grade 0  [Localized Edema: Grade 0] : Localized Edema: Grade 0  [Breast Pain: Grade 0] : Breast Pain: Grade 0 [Pruritus: Grade 0] : Pruritus: Grade 0 [Skin Atrophy: Grade 0] : Skin Atrophy: Grade 0 [Skin Hyperpigmentation: Grade 1 - Hyperpigmentation covering <10% BSA; no psychosocial impact] : Skin Hyperpigmentation: Grade 1 - Hyperpigmentation covering <10% BSA; no psychosocial impact [Skin Induration: Grade 0] : Skin Induration: Grade 0 [Dermatitis Radiation: Grade 1 - Faint erythema or dry desquamation] : Dermatitis Radiation: Grade 1 - Faint erythema or dry desquamation [Fatigue: Grade 1 - Fatigue relieved by rest] : Fatigue: Grade 1 - Fatigue relieved by rest [FreeTextEntry4] : using Aquaphor twice daily

## 2022-08-09 NOTE — DISEASE MANAGEMENT
[Pathological] : TNM Stage: p [IIIC] : IIIC [TTNM] : 2 [NTNM] : 3 [MTNM] : 0 [de-identified] : [id number] [de-identified] : 4,240cGy [de-identified] : Right chest wall/ regional nodes

## 2022-08-12 PROCEDURE — 77427 RADIATION TX MANAGEMENT X5: CPT

## 2022-08-15 ENCOUNTER — NON-APPOINTMENT (OUTPATIENT)
Age: 52
End: 2022-08-15

## 2022-08-19 NOTE — HISTORY OF PRESENT ILLNESS
[FreeTextEntry1] : Ms. Pisano is a 51 year old female being seen for on-treatment visit.  She is unaccompanied for today's visit.  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 Extensive lymphovascular invasion seen.  Margins negative.  Metastatic carcinoma to 15/18 lymph nodes (1.9 cm).   ER - (<1%)  [place] - (0%) HER2 - \par [facility] Breast Cancer 7 gene panel negative \par \par Oncologic Management:\par \par 10/19/21 - underwent US guided core biopsies of RIGHT Breast:\par - RIGHT Breast 12:00, 3 cm FN:  Invasive Mammary carcinoma with apocrine features, moderately differentiated (0.9 cm).  [place] score 6/9. 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 Microcalcifications are absent and Lymphovascular permeation is not seen.  ER - (<1%)  GA - (0%) HER2 - \par - RIGHT Breast 10:00, 2 cm FN:  Invasive Mammary Carcinoma with apocrine features, moderately differentiated (0.7 cm). Alex 6/9. 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 Microcalcifications are absent.  Lymphovascular permeation by tumor is seen.  \par *Invasive and in situ mammary carcinoma shows a ductal phenotype but diminished/loss of membranous staining for E-cadherin.  Final classification (ductal versus lobular) should be performed following resection. \par \par 11/10/21 - Breast MRI: Right upper central/slightly inner dominant enhancing mass measuring up to 3.3 cm and containing a biopsy clip, corresponding to one site of biopsy-proven malignancy. There is a 1.5 cm smaller irregular enhancing mass just inferior and lateral to the dominant aforementioned mass, corresponding to a second site of biopsy-proven malignancy. Small enhancing foci and nonmass enhancement surrounding both masses, concerning for small satellite lesions and additional disease. Surgical/oncologic management is recommended   There is 2.2 cm thick linear nonmass enhancement more inferiorly in the right lateral 9:00 retroareolar location which extends to within 1.1 cm of the nipple. If this would alter patient management, MRI guided biopsy is recommended.\par No MRI evidence of malignancy in the contralateral left breast.  No lymphadenopathy.\par 1.2 cm nonspecific T2 bright right hepatic lobe lesion which is incompletely visualized. Suggest further evaluation with dedicated liver MRI.\par \par 11/15/21 - CT C/A/P:  No imaging evidence of metastatic disease involving the chest, abdomen or pelvis.  Diffuse bladder wall thickening may indicate cystitis. Consider urinalysis.  Also:  Two small cysts within the right lobe. Couple additional subcentimeter left lobe hypodensities too small for definitive characterization by CT however also probably represent cysts.\par \par 1/21/22 - underwent RIGHT total mastectomy with right axillary SLNB, right axillary lymph node dissection with Dr. Car (surgery) and with reconstruction and tissue expander placement performed by Dr. SUZANNE Ingram (plastics).  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 Extensive lymphovascular invasion seen.  Margins negative.  Metastatic carcinoma to 15/18 lymph nodes (1.9 cm).\par \par Chemotherapy: \par 3/2/22 - 4/14/22: received Adjuvant chemotherapy AC every 2 weeks with Dr. DIALLO Nunez (oncology)\par 4/28/22 - 6/9/22:  received Taxol every 2 weeks with Dr. DIALLO Nunez (oncology)\par \par 6/9/22 - saw Dr. Nunez (oncology) in follow up visit ... plan is after completing chemotherapy to have radiation and then adjuvant Capecitabine due to multiple positive LN and triple negative disease.  Patient is also planning to have a prophylactic left mastectomy with bilateral SHAGGY flap reconstruction at that time. \par \par 7/25/22 - started on Radiation Therapy to Right Chest Wall/Regional Nodes, 4240 cGy in 16 fx planned \par \par Clinical Course: \par 7/25/22 - OTV 1/16 fx completed today. Today she feels well, no complaints.  Reinforced skin care while on radiation.  \par \par 8/1/2022 OTV 6/16 fx. Tolerating treatment. Mild hyperpigmentation to the right breast. Using Aquaphor\par \par 8/8/22 - OTV 11/16 fx completed. Ms. Pisano is feeling well, has some increase in tiredness, denies pain.  Doing skin care, skin with mild pinkness and hyperpigmentation.  \par Post treatment evaluation appointment scheduled for 9/26/22.\par \par 8/15/22 - 16/16 fx completed today.  Ms. Pisano continues to do well, denies any pain.  Remains with some mild hyperpigmentation and pinkness of the skin.  She is moisturizing twice a day.  Discharge instructions and folder given and reviewed with patient.  All questions answered.  She knows to continue skin care and to call with any questions or concerns.  Post treatment evaluation appointment scheduled for 9/26/22.\par

## 2022-08-19 NOTE — DISEASE MANAGEMENT
[Pathological] : TNM Stage: p [IIIC] : IIIC [TTNM] : 2 [NTNM] : 3 [MTNM] : 0 [de-identified] : 4,240cGy [de-identified] : 4,240cGy [de-identified] : Right chest wall/ regional nodes

## 2022-08-19 NOTE — REVIEW OF SYSTEMS
[Edema Limbs: Grade 0] : Edema Limbs: Grade 0  [Fatigue: Grade 1 - Fatigue relieved by rest] : Fatigue: Grade 1 - Fatigue relieved by rest [Localized Edema: Grade 0] : Localized Edema: Grade 0  [Breast Pain: Grade 0] : Breast Pain: Grade 0 [Pruritus: Grade 0] : Pruritus: Grade 0 [Skin Atrophy: Grade 0] : Skin Atrophy: Grade 0 [Skin Hyperpigmentation: Grade 1 - Hyperpigmentation covering <10% BSA; no psychosocial impact] : Skin Hyperpigmentation: Grade 1 - Hyperpigmentation covering <10% BSA; no psychosocial impact [Skin Induration: Grade 0] : Skin Induration: Grade 0 [Dermatitis Radiation: Grade 1 - Faint erythema or dry desquamation] : Dermatitis Radiation: Grade 1 - Faint erythema or dry desquamation [FreeTextEntry4] : using Aquaphor twice daily

## 2022-08-27 ENCOUNTER — OUTPATIENT (OUTPATIENT)
Dept: OUTPATIENT SERVICES | Facility: HOSPITAL | Age: 52
LOS: 1 days | Discharge: HOME | End: 2022-08-27

## 2022-08-27 DIAGNOSIS — Z98.890 OTHER SPECIFIED POSTPROCEDURAL STATES: Chronic | ICD-10-CM

## 2022-08-27 DIAGNOSIS — Z90.11 ACQUIRED ABSENCE OF RIGHT BREAST AND NIPPLE: Chronic | ICD-10-CM

## 2022-08-27 DIAGNOSIS — Z98.891 HISTORY OF UTERINE SCAR FROM PREVIOUS SURGERY: Chronic | ICD-10-CM

## 2022-08-27 DIAGNOSIS — C50.919 MALIGNANT NEOPLASM OF UNSPECIFIED SITE OF UNSPECIFIED FEMALE BREAST: ICD-10-CM

## 2022-08-29 ENCOUNTER — NON-APPOINTMENT (OUTPATIENT)
Age: 52
End: 2022-08-29

## 2022-08-30 ENCOUNTER — APPOINTMENT (OUTPATIENT)
Dept: HEMATOLOGY ONCOLOGY | Facility: CLINIC | Age: 52
End: 2022-08-30

## 2022-09-07 ENCOUNTER — APPOINTMENT (OUTPATIENT)
Dept: HEMATOLOGY ONCOLOGY | Facility: CLINIC | Age: 52
End: 2022-09-07

## 2022-09-07 VITALS
WEIGHT: 132.28 LBS | HEART RATE: 84 BPM | BODY MASS INDEX: 24.19 KG/M2 | SYSTOLIC BLOOD PRESSURE: 119 MMHG | DIASTOLIC BLOOD PRESSURE: 80 MMHG | RESPIRATION RATE: 16 BRPM | OXYGEN SATURATION: 99 % | TEMPERATURE: 97.1 F

## 2022-09-07 PROCEDURE — 99215 OFFICE O/P EST HI 40 MIN: CPT

## 2022-09-07 NOTE — HISTORY OF PRESENT ILLNESS
[Disease: _____________________] : Disease: [unfilled] [T: ___] : T[unfilled] [N: ___] : N[unfilled] [M: ___] : M[unfilled] [AJCC Stage: ____] : AJCC Stage: [unfilled] [de-identified] : Right triple negative breast cancer diagnosed at the age of 51.\par 07/17/21 Mammogram and breast US showed a spiculated mass in the right breast at the 12 o'clock axis 5 cm FN. \par 09/17/21 Unilateral Mammogram and breast US showed a persistent spiculated mass in the right breast about 12 o'clock axis corresponding with a 1.5 x 1.0 x 0.8 irregular hypoechoic nodule with posterior shadowing. Also noted was a 1.3 x 0.6 x 1.2 cm hypoechoic nodule questionable irregularity 10 o'clock axis right breast 2 cm FN. Multiple other small nodules bilaterally suggesting complicated and simple cysts. Bilateral mild duct ectasia retroareolar region. BI-RADS 5.\par 10/19/21 Right breast 12 o'clock axis 3 cm FN core biopsy: ~0.9 cm invasive mammary carcinoma with apocrine features, moderately differentiated ER<1%, ID 0% and HER-2 (-)\par   Right breast 10 o'clock axis 2 cm FN core biopsy: ~0.7 cm Invasive mammary carcinoma, moderately differentiated. 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   Lymphovascular invasion is identified. The invasive and in situ mammary carcinoma shows a ductal phenotype but diminished/loss of membranous staining. \par 11/11/21 [facility] Breast cancer stat panel of 7 genes negative for pathogenic variants.\par 11/15/21 MRI Breast showed right upper central/slightly inner dominant enhancing mass measuring up to 3.3 cm and containing a biopsy clip, corresponding to one site of biopsy-proven malignancy. There is a 1.5 cm smaller irregular enhancing mass just inferior and lateral to the dominant aforementioned mass, corresponding to a second site of biopsy-proven malignancy. Small enhancing foci and non mass enhancement surrounding both masses, concerning for small satellite lesions and additional disease. There is 2.2 cm thick linear non mass enhancement more inferiorly in the right lateral 9:00 retroareolar location which extends to within 1.1 cm of the nipple. No MRI evidence of malignancy in the contralateral left breast. No lymphadenopathy. A 1.2 cm nonspecific T2 bright right hepatic lobe lesion which is incompletely visualized. Suggest dedicated liver MRI.\par 11/15/21 CT scan of C/A/P showed no imaging evidence of metastatic disease involving the chest, abdomen or pelvis. Two small cysts were noted in the right lobe of the liver but no suspicious lesions. Diffuse bladder wall thickening may indicate cystitis. \par 01/21/22 Right mastectomy and ALND by Dr. Cohen revealed a 2.1 cm IDC, SBR 6/9. Also noted was a 1.7 cm focus of DCIS, cribriform and solid types, intermediate nuclear grade with focal necrosis. Extensive lymphovascular invasion. 1/1 SLN positive with a 1.7 cm focus of disease. Right axillary lymph node dissection revealed 15/18 positive LN.\par She had immediate reconstruction with expander placement by Dr. Blane Ingram.\par 3/3/22 - 6/9/22 Adjuvant chemotherapy with dose dense Adriamycin (60 mg/m2) & Cytoxan (600 mg/m2) with Neulasta x 4 cycles followed by Taxol (175 mg/m2) x 4 cycles, all given every 2 weeks\par 7/24/22 - 8/15/22 Adjuvant radiation with 4240 cGy in 16 fractions with Dr. Moy\par 9/12/22 Adjuvant capecitabine 1500 mg bid 7 days on and 7 days off every 28 days with plan for 6 cycles given high risk disease with 16/19 LN positive\par  [de-identified] : 2.1 cm IDC, SBR 6/9 with 16/19 LN positive, no extranodal extension, triple negative [de-identified] : Idal[facility] completed adjuvant radiation about 4 weeks ago and comes to discuss starting capecitabine given her high risk disease.\par She still gets fatigued easily but is doing well overall.\par She will be following up with Dr. Ingram next week to discuss the implant exchange. \par She has had anxiety for many years and is on olanzapine for this with Psychiatrist Dr Navin Canales. Her psychiatrist started her on Klonopin 0.5 mg BID,  and  venlafaxine 37.5 mg in AM and increased Zyprexa from 5 to 10, which has been helping her relax and sleep at nights. \par She is  with one son age 10. She works for [employer] in [place] [employer], working remotely. Her  has been gradually withdrawing and although he is still involved with their son and takes him to school he is no longer living in the house.\par \par HEALTH MAINTENANCE:\par PCP: Addis Peres MD\par GYN: Royce Goldman MD\par Mammogram: 21\par Pap smear: 21\par Endometrial polypectomy: 21 Endometrial curetting showed endometrial epithelium with proliferative features. Fragments of endometrial poly and proliferative endometrium\par Colonoscopy: none\par Genetic testin21 [facility] Breast cancer stat panel of 7 genes negative for pathogenic variants\par

## 2022-09-26 ENCOUNTER — APPOINTMENT (OUTPATIENT)
Dept: RADIATION ONCOLOGY | Facility: CLINIC | Age: 52
End: 2022-09-26

## 2022-09-26 VITALS
SYSTOLIC BLOOD PRESSURE: 125 MMHG | HEIGHT: 62 IN | HEART RATE: 69 BPM | BODY MASS INDEX: 25.01 KG/M2 | RESPIRATION RATE: 17 BRPM | DIASTOLIC BLOOD PRESSURE: 81 MMHG | OXYGEN SATURATION: 98 % | TEMPERATURE: 97.16 F | WEIGHT: 135.91 LBS

## 2022-09-26 PROCEDURE — 99024 POSTOP FOLLOW-UP VISIT: CPT

## 2022-09-26 NOTE — PHYSICAL EXAM
[No UE Edema] : there is no upper extremity edema [Normal] : no palpable adenopathy [de-identified] : hyperpigmentation right breast (treatment area), expander in place right breast

## 2022-09-26 NOTE — HISTORY OF PRESENT ILLNESS
[FreeTextEntry1] : Ms. Pisano is a 52 year old female being seen for post treatment evaluation.  She is unaccompanied for today's visit.  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 Extensive lymphovascular invasion seen.  Margins negative.  Metastatic carcinoma to 15/18 lymph nodes (1.9 cm).   ER - (<1%)  [place] - (0%) HER2 - \par [facility] Breast Cancer 7 gene panel negative \par \par Oncologic Management:\par \par 10/19/21 - underwent US guided core biopsies of RIGHT Breast:\par - RIGHT Breast 12:00, 3 cm FN:  Invasive Mammary carcinoma with apocrine features, moderately differentiated (0.9 cm).  [place] score 6/9. 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 Microcalcifications are absent and Lymphovascular permeation is not seen.  ER - (<1%)  TX - (0%) HER2 - \par - RIGHT Breast 10:00, 2 cm FN:  Invasive Mammary Carcinoma with apocrine features, moderately differentiated (0.7 cm). Alex 6/9. 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 Microcalcifications are absent.  Lymphovascular permeation by tumor is seen.  \par *Invasive and in situ mammary carcinoma shows a ductal phenotype but diminished/loss of membranous staining for E-cadherin.  Final classification (ductal versus lobular) should be performed following resection. \par \par 11/10/21 - Breast MRI: Right upper central/slightly inner dominant enhancing mass measuring up to 3.3 cm and containing a biopsy clip, corresponding to one site of biopsy-proven malignancy. There is a 1.5 cm smaller irregular enhancing mass just inferior and lateral to the dominant aforementioned mass, corresponding to a second site of biopsy-proven malignancy. Small enhancing foci and nonmass enhancement surrounding both masses, concerning for small satellite lesions and additional disease. Surgical/oncologic management is recommended   There is 2.2 cm thick linear nonmass enhancement more inferiorly in the right lateral 9:00 retroareolar location which extends to within 1.1 cm of the nipple. If this would alter patient management, MRI guided biopsy is recommended.\par No MRI evidence of malignancy in the contralateral left breast.  No lymphadenopathy.\par 1.2 cm nonspecific T2 bright right hepatic lobe lesion which is incompletely visualized. Suggest further evaluation with dedicated liver MRI.\par \par 11/15/21 - CT C/A/P:  No imaging evidence of metastatic disease involving the chest, abdomen or pelvis.  Diffuse bladder wall thickening may indicate cystitis. Consider urinalysis.  Also:  Two small cysts within the right lobe. Couple additional subcentimeter left lobe hypodensities too small for definitive characterization by CT however also probably represent cysts.\par \par 1/21/22 - underwent RIGHT total mastectomy with right axillary SLNB, right axillary lymph node dissection with Dr. Car (surgery) and with reconstruction and tissue expander placement performed by Dr. SUZANNE Ingram (plastics).  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 Extensive lymphovascular invasion seen.  Margins negative.  Metastatic carcinoma to 15/18 lymph nodes (1.9 cm).\par \par Chemotherapy: \par 3/2/22 - 4/14/22: received Adjuvant chemotherapy AC every 2 weeks with Dr. DIALLO Nunez (oncology)\par 4/28/22 - 6/9/22:  received Taxol every 2 weeks with Dr. DIALLO Nunez (oncology)\par \par 6/9/22 - saw Dr. Nunez (oncology) in follow up visit ... plan is after completing chemotherapy to have radiation and then adjuvant Capecitabine due to multiple positive LN and triple negative disease.  Patient is also planning to have a prophylactic left mastectomy with bilateral SHAGGY flap reconstruction at that time. \par \par 7/25/22 - 8/15/2022 Received Radiation Therapy to Right Chest Wall/Regional Nodes, 4240 cGy in 16 fx \par \par 9/12/22 -  started on Capecitabine - 1 week on 1 week off, plan for 6 months (Dr. Nunez - [facility] [place])\par \par 9/26/2022 - presents for post treatment evaluation.  Ms. Pisano is feeling well, has increase in tiredness since starting on Capecitabine and is resting as needed.  Skin looks good, has some residual hyperpigmentation - continues to moisturize with Aquaphor.  She saw Dr. Ingram (plastics) last week and is to follow up in November.  \par

## 2022-09-26 NOTE — REVIEW OF SYSTEMS
[Edema Limbs: Grade 0] : Edema Limbs: Grade 0  [Fatigue: Grade 1 - Fatigue relieved by rest] : Fatigue: Grade 1 - Fatigue relieved by rest [Localized Edema: Grade 0] : Localized Edema: Grade 0  [Breast Pain: Grade 0] : Breast Pain: Grade 0 [Pruritus: Grade 0] : Pruritus: Grade 0 [Skin Atrophy: Grade 0] : Skin Atrophy: Grade 0 [Skin Hyperpigmentation: Grade 1 - Hyperpigmentation covering <10% BSA; no psychosocial impact] : Skin Hyperpigmentation: Grade 1 - Hyperpigmentation covering <10% BSA; no psychosocial impact [Skin Induration: Grade 0] : Skin Induration: Grade 0 [Dermatitis Radiation: Grade 0] : Dermatitis Radiation: Grade 0 [FreeTextEntry4] : using Aquaphor twice daily

## 2022-09-27 ENCOUNTER — APPOINTMENT (OUTPATIENT)
Dept: OBGYN | Facility: CLINIC | Age: 52
End: 2022-09-27

## 2022-09-27 VITALS
WEIGHT: 134 LBS | HEIGHT: 62 IN | BODY MASS INDEX: 24.66 KG/M2 | DIASTOLIC BLOOD PRESSURE: 77 MMHG | SYSTOLIC BLOOD PRESSURE: 112 MMHG

## 2022-09-27 PROCEDURE — 99396 PREV VISIT EST AGE 40-64: CPT

## 2022-09-27 NOTE — PHYSICAL EXAM
[Appropriately responsive] : appropriately responsive [Alert] : alert [No Acute Distress] : no acute distress [Soft] : soft [Non-tender] : non-tender [Non-distended] : non-distended [No HSM] : No HSM [No Lesions] : no lesions [No Mass] : no mass [Oriented x3] : oriented x3 [FreeTextEntry3] : supple [FreeTextEntry5] : Normal respiratory effort [] : an implant [___] : a [unfilled] ~Ucm mastectomy scar [No Masses] : no breast masses were palpable [Labia Majora] : normal [Labia Minora] : normal [Normal] : normal [Uterine Adnexae] : normal

## 2022-09-27 NOTE — HISTORY OF PRESENT ILLNESS
[FreeTextEntry1] : Patient is a 51 y/o P1 presenting for an annual visit. She states that she has not gotten a menses since she was seen with spotting one year ago. She has completed treatment for breast cancer. She is s/p chemo/RT and is on maintenance therapy currently. She is scheduled to see a plastic surgeon for a contralateral mastectomy/flap.  She denies vaginal itching, odor and discharge. Denies urinary urgency, frequency and dysuria.\par  [Patient reported PAP Smear was abnormal] : Patient reported PAP Smear was abnormal [postmenopausal] : postmenopausal [N] : Patient is not sexually active [Y] : Positive pregnancy history [PapSmeardate] : 8/28/21 [TextBox_31] : Cytology normal, HPV+ [PGxTotal] : 1 [[facility] : [facility]

## 2022-09-27 NOTE — PLAN
[FreeTextEntry1] : 51 y/o P1 presenting for annual exam\par -f/u pap with HPV cotesting\par -Breast cancer - follows closely with hemeonc/radonc\par -f/u PRN\par

## 2022-09-29 LAB — HPV HIGH+LOW RISK DNA PNL CVX: NOT DETECTED

## 2022-10-03 LAB — CYTOLOGY CVX/VAG DOC THIN PREP: ABNORMAL

## 2022-10-05 ENCOUNTER — APPOINTMENT (OUTPATIENT)
Dept: HEMATOLOGY ONCOLOGY | Facility: CLINIC | Age: 52
End: 2022-10-05

## 2022-10-05 VITALS
OXYGEN SATURATION: 99 % | WEIGHT: 132.94 LBS | HEART RATE: 125 BPM | SYSTOLIC BLOOD PRESSURE: 108 MMHG | RESPIRATION RATE: 16 BRPM | BODY MASS INDEX: 24.31 KG/M2 | DIASTOLIC BLOOD PRESSURE: 76 MMHG | TEMPERATURE: 97.3 F

## 2022-10-05 PROCEDURE — 99214 OFFICE O/P EST MOD 30 MIN: CPT

## 2022-10-05 NOTE — PHYSICAL EXAM
[Fully active, able to carry on all pre-disease performance without restriction] : Status 0 - Fully active, able to carry on all pre-disease performance without restriction [Normal] : affect appropriate [de-identified] : s/p right mastectomy with well healed incision and expander in place; no chest wall masses or lymphadenopathy

## 2022-10-05 NOTE — HISTORY OF PRESENT ILLNESS
[Disease: _____________________] : Disease: [unfilled] [T: ___] : T[unfilled] [N: ___] : N[unfilled] [M: ___] : M[unfilled] [AJCC Stage: ____] : AJCC Stage: [unfilled] [de-identified] : Right triple negative breast cancer diagnosed at the age of 51.\par 07/17/21 Mammogram and breast US showed a spiculated mass in the right breast at the 12 o'clock axis 5 cm FN. \par 09/17/21 Unilateral Mammogram and breast US showed a persistent spiculated mass in the right breast about 12 o'clock axis corresponding with a 1.5 x 1.0 x 0.8 irregular hypoechoic nodule with posterior shadowing. Also noted was a 1.3 x 0.6 x 1.2 cm hypoechoic nodule questionable irregularity 10 o'clock axis right breast 2 cm FN. Multiple other small nodules bilaterally suggesting complicated and simple cysts. Bilateral mild duct ectasia retroareolar region. BI-RADS 5.\par 10/19/21 Right breast 12 o'clock axis 3 cm FN core biopsy: ~0.9 cm invasive mammary carcinoma with apocrine features, moderately differentiated ER<1%, AR 0% and HER-2 (-)\par   Right breast 10 o'clock axis 2 cm FN core biopsy: ~0.7 cm Invasive mammary carcinoma, moderately differentiated. 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   Lymphovascular invasion is identified. The invasive and in situ mammary carcinoma shows a ductal phenotype but diminished/loss of membranous staining. \par 11/11/21 [facility] Breast cancer stat panel of 7 genes negative for pathogenic variants.\par 11/15/21 MRI Breast showed right upper central/slightly inner dominant enhancing mass measuring up to 3.3 cm and containing a biopsy clip, corresponding to one site of biopsy-proven malignancy. There is a 1.5 cm smaller irregular enhancing mass just inferior and lateral to the dominant aforementioned mass, corresponding to a second site of biopsy-proven malignancy. Small enhancing foci and non mass enhancement surrounding both masses, concerning for small satellite lesions and additional disease. There is 2.2 cm thick linear non mass enhancement more inferiorly in the right lateral 9:00 retroareolar location which extends to within 1.1 cm of the nipple. No MRI evidence of malignancy in the contralateral left breast. No lymphadenopathy. A 1.2 cm nonspecific T2 bright right hepatic lobe lesion which is incompletely visualized. Suggest dedicated liver MRI.\par 11/15/21 CT scan of C/A/P showed no imaging evidence of metastatic disease involving the chest, abdomen or pelvis. Two small cysts were noted in the right lobe of the liver but no suspicious lesions. Diffuse bladder wall thickening may indicate cystitis. \par 01/21/22 Right mastectomy and ALND by Dr. Cohen revealed a 2.1 cm IDC, SBR 6/9. Also noted was a 1.7 cm focus of DCIS, cribriform and solid types, intermediate nuclear grade with focal necrosis. Extensive lymphovascular invasion. 1/1 SLN positive with a 1.7 cm focus of disease. Right axillary lymph node dissection revealed 15/18 positive LN.\par She had immediate reconstruction with expander placement by Dr. Blane Ingram.\par 3/3/22 - 6/9/22 Adjuvant chemotherapy with dose dense Adriamycin (60 mg/m2) & Cytoxan (600 mg/m2) with Neulasta x 4 cycles followed by Taxol (175 mg/m2) x 4 cycles, all given every 2 weeks\par 7/24/22 - 8/15/22 Adjuvant radiation with 4240 cGy in 16 fractions with Dr. Moy\par 9/12/22 Adjuvant capecitabine 1500 mg bid 7 days on and 7 days off every 28 days with plan for 6 cycles given high risk disease with 16/19 LN positive\par  [de-identified] : 2.1 cm IDC, SBR 6/9 with 16/19 LN positive, no extranodal extension, triple negative [de-identified] : Idalmis started capecitabine 22 1500 mg bid one week on and one week off.\par She is tolerating the medication well with some dryness in her hands and feet but no pain.  She has had occasional diarrhea but not every day.\par She has mild fatigue but is able to work. She continues to work remotely.\par She has not had a period since before starting chemotherapy. \par She met with Dr. Ingram discuss the implant exchange and will be following up 22 to discuss timing.\par She has had anxiety for many years and is on olanzapine for this with Psychiatrist Dr Navin Canales. Her psychiatrist started her on Klonopin 0.5 mg BID,  and  venlafaxine 37.5 mg in AM and increased Zyprexa from 5 to 10, which has been helping her relax and sleep at nights. \par She is  with one son age 10. She works for [employer] in [place], working remotely. Her  has been gradually withdrawing and although he is still involved with their son and takes him to school he is no longer living in the house.\par \par HEALTH MAINTENANCE:\par PCP: Addis Peres MD\par GYN: Royec Goldman MD\par Mammogram: 21\par Pap smear: 21\par Endometrial polypectomy: 21 Endometrial curetting showed endometrial epithelium with proliferative features. Fragments of endometrial poly and proliferative endometrium\par Colonoscopy: none\par Genetic testin21 [facility] Breast cancer stat panel of 7 genes negative for pathogenic variants\par

## 2022-10-26 ENCOUNTER — OUTPATIENT (OUTPATIENT)
Dept: OUTPATIENT SERVICES | Facility: HOSPITAL | Age: 52
LOS: 1 days | Discharge: HOME | End: 2022-10-26

## 2022-10-26 DIAGNOSIS — Z98.890 OTHER SPECIFIED POSTPROCEDURAL STATES: Chronic | ICD-10-CM

## 2022-10-26 DIAGNOSIS — Z90.11 ACQUIRED ABSENCE OF RIGHT BREAST AND NIPPLE: Chronic | ICD-10-CM

## 2022-10-26 DIAGNOSIS — Z98.891 HISTORY OF UTERINE SCAR FROM PREVIOUS SURGERY: Chronic | ICD-10-CM

## 2022-10-26 DIAGNOSIS — C50.919 MALIGNANT NEOPLASM OF UNSPECIFIED SITE OF UNSPECIFIED FEMALE BREAST: ICD-10-CM

## 2022-11-02 ENCOUNTER — RESULT REVIEW (OUTPATIENT)
Age: 52
End: 2022-11-02

## 2022-11-02 ENCOUNTER — APPOINTMENT (OUTPATIENT)
Dept: HEMATOLOGY ONCOLOGY | Facility: CLINIC | Age: 52
End: 2022-11-02

## 2022-11-02 VITALS
WEIGHT: 131.18 LBS | DIASTOLIC BLOOD PRESSURE: 68 MMHG | BODY MASS INDEX: 23.99 KG/M2 | RESPIRATION RATE: 16 BRPM | HEART RATE: 88 BPM | TEMPERATURE: 97.5 F | OXYGEN SATURATION: 99 % | SYSTOLIC BLOOD PRESSURE: 102 MMHG

## 2022-11-02 LAB
ALBUMIN SERPL ELPH-MCNC: 4.7 G/DL
ALP BLD-CCNC: 114 U/L
ALT SERPL-CCNC: 28 U/L
ANION GAP SERPL CALC-SCNC: 13 MMOL/L
AST SERPL-CCNC: 30 U/L
BASOPHILS # BLD AUTO: 0 K/UL — SIGNIFICANT CHANGE UP (ref 0–0.2)
BASOPHILS NFR BLD AUTO: 0 % — SIGNIFICANT CHANGE UP (ref 0–2)
BILIRUB SERPL-MCNC: 0.4 MG/DL
BUN SERPL-MCNC: 14 MG/DL
CALCIUM SERPL-MCNC: 9.3 MG/DL
CHLORIDE SERPL-SCNC: 109 MMOL/L
CO2 SERPL-SCNC: 22 MMOL/L
CREAT SERPL-MCNC: 0.69 MG/DL
EGFR: 106 ML/MIN/1.73M2
EOSINOPHIL # BLD AUTO: 0.56 K/UL — HIGH (ref 0–0.5)
EOSINOPHIL NFR BLD AUTO: 7 % — HIGH (ref 0–6)
ESTRADIOL SERPL-MCNC: 8 PG/ML
FSH SERPL-MCNC: 69.7 IU/L
GLUCOSE SERPL-MCNC: 119 MG/DL
HCT VFR BLD CALC: 35.2 % — SIGNIFICANT CHANGE UP (ref 34.5–45)
HGB BLD-MCNC: 11.7 G/DL — SIGNIFICANT CHANGE UP (ref 11.5–15.5)
LYMPHOCYTES # BLD AUTO: 0.88 K/UL — LOW (ref 1–3.3)
LYMPHOCYTES # BLD AUTO: 11 % — LOW (ref 13–44)
MCHC RBC-ENTMCNC: 33.2 G/DL — SIGNIFICANT CHANGE UP (ref 32–36)
MCHC RBC-ENTMCNC: 33.9 PG — SIGNIFICANT CHANGE UP (ref 27–34)
MCV RBC AUTO: 102 FL — HIGH (ref 80–100)
MONOCYTES # BLD AUTO: 0.32 K/UL — SIGNIFICANT CHANGE UP (ref 0–0.9)
MONOCYTES NFR BLD AUTO: 4 % — SIGNIFICANT CHANGE UP (ref 2–14)
NEUTROPHILS # BLD AUTO: 6.23 K/UL — SIGNIFICANT CHANGE UP (ref 1.8–7.4)
NEUTROPHILS NFR BLD AUTO: 78 % — HIGH (ref 43–77)
NRBC # BLD: 0 /100 — SIGNIFICANT CHANGE UP (ref 0–0)
NRBC # BLD: SIGNIFICANT CHANGE UP /100 WBCS (ref 0–0)
PLAT MORPH BLD: NORMAL — SIGNIFICANT CHANGE UP
PLATELET # BLD AUTO: 228 K/UL — SIGNIFICANT CHANGE UP (ref 150–400)
POTASSIUM SERPL-SCNC: 3.7 MMOL/L
PROT SERPL-MCNC: 6.9 G/DL
RBC # BLD: 3.45 M/UL — LOW (ref 3.8–5.2)
RBC # FLD: 16.7 % — HIGH (ref 10.3–14.5)
RBC BLD AUTO: SIGNIFICANT CHANGE UP
SODIUM SERPL-SCNC: 144 MMOL/L
WBC # BLD: 7.99 K/UL — SIGNIFICANT CHANGE UP (ref 3.8–10.5)
WBC # FLD AUTO: 7.99 K/UL — SIGNIFICANT CHANGE UP (ref 3.8–10.5)

## 2022-11-02 PROCEDURE — 99214 OFFICE O/P EST MOD 30 MIN: CPT

## 2022-11-03 NOTE — PHYSICAL EXAM
[Fully active, able to carry on all pre-disease performance without restriction] : Status 0 - Fully active, able to carry on all pre-disease performance without restriction [Normal] : affect appropriate [de-identified] : s/p right mastectomy with well healed incision and expander in place; no chest wall masses or lymphadenopathy

## 2022-11-03 NOTE — HISTORY OF PRESENT ILLNESS
[Disease: _____________________] : Disease: [unfilled] [T: ___] : T[unfilled] [N: ___] : N[unfilled] [M: ___] : M[unfilled] [AJCC Stage: ____] : AJCC Stage: [unfilled] [de-identified] : Right triple negative breast cancer diagnosed at the age of 51.\par 07/17/21 Mammogram and breast US showed a spiculated mass in the right breast at the 12 o'clock axis 5 cm FN. \par 09/17/21 Unilateral Mammogram and breast US showed a persistent spiculated mass in the right breast about 12 o'clock axis corresponding with a 1.5 x 1.0 x 0.8 irregular hypoechoic nodule with posterior shadowing. Also noted was a 1.3 x 0.6 x 1.2 cm hypoechoic nodule questionable irregularity 10 o'clock axis right breast 2 cm FN. Multiple other small nodules bilaterally suggesting complicated and simple cysts. Bilateral mild duct ectasia retroareolar region. BI-RADS 5.\par 10/19/21 Right breast 12 o'clock axis 3 cm FN core biopsy: ~0.9 cm invasive mammary carcinoma with apocrine features, moderately differentiated ER<1%, [place] 0% and HER-2 (-)\par   Right breast 10 o'clock axis 2 cm FN core biopsy: ~0.7 cm Invasive mammary carcinoma, moderately differentiated. 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   Lymphovascular invasion is identified. The invasive and in situ mammary carcinoma shows a ductal phenotype but diminished/loss of membranous staining. \par 11/11/21 [facility] Breast cancer stat panel of 7 genes negative for pathogenic variants.\par 11/15/21 MRI Breast showed right upper central/slightly inner dominant enhancing mass measuring up to 3.3 cm and containing a biopsy clip, corresponding to one site of biopsy-proven malignancy. There is a 1.5 cm smaller irregular enhancing mass just inferior and lateral to the dominant aforementioned mass, corresponding to a second site of biopsy-proven malignancy. Small enhancing foci and non mass enhancement surrounding both masses, concerning for small satellite lesions and additional disease. There is 2.2 cm thick linear non mass enhancement more inferiorly in the right lateral 9:00 retroareolar location which extends to within 1.1 cm of the nipple. No MRI evidence of malignancy in the contralateral left breast. No lymphadenopathy. A 1.2 cm nonspecific T2 bright right hepatic lobe lesion which is incompletely visualized. Suggest dedicated liver MRI.\par 11/15/21 CT scan of C/A/P showed no imaging evidence of metastatic disease involving the chest, abdomen or pelvis. Two small cysts were noted in the right lobe of the liver but no suspicious lesions. Diffuse bladder wall thickening may indicate cystitis. \par 01/21/22 Right mastectomy and ALND by Dr. Cohen revealed a 2.1 cm IDC, SBR 6/9. Also noted was a 1.7 cm focus of DCIS, cribriform and solid types, intermediate nuclear grade with focal necrosis. Extensive lymphovascular invasion. 1/1 SLN positive with a 1.7 cm focus of disease. Right axillary lymph node dissection revealed 15/18 positive LN.\par She had immediate reconstruction with expander placement by Dr. Blane Ingram.\par 3/3/22 - 6/9/22 Adjuvant chemotherapy with dose dense Adriamycin (60 mg/m2) & Cytoxan (600 mg/m2) with Neulasta x 4 cycles followed by Taxol (175 mg/m2) x 4 cycles, all given every 2 weeks\par 7/24/22 - 8/15/22 Adjuvant radiation with 4240 cGy in 16 fractions with Dr. Moy\par 9/12/22 Adjuvant capecitabine 1500 mg bid 7 days on and 7 days off every 28 days with plan for 6 cycles given high risk disease with 16/19 LN positive\par  [de-identified] : 2.1 cm IDC, SBR 6/9 with 16/19 LN positive, no extranodal extension, triple negative [de-identified] : Idalmis started capecitabine 22 1500 mg bid one week on and one week off.\par She is tolerating the medication well with some dryness in her hands and feet but no pain.  She has had occasional diarrhea but not every day usually 1-2 episodes . She denies taking Imodium, but knows to take it if her symptoms worsen. She usually has normal BM.\par She does find she has decreased appetite and low energy usually on her weeks on the treatment. \par She has mild fatigue but is able to work. She continues to work remotely.\par She has not had a period since before starting chemotherapy. \par She met with Dr. Ingram discuss the implant exchange/SHAGGY flap and will be following upto discuss timing.\par She has had anxiety for many years and is on olanzapine for this with Psychiatrist Dr Navin Canales. Her psychiatrist started her on Klonopin 0.5 mg BID,  and  venlafaxine 37.5 mg in AM and increased Zyprexa from 5 to 10, which has been helping her relax and sleep at nights. \par She is  with one son age 10. She works for [employer] in [place], working remotely. Her  has been gradually withdrawing and although he is still involved with their son and takes him to school he is no longer living in the house.\par \par HEALTH MAINTENANCE:\par PCP: Addis Peres MD\par GYN: Royce Goldman MD\par Mammogram: 21\par Pap smear: 21\par Endometrial polypectomy: 21 Endometrial curetting showed endometrial epithelium with proliferative features. Fragments of endometrial poly and proliferative endometrium\par Colonoscopy: none\par Genetic testin21 [facility] Breast cancer stat panel of 7 genes negative for pathogenic variants\par

## 2022-11-30 ENCOUNTER — APPOINTMENT (OUTPATIENT)
Dept: HEMATOLOGY ONCOLOGY | Facility: CLINIC | Age: 52
End: 2022-11-30

## 2022-11-30 ENCOUNTER — APPOINTMENT (OUTPATIENT)
Dept: INFUSION THERAPY | Facility: HOSPITAL | Age: 52
End: 2022-11-30

## 2022-11-30 VITALS
DIASTOLIC BLOOD PRESSURE: 68 MMHG | OXYGEN SATURATION: 97 % | HEART RATE: 78 BPM | WEIGHT: 128.97 LBS | BODY MASS INDEX: 23.59 KG/M2 | TEMPERATURE: 97.2 F | SYSTOLIC BLOOD PRESSURE: 102 MMHG

## 2022-11-30 PROCEDURE — 99214 OFFICE O/P EST MOD 30 MIN: CPT

## 2022-11-30 NOTE — PHYSICAL EXAM
[Fully active, able to carry on all pre-disease performance without restriction] : Status 0 - Fully active, able to carry on all pre-disease performance without restriction [Normal] : affect appropriate [de-identified] : s/p right mastectomy with well healed incision and expander in place; no chest wall masses or lymphadenopathy

## 2022-11-30 NOTE — HISTORY OF PRESENT ILLNESS
[Disease: _____________________] : Disease: [unfilled] [T: ___] : T[unfilled] [N: ___] : N[unfilled] [M: ___] : M[unfilled] [AJCC Stage: ____] : AJCC Stage: [unfilled] [de-identified] : Right triple negative breast cancer diagnosed at the age of 51.\par 07/17/21 Mammogram and breast US showed a spiculated mass in the right breast at the 12 o'clock axis 5 cm FN. \par 09/17/21 Unilateral Mammogram and breast US showed a persistent spiculated mass in the right breast about 12 o'clock axis corresponding with a 1.5 x 1.0 x 0.8 irregular hypoechoic nodule with posterior shadowing. Also noted was a 1.3 x 0.6 x 1.2 cm hypoechoic nodule questionable irregularity 10 o'clock axis right breast 2 cm FN. Multiple other small nodules bilaterally suggesting complicated and simple cysts. Bilateral mild duct ectasia retroareolar region. BI-RADS 5.\par 10/19/21 Right breast 12 o'clock axis 3 cm FN core biopsy: ~0.9 cm invasive mammary carcinoma with apocrine features, moderately differentiated ER<1%, [place] 0% and HER-2 (-)\par   Right breast 10 o'clock axis 2 cm FN core biopsy: ~0.7 cm Invasive mammary carcinoma, moderately differentiated. 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   Lymphovascular invasion is identified. The invasive and in situ mammary carcinoma shows a ductal phenotype but diminished/loss of membranous staining. \par 11/11/21 [facility] Breast cancer stat panel of 7 genes negative for pathogenic variants.\par 11/15/21 MRI Breast showed right upper central/slightly inner dominant enhancing mass measuring up to 3.3 cm and containing a biopsy clip, corresponding to one site of biopsy-proven malignancy. There is a 1.5 cm smaller irregular enhancing mass just inferior and lateral to the dominant aforementioned mass, corresponding to a second site of biopsy-proven malignancy. Small enhancing foci and non mass enhancement surrounding both masses, concerning for small satellite lesions and additional disease. There is 2.2 cm thick linear non mass enhancement more inferiorly in the right lateral 9:00 retroareolar location which extends to within 1.1 cm of the nipple. No MRI evidence of malignancy in the contralateral left breast. No lymphadenopathy. A 1.2 cm nonspecific T2 bright right hepatic lobe lesion which is incompletely visualized. Suggest dedicated liver MRI.\par 11/15/21 CT scan of C/A/P showed no imaging evidence of metastatic disease involving the chest, abdomen or pelvis. Two small cysts were noted in the right lobe of the liver but no suspicious lesions. Diffuse bladder wall thickening may indicate cystitis. \par 01/21/22 Right mastectomy and ALND by Dr. Cohen revealed a 2.1 cm IDC, SBR 6/9. Also noted was a 1.7 cm focus of DCIS, cribriform and solid types, intermediate nuclear grade with focal necrosis. Extensive lymphovascular invasion. 1/1 SLN positive with a 1.7 cm focus of disease. Right axillary lymph node dissection revealed 15/18 positive LN.\par She had immediate reconstruction with expander placement by Dr. Blane Ingram.\par 3/3/22 - 6/9/22 Adjuvant chemotherapy with dose dense Adriamycin (60 mg/m2) & Cytoxan (600 mg/m2) with Neulasta x 4 cycles followed by Taxol (175 mg/m2) x 4 cycles, all given every 2 weeks\par 7/24/22 - 8/15/22 Adjuvant radiation with 4240 cGy in 16 fractions with Dr. Moy\par 9/12/22 Adjuvant capecitabine 1500 mg bid 7 days on and 7 days off every 28 days with plan for 6 cycles given high risk disease with 16/19 LN positive\par 12/28/22 Start Adjuvant Zometa 4 mg IV every 6 months to be given for three years\par  [de-identified] : 2.1 cm IDC, SBR 6/9 with 16/19 LN positive, no extranodal extension, triple negative [de-identified] : Idalmis started capecitabine 22 1500 mg bid one week on and one week off and is tolerating the medication fairly well although with increasing dryness in her hands and feet over the past month, but no pain. She is moisturizing with Aquafor\par She has been getting more diarrhea at night during her on weeks, which has been controlled with Imodium.\par She has mild fatigue but is able to work. She continues to work remotely.\par She still has numbness in the tips of two of her fingers but not her toes.\par She has not had a period since before starting chemotherapy. \par She has a dental appt in  for dental clearance for Zometa\par She met with Dr. Ingram discuss the implant exchange/SHAGGY flap and will be following up to discuss timing.\par She has had anxiety for many years and is on olanzapine for this with Psychiatrist Dr Navin Canales. Her psychiatrist started her on Klonopin 0.5 mg BID,  and  venlafaxine 37.5 mg in AM and increased Zyprexa from 5 to 10, which has been helping her relax and sleep at nights. \par She is  with one son age 10. She works for [employer] in [place], working remotely. Her  has been gradually withdrawing and although he is still involved with their son and takes him to school he is no longer living in the house.\par \par HEALTH MAINTENANCE:\par PCP: Addis Peres MD\par GYN: Royce Goldman MD\par Mammogram: 21\par Pap smear: 21\par Endometrial polypectomy: 21 Endometrial curetting showed endometrial epithelium with proliferative features. Fragments of endometrial poly and proliferative endometrium\par Colonoscopy: none\par Genetic testin21 [facility] Breast cancer stat panel of 7 genes negative for pathogenic variants\par

## 2022-12-06 ENCOUNTER — APPOINTMENT (OUTPATIENT)
Dept: SURGICAL ONCOLOGY | Facility: CLINIC | Age: 52
End: 2022-12-06

## 2022-12-06 VITALS
WEIGHT: 125 LBS | DIASTOLIC BLOOD PRESSURE: 77 MMHG | RESPIRATION RATE: 16 BRPM | HEART RATE: 89 BPM | TEMPERATURE: 97.5 F | HEIGHT: 62 IN | BODY MASS INDEX: 23 KG/M2 | SYSTOLIC BLOOD PRESSURE: 111 MMHG | OXYGEN SATURATION: 97 %

## 2022-12-06 PROCEDURE — 99213 OFFICE O/P EST LOW 20 MIN: CPT

## 2022-12-06 NOTE — PHYSICAL EXAM
[Normal Supraclavicular Lymph Nodes] : normal supraclavicular lymph nodes [Normal Axillary Lymph Nodes] : normal axillary lymph nodes [Normal] : oriented to person, place and time, with appropriate affect [FreeTextEntry1] : GS present for exam  [de-identified] : Normal S1,S2. Regular rate and rhythm  [de-identified] : s/p right mastectomy with tissue expander reconstruction; no chest wall mass recurrence [de-identified] : Clear breath sounds bilaterally, normal respiratory effort.

## 2022-12-06 NOTE — HISTORY OF PRESENT ILLNESS
[de-identified] : Ms. Idalmis Pisano is a 52 y/o female presents for a follow up visit.\par She is s/p right total mastectomy, right axillary SLNB, right axillary lymph node dissection on 01/21/2022. Final pathology on 01/21/2022 revealed a 2.1 cm invasive ductal carcinoma with apocrine features, (-) margins,  15/18 lymph nodes positive for metastatic carcinoma. Tumor stage: pT2, pN3a, ER/IN/HER2 (-).   Tissue expander reconstruction performed by Dr. Ingram.  \par \par Completed adjuvant chemo March- June 2022. under the care of Dr. Nunez. Completed RT w/ Dr. Moy\par Denies any chest wall masses or tenderness\par \par \par PERTINENT HISTORY- \par 52 y/o female presents for an initial consultation , referred by Dr. Royce Goldman.\par \par MRI performed on 11/10/2021 revealed 2.2 cm thick linear nonmass enhancement more inferiorly in the right later 9:00 retroareolar location which extends to within 1.1 cm of the nipple. No evidence of malignancy in the contralateral left breast, no lymphadenopathy. BIRADS- 4A. Recommendation- MR guided biopsy. \par \par 2 US guided core biopsies were performed on 10/19/21 which revealed:\par 1) Right breast 12:00 3 cmFN: invasive mammary carcinoma, moderately differentiated, spanning at least 9 mm in this material. Primary carcinoma in situ with apocrine features. \par 2) Right breast 10:00 2 cmFN: Invasive mammary carcinoma, moderately differentiated, spanning at least 7 mm in this material. 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 Lymphovascular invasion is identified.  \par ***ER/IN/HER2 status pending***\par \par On 09/17/21 she underwent a Diagnostic mammo and US which revealed persistent spiculated like mass in the right breast at about 12:00 corresponding to a 1.5 cm irregular hypoechoic nodule with posterior shadowing on ultrasound.  There is a 1.3 cm hypoechoic nodule questionable irregularity 10:00 right breast 2 cmFN. Multiple other small nodules bilaterally suggesting complicated and simple cyst. Bilateral mild ductal ectasia retroareolar region. Biopsy recommended. BIRADS 5.  Short term follow up bilateral ultrasound recommended to assess stability of additional nodules.\par \par A routine mammo performed on 7/17/21 revealed a spiculated mass in the right breast at the 12:00, 5 cmFN. Spot compressions views and target sonography recommended to determine if this mass is visualized sonographically unremarkable amenable to a sonographic guided core biopsy. If this is not identified sonographically then stereotactic biopsy recommended for definitive histologic diagnosis. BIRADS 0. \par \par She has a prior history of 2 benign core biopsies of the breast.  Her past medical history includes anxiety and asthma.  She has a family history of breast cancer in a paternal first cousin in her 30's and maternal first cousin in her 50's.  She currently takes OTC estroven for vasomotor symptoms.  She states that she had not had a menstrual period for over a year, but got her period after the recent biopsy.\par \par She denies any palpable breast masses, nipple discharge, inversion or skin change.

## 2022-12-06 NOTE — ASSESSMENT
[FreeTextEntry1] : IMP:\par s/p right total mastectomy, right axillary SLNB, right axillary lymph node dissection on 01/21/2022. Final pathology on 01/21/2022 revealed a 2.1 cm invasive ductal carcinoma with apocrine features, (-) margins,  15/18 lymph nodes positive for metastatic carcinoma. Tumor stage: pT2, pN3a, ER/TN/HER2 (-).   Tissue expander reconstruction performed by Dr. Ingram. \par \par PLAN:\par Continue to f/u with Dr. Zavala\par Left mammo/sono now \par Plan for left mastectomy with reconstruction \par \par \par \par All medical entries were at my, Dr. William Cohen, direction. I have reviewed the chart and agree that the record accurately reflects my personal performance of the history, physical exam, assessment and plan. Our office Nurse Practitioner was present of the duration of the office visit. \par \par

## 2022-12-20 ENCOUNTER — OUTPATIENT (OUTPATIENT)
Dept: OUTPATIENT SERVICES | Facility: HOSPITAL | Age: 52
LOS: 1 days | Discharge: HOME | End: 2022-12-20

## 2022-12-20 DIAGNOSIS — Z98.890 OTHER SPECIFIED POSTPROCEDURAL STATES: Chronic | ICD-10-CM

## 2022-12-20 DIAGNOSIS — Z98.891 HISTORY OF UTERINE SCAR FROM PREVIOUS SURGERY: Chronic | ICD-10-CM

## 2022-12-20 DIAGNOSIS — C50.919 MALIGNANT NEOPLASM OF UNSPECIFIED SITE OF UNSPECIFIED FEMALE BREAST: ICD-10-CM

## 2022-12-20 DIAGNOSIS — Z90.11 ACQUIRED ABSENCE OF RIGHT BREAST AND NIPPLE: Chronic | ICD-10-CM

## 2022-12-27 ENCOUNTER — RESULT REVIEW (OUTPATIENT)
Age: 52
End: 2022-12-27

## 2022-12-27 ENCOUNTER — APPOINTMENT (OUTPATIENT)
Dept: ULTRASOUND IMAGING | Facility: IMAGING CENTER | Age: 52
End: 2022-12-27
Payer: COMMERCIAL

## 2022-12-27 ENCOUNTER — APPOINTMENT (OUTPATIENT)
Dept: MAMMOGRAPHY | Facility: IMAGING CENTER | Age: 52
End: 2022-12-27
Payer: COMMERCIAL

## 2022-12-27 ENCOUNTER — OUTPATIENT (OUTPATIENT)
Dept: OUTPATIENT SERVICES | Facility: HOSPITAL | Age: 52
LOS: 1 days | End: 2022-12-27
Payer: COMMERCIAL

## 2022-12-27 DIAGNOSIS — C50.911 MALIGNANT NEOPLASM OF UNSPECIFIED SITE OF RIGHT FEMALE BREAST: ICD-10-CM

## 2022-12-27 DIAGNOSIS — Z90.11 ACQUIRED ABSENCE OF RIGHT BREAST AND NIPPLE: Chronic | ICD-10-CM

## 2022-12-27 DIAGNOSIS — Z98.890 OTHER SPECIFIED POSTPROCEDURAL STATES: Chronic | ICD-10-CM

## 2022-12-27 DIAGNOSIS — Z98.891 HISTORY OF UTERINE SCAR FROM PREVIOUS SURGERY: Chronic | ICD-10-CM

## 2022-12-27 PROCEDURE — 77065 DX MAMMO INCL CAD UNI: CPT | Mod: 26,LT

## 2022-12-27 PROCEDURE — 76641 ULTRASOUND BREAST COMPLETE: CPT | Mod: 26,LT

## 2022-12-27 PROCEDURE — 77065 DX MAMMO INCL CAD UNI: CPT

## 2022-12-27 PROCEDURE — 76641 ULTRASOUND BREAST COMPLETE: CPT

## 2022-12-27 PROCEDURE — G0279: CPT | Mod: 26

## 2022-12-27 PROCEDURE — G0279: CPT

## 2022-12-28 ENCOUNTER — APPOINTMENT (OUTPATIENT)
Dept: INFUSION THERAPY | Facility: HOSPITAL | Age: 52
End: 2022-12-28

## 2022-12-28 ENCOUNTER — APPOINTMENT (OUTPATIENT)
Dept: HEMATOLOGY ONCOLOGY | Facility: CLINIC | Age: 52
End: 2022-12-28

## 2022-12-28 VITALS
BODY MASS INDEX: 22.58 KG/M2 | WEIGHT: 123.46 LBS | SYSTOLIC BLOOD PRESSURE: 112 MMHG | RESPIRATION RATE: 15 BRPM | TEMPERATURE: 97 F | OXYGEN SATURATION: 97 % | HEART RATE: 77 BPM | DIASTOLIC BLOOD PRESSURE: 77 MMHG

## 2022-12-28 PROCEDURE — 99214 OFFICE O/P EST MOD 30 MIN: CPT

## 2022-12-28 NOTE — HISTORY OF PRESENT ILLNESS
[Disease: _____________________] : Disease: [unfilled] [T: ___] : T[unfilled] [N: ___] : N[unfilled] [M: ___] : M[unfilled] [AJCC Stage: ____] : AJCC Stage: [unfilled] [de-identified] : Right triple negative breast cancer diagnosed at the age of 51.\par 07/17/21 Mammogram and breast US showed a spiculated mass in the right breast at the 12 o'clock axis 5 cm FN. \par 09/17/21 Unilateral Mammogram and breast US showed a persistent spiculated mass in the right breast about 12 o'clock axis corresponding with a 1.5 x 1.0 x 0.8 irregular hypoechoic nodule with posterior shadowing. Also noted was a 1.3 x 0.6 x 1.2 cm hypoechoic nodule questionable irregularity 10 o'clock axis right breast 2 cm FN. Multiple other small nodules bilaterally suggesting complicated and simple cysts. Bilateral mild duct ectasia retroareolar region. BI-RADS 5.\par 10/19/21 Right breast 12 o'clock axis 3 cm FN core biopsy: ~0.9 cm invasive mammary carcinoma with apocrine features, moderately differentiated ER<1%, CT 0% and HER-2 (-)\par   Right breast 10 o'clock axis 2 cm FN core biopsy: ~0.7 cm Invasive mammary carcinoma, moderately differentiated. 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   Lymphovascular invasion is identified. The invasive and in situ mammary carcinoma shows a ductal phenotype but diminished/loss of membranous staining. \par 11/11/21 [facility] Breast cancer stat panjcarletoel of 7 genes negative for pathogenic variants.\par 11/15/21 MRI Breast showed right upper central/slightly inner dominant enhancing mass measuring up to 3.3 cm and containing a biopsy clip, corresponding to one site of biopsy-proven malignancy. There is a 1.5 cm smaller irregular enhancing mass just inferior and lateral to the dominant aforementioned mass, corresponding to a second site of biopsy-proven malignancy. Small enhancing foci and non mass enhancement surrounding both masses, concerning for small satellite lesions and additional disease. There is 2.2 cm thick linear non mass enhancement more inferiorly in the right lateral 9:00 retroareolar location which extends to within 1.1 cm of the nipple. No MRI evidence of malignancy in the contralateral left breast. No lymphadenopathy. A 1.2 cm nonspecific T2 bright right hepatic lobe lesion which is incompletely visualized. Suggest dedicated liver MRI.\par 11/15/21 CT scan of C/A/P showed no imaging evidence of metastatic disease involving the chest, abdomen or pelvis. Two small cysts were noted in the right lobe of the liver but no suspicious lesions. Diffuse bladder wall thickening may indicate cystitis. \par 01/21/22 Right mastectomy and ALND by Dr. Cohen revealed a 2.1 cm IDC, SBR 6/9. Also noted was a 1.7 cm focus of DCIS, cribriform and solid types, intermediate nuclear grade with focal necrosis. Extensive lymphovascular invasion. 1/1 SLN positive with a 1.7 cm focus of disease. Right axillary lymph node dissection revealed 15/18 positive LN.\par She had immediate reconstruction with expander placement by Dr. Blane Ingram.\par 3/3/22 - 6/9/22 Adjuvant chemotherapy with dose dense Adriamycin (60 mg/m2) & Cytoxan (600 mg/m2) with Neulasta x 4 cycles followed by Taxol (175 mg/m2) x 4 cycles, all given every 2 weeks\par 7/24/22 - 8/15/22 Adjuvant radiation with 4240 cGy in 16 fractions with Dr. Moy\par 9/12/22 Adjuvant capecitabine 1500 mg bid 7 days on and 7 days off every 28 days with plan for 6 cycles given high risk disease with 16/19 LN positive\par 12/28/22 Start Adjuvant Zometa 4 mg IV every 6 months to be given for three years\par  [de-identified] : 2.1 cm IDC, SBR 6/9 with 16/19 LN positive, no extranodal extension, triple negative [de-identified] : Idalmis started capecitabine 22 1500 mg bid one week on and one week off and is tolerating the medication fairly well although with increasing dryness in her hands and feet over the past month, but no pain. She is moisturizing with Aquafor.\par She continues to have some diarrhea, worse during her on weeks and is now taking Imodium at night and as needed which is helping\par She has mild fatigue but is able to work. She continues to work remotely.\par She still has numbness in the tips of two of her fingers but not her toes.\par She has not had a period since before starting chemotherapy. \par She has dental clearance for Zometa and will start 22\par She met with Dr. Ingram discuss the implant exchange/SHAGGY flap and will be following up to discuss timing.\par She has had anxiety for many years and is on olanzapine for this with Psychiatrist Dr Navin Canales. Her psychiatrist started her on Klonopin 0.5 mg BID and venlafaxine 37.5 mg in AM and increased Zyprexa from 5 to 10, which has been helping her sleep at nights. \par She is  with one son age 10. She works for [email or web address] in [employer], working remotely. Her  has been gradually withdrawing and although he is still involved with their son and takes him to school he is no longer living in the house.\par \par HEALTH MAINTENANCE:\par PCP: Addis Peres MD\par GYN: Royce Goldman MD\par Mammogram: 21\par Pap smear: 21\par Endometrial polypectomy: 21 Endometrial curetting showed endometrial epithelium with proliferative features. Fragments of endometrial poly and proliferative endometrium\par Colonoscopy: none\par Genetic testin21 [facility] Breast cancer stat panel of 7 genes negative for pathogenic variants\par

## 2022-12-28 NOTE — PHYSICAL EXAM
[Fully active, able to carry on all pre-disease performance without restriction] : Status 0 - Fully active, able to carry on all pre-disease performance without restriction [Normal] : affect appropriate [de-identified] : s/p right mastectomy with well healed incision and expander in place; no chest wall masses or lymphadenopathy

## 2023-01-03 ENCOUNTER — OUTPATIENT (OUTPATIENT)
Dept: OUTPATIENT SERVICES | Facility: HOSPITAL | Age: 53
LOS: 1 days | Discharge: HOME | End: 2023-01-03

## 2023-01-03 DIAGNOSIS — Z98.890 OTHER SPECIFIED POSTPROCEDURAL STATES: Chronic | ICD-10-CM

## 2023-01-03 DIAGNOSIS — Z90.11 ACQUIRED ABSENCE OF RIGHT BREAST AND NIPPLE: Chronic | ICD-10-CM

## 2023-01-03 DIAGNOSIS — Z98.891 HISTORY OF UTERINE SCAR FROM PREVIOUS SURGERY: Chronic | ICD-10-CM

## 2023-01-03 DIAGNOSIS — C50.919 MALIGNANT NEOPLASM OF UNSPECIFIED SITE OF UNSPECIFIED FEMALE BREAST: ICD-10-CM

## 2023-01-04 ENCOUNTER — APPOINTMENT (OUTPATIENT)
Dept: HEMATOLOGY ONCOLOGY | Facility: CLINIC | Age: 53
End: 2023-01-04

## 2023-01-10 ENCOUNTER — APPOINTMENT (OUTPATIENT)
Dept: HEMATOLOGY ONCOLOGY | Facility: CLINIC | Age: 53
End: 2023-01-10

## 2023-02-01 ENCOUNTER — APPOINTMENT (OUTPATIENT)
Dept: HEMATOLOGY ONCOLOGY | Facility: CLINIC | Age: 53
End: 2023-02-01
Payer: COMMERCIAL

## 2023-02-02 ENCOUNTER — APPOINTMENT (OUTPATIENT)
Dept: HEMATOLOGY ONCOLOGY | Facility: CLINIC | Age: 53
End: 2023-02-02
Payer: COMMERCIAL

## 2023-02-02 VITALS
TEMPERATURE: 97 F | SYSTOLIC BLOOD PRESSURE: 103 MMHG | DIASTOLIC BLOOD PRESSURE: 72 MMHG | WEIGHT: 123.46 LBS | RESPIRATION RATE: 15 BRPM | OXYGEN SATURATION: 99 % | HEART RATE: 76 BPM | BODY MASS INDEX: 22.58 KG/M2

## 2023-02-02 PROCEDURE — 99214 OFFICE O/P EST MOD 30 MIN: CPT

## 2023-02-02 NOTE — PHYSICAL EXAM
[de-identified] : s/p right mastectomy with well healed incision and expander in place; no chest wall masses or lymphadenopathy

## 2023-02-02 NOTE — HISTORY OF PRESENT ILLNESS
[de-identified] : Right triple negative breast cancer diagnosed at the age of 51.\par 07/17/21 Mammogram and breast US showed a spiculated mass in the right breast at the 12 o'clock axis 5 cm FN. \par 09/17/21 Unilateral Mammogram and breast US showed a persistent spiculated mass in the right breast about 12 o'clock axis corresponding with a 1.5 x 1.0 x 0.8 irregular hypoechoic nodule with posterior shadowing. Also noted was a 1.3 x 0.6 x 1.2 cm hypoechoic nodule questionable irregularity 10 o'clock axis right breast 2 cm FN. Multiple other small nodules bilaterally suggesting complicated and simple cysts. Bilateral mild duct ectasia retroareolar region. BI-RADS 5.\par 10/19/21 Right breast 12 o'clock axis 3 cm FN core biopsy: ~0.9 cm invasive mammary carcinoma with apocrine features, moderately differentiated ER<1%, [place] 0% and HER-2 (-)\par   Right breast 10 o'clock axis 2 cm FN core biopsy: ~0.7 cm Invasive mammary carcinoma, moderately differentiated. 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   Lymphovascular invasion is identified. The invasive and in situ mammary carcinoma shows a ductal phenotype but diminished/loss of membranous staining. \par 11/11/21 [facility] Breast cancer stat panjcarletoel of 7 genes negative for pathogenic variants.\par 11/15/21 MRI Breast showed right upper central/slightly inner dominant enhancing mass measuring up to 3.3 cm and containing a biopsy clip, corresponding to one site of biopsy-proven malignancy. There is a 1.5 cm smaller irregular enhancing mass just inferior and lateral to the dominant aforementioned mass, corresponding to a second site of biopsy-proven malignancy. Small enhancing foci and non mass enhancement surrounding both masses, concerning for small satellite lesions and additional disease. There is 2.2 cm thick linear non mass enhancement more inferiorly in the right lateral 9:00 retroareolar location which extends to within 1.1 cm of the nipple. No MRI evidence of malignancy in the contralateral left breast. No lymphadenopathy. A 1.2 cm nonspecific T2 bright right hepatic lobe lesion which is incompletely visualized. Suggest dedicated liver MRI.\par 11/15/21 CT scan of C/A/P showed no imaging evidence of metastatic disease involving the chest, abdomen or pelvis. Two small cysts were noted in the right lobe of the liver but no suspicious lesions. Diffuse bladder wall thickening may indicate cystitis. \par 01/21/22 Right mastectomy and ALND by Dr. Cohen revealed a 2.1 cm IDC, SBR 6/9. Also noted was a 1.7 cm focus of DCIS, cribriform and solid types, intermediate nuclear grade with focal necrosis. Extensive lymphovascular invasion. 1/1 SLN positive with a 1.7 cm focus of disease. Right axillary lymph node dissection revealed 15/18 positive LN.\par She had immediate reconstruction with expander placement by Dr. Blane Ingram.\par 3/3/22 - 6/9/22 Adjuvant chemotherapy with dose dense Adriamycin (60 mg/m2) & Cytoxan (600 mg/m2) with Neulasta x 4 cycles followed by Taxol (175 mg/m2) x 4 cycles, all given every 2 weeks\par 7/24/22 - 8/15/22 Adjuvant radiation with 4240 cGy in 16 fractions with Dr. Moy\par 9/12/22 Adjuvant capecitabine 1500 mg bid 7 days on and 7 days off every 28 days with plan for 6 cycles given high risk disease with 16/19 LN positive\par 12/28/22 Start Adjuvant Zometa 4 mg IV every 6 months to be given for three years\par  [de-identified] : 2.1 cm IDC, SBR 6/9 with 16/19 LN positive, no extranodal extension, triple negative [de-identified] : Idalmis started capecitabine 22 1500 mg bid one week on and one week off and is tolerating the medication fairly well although with increasing dryness in her hands and feet over the past month, but no pain. She is moisturizing with Aquafor. She started her 6th cycle this week.\par She continues to have some diarrhea, worse during her on weeks and is now taking Imodium at night and as needed which is helping\par She has mild fatigue but is able to work. She continues to work remotely.\par She still has numbness in the tips of two of her fingers but not her toes.\par She has not had a period since before starting chemotherapy. \par She had dental clearance for Zometa and started 22 and will continue every 6 months\par She met with Dr. Ingram discuss the implant exchange/SHAGGY flap and is scheduled for surgery in 2023\par She has had anxiety for many years and is on olanzapine for this with Psychiatrist Dr Navin Canales. Her psychiatrist started her on Klonopin 0.5 mg BID and venlafaxine 37.5 mg in AM and increased Zyprexa from 5 to 10, which has been helping her sleep at nights. \par She is  with one son age 10. She works for [employer] in [employer], working remotely. Her  has been gradually withdrawing and although he is still involved with their son and takes him to school he is no longer living in the house.\par \par HEALTH MAINTENANCE:\par PCP: Addis Peres MD\par GYN: Royce Goldman MD\par Mammogram: 21\par Pap smear: 21\par Endometrial polypectomy: 21 Endometrial curetting showed endometrial epithelium with proliferative features. Fragments of endometrial poly and proliferative endometrium\par Colonoscopy: none\par Genetic testin21 [facility] Breast cancer stat panel of 7 genes negative for pathogenic variants\par

## 2023-03-02 ENCOUNTER — APPOINTMENT (OUTPATIENT)
Dept: HEMATOLOGY ONCOLOGY | Facility: CLINIC | Age: 53
End: 2023-03-02
Payer: COMMERCIAL

## 2023-03-10 ENCOUNTER — OUTPATIENT (OUTPATIENT)
Dept: OUTPATIENT SERVICES | Facility: HOSPITAL | Age: 53
LOS: 1 days | Discharge: HOME | End: 2023-03-10

## 2023-03-10 DIAGNOSIS — C50.919 MALIGNANT NEOPLASM OF UNSPECIFIED SITE OF UNSPECIFIED FEMALE BREAST: ICD-10-CM

## 2023-03-10 DIAGNOSIS — Z98.891 HISTORY OF UTERINE SCAR FROM PREVIOUS SURGERY: Chronic | ICD-10-CM

## 2023-03-10 DIAGNOSIS — Z98.890 OTHER SPECIFIED POSTPROCEDURAL STATES: Chronic | ICD-10-CM

## 2023-03-10 DIAGNOSIS — Z90.11 ACQUIRED ABSENCE OF RIGHT BREAST AND NIPPLE: Chronic | ICD-10-CM

## 2023-03-14 ENCOUNTER — APPOINTMENT (OUTPATIENT)
Dept: HEMATOLOGY ONCOLOGY | Facility: CLINIC | Age: 53
End: 2023-03-14
Payer: COMMERCIAL

## 2023-03-22 NOTE — H&P PST ADULT - GUM GEN PE MLT EXAM PC
RETURN TO SCHOOL    March 22, 2023  Racquel JERMAINE Mcbride      To whom it may concern    This is to certify that the above named patient has been under my care.    Racquel Mcbride was seen in the clinic on 3/22/2023.     She may return on 3/22/2023.          Thank you,        SIGNATURE:____________________________________________________                                                               Dr. Maya Graham                                        not examined

## 2023-03-27 ENCOUNTER — APPOINTMENT (OUTPATIENT)
Dept: CT IMAGING | Facility: IMAGING CENTER | Age: 53
End: 2023-03-27

## 2023-03-27 ENCOUNTER — APPOINTMENT (OUTPATIENT)
Dept: HEMATOLOGY ONCOLOGY | Facility: CLINIC | Age: 53
End: 2023-03-27
Payer: COMMERCIAL

## 2023-03-30 ENCOUNTER — RESULT REVIEW (OUTPATIENT)
Age: 53
End: 2023-03-30

## 2023-03-30 ENCOUNTER — APPOINTMENT (OUTPATIENT)
Dept: HEMATOLOGY ONCOLOGY | Facility: CLINIC | Age: 53
End: 2023-03-30
Payer: COMMERCIAL

## 2023-03-30 VITALS
RESPIRATION RATE: 17 BRPM | HEART RATE: 96 BPM | BODY MASS INDEX: 23.39 KG/M2 | SYSTOLIC BLOOD PRESSURE: 112 MMHG | OXYGEN SATURATION: 98 % | TEMPERATURE: 98.4 F | DIASTOLIC BLOOD PRESSURE: 76 MMHG | WEIGHT: 127.87 LBS

## 2023-03-30 LAB
BASOPHILS # BLD AUTO: 0.03 K/UL — SIGNIFICANT CHANGE UP (ref 0–0.2)
BASOPHILS NFR BLD AUTO: 0.4 % — SIGNIFICANT CHANGE UP (ref 0–2)
EOSINOPHIL # BLD AUTO: 0.15 K/UL — SIGNIFICANT CHANGE UP (ref 0–0.5)
EOSINOPHIL NFR BLD AUTO: 1.9 % — SIGNIFICANT CHANGE UP (ref 0–6)
HCT VFR BLD CALC: 39.2 % — SIGNIFICANT CHANGE UP (ref 34.5–45)
HGB BLD-MCNC: 12.9 G/DL — SIGNIFICANT CHANGE UP (ref 11.5–15.5)
IMM GRANULOCYTES NFR BLD AUTO: 0.4 % — SIGNIFICANT CHANGE UP (ref 0–0.9)
LYMPHOCYTES # BLD AUTO: 0.8 K/UL — LOW (ref 1–3.3)
LYMPHOCYTES # BLD AUTO: 10.3 % — LOW (ref 13–44)
MCHC RBC-ENTMCNC: 32.9 G/DL — SIGNIFICANT CHANGE UP (ref 32–36)
MCHC RBC-ENTMCNC: 35 PG — HIGH (ref 27–34)
MCV RBC AUTO: 106.2 FL — HIGH (ref 80–100)
MONOCYTES # BLD AUTO: 0.6 K/UL — SIGNIFICANT CHANGE UP (ref 0–0.9)
MONOCYTES NFR BLD AUTO: 7.7 % — SIGNIFICANT CHANGE UP (ref 2–14)
NEUTROPHILS # BLD AUTO: 6.16 K/UL — SIGNIFICANT CHANGE UP (ref 1.8–7.4)
NEUTROPHILS NFR BLD AUTO: 79.3 % — HIGH (ref 43–77)
NRBC # BLD: 0 /100 WBCS — SIGNIFICANT CHANGE UP (ref 0–0)
PLATELET # BLD AUTO: 247 K/UL — SIGNIFICANT CHANGE UP (ref 150–400)
RBC # BLD: 3.69 M/UL — LOW (ref 3.8–5.2)
RBC # FLD: 13.3 % — SIGNIFICANT CHANGE UP (ref 10.3–14.5)
WBC # BLD: 7.77 K/UL — SIGNIFICANT CHANGE UP (ref 3.8–10.5)
WBC # FLD AUTO: 7.77 K/UL — SIGNIFICANT CHANGE UP (ref 3.8–10.5)

## 2023-03-30 PROCEDURE — 99214 OFFICE O/P EST MOD 30 MIN: CPT

## 2023-03-30 RX ORDER — CAPECITABINE 500 MG/1
500 TABLET ORAL
Qty: 84 | Refills: 4 | Status: DISCONTINUED | COMMUNITY
Start: 2022-09-07 | End: 2023-03-30

## 2023-03-30 NOTE — HISTORY OF PRESENT ILLNESS
[Disease: _____________________] : Disease: [unfilled] [T: ___] : T[unfilled] [N: ___] : N[unfilled] [M: ___] : M[unfilled] [AJCC Stage: ____] : AJCC Stage: [unfilled] [de-identified] : Right triple negative breast cancer diagnosed at the age of 51.\par 07/17/21 Mammogram and breast US showed a spiculated mass in the right breast at the 12 o'clock axis 5 cm FN. \par 09/17/21 Unilateral Mammogram and breast US showed a persistent spiculated mass in the right breast about 12 o'clock axis corresponding with a 1.5 x 1.0 x 0.8 irregular hypoechoic nodule with posterior shadowing. Also noted was a 1.3 x 0.6 x 1.2 cm hypoechoic nodule questionable irregularity 10 o'clock axis right breast 2 cm FN. Multiple other small nodules bilaterally suggesting complicated and simple cysts. Bilateral mild duct ectasia retroareolar region. BI-RADS 5.\par 10/19/21 Right breast 12 o'clock axis 3 cm FN core biopsy: ~0.9 cm invasive mammary carcinoma with apocrine features, moderately differentiated ER<1%, [place] 0% and HER-2 (-)\par   Right breast 10 o'clock axis 2 cm FN core biopsy: ~0.7 cm Invasive mammary carcinoma, moderately differentiated. 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   Lymphovascular invasion is identified. The invasive and in situ mammary carcinoma shows a ductal phenotype but diminished/loss of membranous staining. \par 11/11/21 [facility] Breast cancer stat panjcarletoel of 7 genes negative for pathogenic variants.\par 11/15/21 MRI Breast showed right upper central/slightly inner dominant enhancing mass measuring up to 3.3 cm and containing a biopsy clip, corresponding to one site of biopsy-proven malignancy. There is a 1.5 cm smaller irregular enhancing mass just inferior and lateral to the dominant aforementioned mass, corresponding to a second site of biopsy-proven malignancy. Small enhancing foci and non mass enhancement surrounding both masses, concerning for small satellite lesions and additional disease. There is 2.2 cm thick linear non mass enhancement more inferiorly in the right lateral 9:00 retroareolar location which extends to within 1.1 cm of the nipple. No MRI evidence of malignancy in the contralateral left breast. No lymphadenopathy. A 1.2 cm nonspecific T2 bright right hepatic lobe lesion which is incompletely visualized. Suggest dedicated liver MRI.\par 11/15/21 CT scan of C/A/P showed no imaging evidence of metastatic disease involving the chest, abdomen or pelvis. Two small cysts were noted in the right lobe of the liver but no suspicious lesions. Diffuse bladder wall thickening may indicate cystitis. \par 01/21/22 Right mastectomy and ALND by Dr. Cohen revealed a 2.1 cm IDC, SBR 6/9. Also noted was a 1.7 cm focus of DCIS, cribriform and solid types, intermediate nuclear grade with focal necrosis. Extensive lymphovascular invasion. 1/1 SLN positive with a 1.7 cm focus of disease. Right axillary lymph node dissection revealed 15/18 positive LN.\par She had immediate reconstruction with expander placement by Dr. Blane Ingram.\par 3/3/22 - 6/9/22 Adjuvant chemotherapy with dose dense Adriamycin (60 mg/m2) & Cytoxan (600 mg/m2) with Neulasta x 4 cycles followed by Taxol (175 mg/m2) x 4 cycles, all given every 2 weeks\par 7/24/22 - 8/15/22 Adjuvant radiation with 4240 cGy in 16 fractions with Dr. Moy\par 9/12/22 - 3/2023 Adjuvant capecitabine 1500 mg bid 7 days on and 7 days off every 28 days with 6 cycles given high risk disease with 16/19 LN positive\par 12/28/22 Start Adjuvant Zometa 4 mg IV every 6 months to be given for three years\par  [de-identified] : 2.1 cm IDC, SBR 6/9 with 16/19 LN positive, no extranodal extension, triple negative [de-identified] : Idalmis completed 6 cycles of capecitabine in early 3/23 with 1500 mg bid one week on and one week off.\par Since completing the chemotherapy her fatigue is improving and her hands and feet are improving.\par The hand and foot discoloration has finally resolved with less dryness in her hands and feet. She continues to moisturize with Aquafor. \par She continues to have diarrhea, which has been under control taking one Imodium every night. \par She still has numbness in the tips of two of her fingers but not her toes.\par She has not had a period since before starting chemotherapy. \par She had dental clearance for Zometa and started 22 and will continue every 6 months\par She met with Dr. Ingram discuss the implant exchange/SHAGGY flap and is scheduled for surgery in 4/10/2023 and second surgery scheduled for  23\par She has had anxiety for many years and is on olanzapine for this with Psychiatrist Dr Navin Canales. Her psychiatrist started her on Klonopin 0.5 mg BID and venlafaxine 37.5 mg in AM and increased Zyprexa from 5 to 10, which has been helping her sleep at nights. \par She is  with one son age 10. . She works for [employer] in [employer], working remotely. Her  has been gradually withdrawing from the family and although he is still involved with their son and takes him to school he is no longer living in the house.\par \par HEALTH MAINTENANCE:\par PCP: Addis Peres MD\par GYN: Royce Goldman MD\par Mammogram: 22 Left mammogram BI-RADS 2 Benign finding \par Pap smear: 22 negative for IEL\par Endometrial polypectomy: 21 Endometrial curetting showed endometrial epithelium with proliferative features. Fragments of endometrial poly and proliferative endometrium\par Colonoscopy: none, referred to see GI.\par Genetic testin21 [facility] Breast cancer stat panel of 7 genes negative for pathogenic variants\par

## 2023-03-30 NOTE — PHYSICAL EXAM
[Fully active, able to carry on all pre-disease performance without restriction] : Status 0 - Fully active, able to carry on all pre-disease performance without restriction [Normal] : affect appropriate [de-identified] : s/p right mastectomy with well healed incision and expander in place; no chest wall masses or lymphadenopathy [de-identified] : + dryness in palms of the hands and soles of the feet.

## 2023-04-01 ENCOUNTER — OUTPATIENT (OUTPATIENT)
Dept: OUTPATIENT SERVICES | Facility: HOSPITAL | Age: 53
LOS: 1 days | End: 2023-04-01
Payer: COMMERCIAL

## 2023-04-01 ENCOUNTER — APPOINTMENT (OUTPATIENT)
Dept: CT IMAGING | Facility: IMAGING CENTER | Age: 53
End: 2023-04-01
Payer: COMMERCIAL

## 2023-04-01 DIAGNOSIS — Z98.890 OTHER SPECIFIED POSTPROCEDURAL STATES: Chronic | ICD-10-CM

## 2023-04-01 DIAGNOSIS — Z00.8 ENCOUNTER FOR OTHER GENERAL EXAMINATION: ICD-10-CM

## 2023-04-01 DIAGNOSIS — Z90.11 ACQUIRED ABSENCE OF RIGHT BREAST AND NIPPLE: Chronic | ICD-10-CM

## 2023-04-01 DIAGNOSIS — Z98.891 HISTORY OF UTERINE SCAR FROM PREVIOUS SURGERY: Chronic | ICD-10-CM

## 2023-04-01 PROCEDURE — 74174 CTA ABD&PLVS W/CONTRAST: CPT

## 2023-04-01 PROCEDURE — 74174 CTA ABD&PLVS W/CONTRAST: CPT | Mod: 26

## 2023-04-10 LAB
ALBUMIN SERPL ELPH-MCNC: 4.9 G/DL
ALP BLD-CCNC: 113 U/L
ALT SERPL-CCNC: 19 U/L
ANION GAP SERPL CALC-SCNC: 13 MMOL/L
AST SERPL-CCNC: 25 U/L
BILIRUB SERPL-MCNC: 0.4 MG/DL
BUN SERPL-MCNC: 13 MG/DL
CALCIUM SERPL-MCNC: 9.6 MG/DL
CHLORIDE SERPL-SCNC: 107 MMOL/L
CO2 SERPL-SCNC: 23 MMOL/L
CREAT SERPL-MCNC: 0.79 MG/DL
EGFR: 91 ML/MIN/1.73M2
GLUCOSE SERPL-MCNC: 103 MG/DL
POTASSIUM SERPL-SCNC: 4.3 MMOL/L
PROT SERPL-MCNC: 7 G/DL
SODIUM SERPL-SCNC: 144 MMOL/L

## 2023-04-21 ENCOUNTER — APPOINTMENT (OUTPATIENT)
Dept: SURGICAL ONCOLOGY | Facility: HOSPITAL | Age: 53
End: 2023-04-21
Payer: COMMERCIAL

## 2023-04-21 PROCEDURE — 19303 MAST SIMPLE COMPLETE: CPT | Mod: LT

## 2023-05-16 NOTE — ASSESSMENT
[FreeTextEntry1] : IMP:\par s/p right total mastectomy, right axillary SLNB, right axillary lymph node dissection on 01/21/2022. Final pathology on 01/21/2022 revealed a 2.1 cm invasive ductal carcinoma with apocrine features, (-) margins,  15/18 lymph nodes positive for metastatic carcinoma. Tumor stage: pT2, pN3a, ER/[place]/HER2 (-).   Tissue expander reconstruction performed by Dr. Ingram. \par \par PLAN:\par Continue f/u with Dr. Ingram\par Referral to med-onc and rad-onc\par RTO 3-6 months \par \par \par  eye/Right:

## 2023-05-22 ENCOUNTER — OUTPATIENT (OUTPATIENT)
Dept: OUTPATIENT SERVICES | Facility: HOSPITAL | Age: 53
LOS: 1 days | Discharge: HOME | End: 2023-05-22

## 2023-05-22 DIAGNOSIS — Z90.11 ACQUIRED ABSENCE OF RIGHT BREAST AND NIPPLE: Chronic | ICD-10-CM

## 2023-05-22 DIAGNOSIS — Z98.890 OTHER SPECIFIED POSTPROCEDURAL STATES: Chronic | ICD-10-CM

## 2023-05-22 DIAGNOSIS — C50.919 MALIGNANT NEOPLASM OF UNSPECIFIED SITE OF UNSPECIFIED FEMALE BREAST: ICD-10-CM

## 2023-05-22 DIAGNOSIS — Z98.891 HISTORY OF UTERINE SCAR FROM PREVIOUS SURGERY: Chronic | ICD-10-CM

## 2023-06-06 ENCOUNTER — RESULT REVIEW (OUTPATIENT)
Age: 53
End: 2023-06-06

## 2023-06-06 ENCOUNTER — APPOINTMENT (OUTPATIENT)
Dept: HEMATOLOGY ONCOLOGY | Facility: CLINIC | Age: 53
End: 2023-06-06
Payer: COMMERCIAL

## 2023-06-06 ENCOUNTER — APPOINTMENT (OUTPATIENT)
Dept: INFUSION THERAPY | Facility: HOSPITAL | Age: 53
End: 2023-06-06

## 2023-06-06 VITALS
TEMPERATURE: 97.4 F | OXYGEN SATURATION: 94 % | HEART RATE: 81 BPM | SYSTOLIC BLOOD PRESSURE: 110 MMHG | RESPIRATION RATE: 16 BRPM | DIASTOLIC BLOOD PRESSURE: 78 MMHG | WEIGHT: 114.2 LBS | BODY MASS INDEX: 20.89 KG/M2

## 2023-06-06 DIAGNOSIS — R53.83 OTHER FATIGUE: ICD-10-CM

## 2023-06-06 DIAGNOSIS — K52.1 TOXIC GASTROENTERITIS AND COLITIS: ICD-10-CM

## 2023-06-06 DIAGNOSIS — L27.1 LOCALIZED SKIN ERUPTION DUE TO DRUGS AND MEDICAMENTS TAKEN INTERNALLY: ICD-10-CM

## 2023-06-06 DIAGNOSIS — T45.1X5A TOXIC GASTROENTERITIS AND COLITIS: ICD-10-CM

## 2023-06-06 PROCEDURE — 99215 OFFICE O/P EST HI 40 MIN: CPT

## 2023-06-07 DIAGNOSIS — R11.2 NAUSEA WITH VOMITING, UNSPECIFIED: ICD-10-CM

## 2023-06-07 PROBLEM — K52.1 CHEMOTHERAPY-INDUCED DIARRHEA: Status: RESOLVED | Noted: 2022-12-28 | Resolved: 2023-06-07

## 2023-06-07 PROBLEM — R53.83 FATIGUE DUE TO TREATMENT: Status: RESOLVED | Noted: 2022-05-26 | Resolved: 2023-06-07

## 2023-06-07 PROBLEM — L27.1 HAND FOOT SYNDROME: Status: RESOLVED | Noted: 2022-12-28 | Resolved: 2023-06-07

## 2023-06-07 LAB
ALBUMIN SERPL ELPH-MCNC: 4.8 G/DL — SIGNIFICANT CHANGE UP (ref 3.3–5)
ALP SERPL-CCNC: 105 U/L — SIGNIFICANT CHANGE UP (ref 40–120)
ALT FLD-CCNC: 11 U/L — SIGNIFICANT CHANGE UP (ref 10–45)
ANION GAP SERPL CALC-SCNC: 18 MMOL/L — HIGH (ref 5–17)
AST SERPL-CCNC: 18 U/L — SIGNIFICANT CHANGE UP (ref 10–40)
BILIRUB SERPL-MCNC: 0.2 MG/DL — SIGNIFICANT CHANGE UP (ref 0.2–1.2)
BUN SERPL-MCNC: 10 MG/DL — SIGNIFICANT CHANGE UP (ref 7–23)
CALCIUM SERPL-MCNC: 10 MG/DL — SIGNIFICANT CHANGE UP (ref 8.4–10.5)
CANCER AG27-29 SERPL-ACNC: 10.1 U/ML — SIGNIFICANT CHANGE UP (ref 0–38.6)
CEA SERPL-MCNC: 2.5 NG/ML — SIGNIFICANT CHANGE UP (ref 0–3.8)
CHLORIDE SERPL-SCNC: 105 MMOL/L — SIGNIFICANT CHANGE UP (ref 96–108)
CO2 SERPL-SCNC: 22 MMOL/L — SIGNIFICANT CHANGE UP (ref 22–31)
CREAT SERPL-MCNC: 0.7 MG/DL — SIGNIFICANT CHANGE UP (ref 0.5–1.3)
EGFR: 105 ML/MIN/1.73M2 — SIGNIFICANT CHANGE UP
GLUCOSE SERPL-MCNC: 75 MG/DL — SIGNIFICANT CHANGE UP (ref 70–99)
HCT VFR BLD CALC: 37.3 % — SIGNIFICANT CHANGE UP (ref 34.5–45)
HGB BLD-MCNC: 11.5 G/DL — SIGNIFICANT CHANGE UP (ref 11.5–15.5)
MCHC RBC-ENTMCNC: 30.3 PG — SIGNIFICANT CHANGE UP (ref 27–34)
MCHC RBC-ENTMCNC: 30.8 GM/DL — LOW (ref 32–36)
MCV RBC AUTO: 98.4 FL — SIGNIFICANT CHANGE UP (ref 80–100)
PLATELET # BLD AUTO: 275 K/UL — SIGNIFICANT CHANGE UP (ref 150–400)
POTASSIUM SERPL-MCNC: 3.9 MMOL/L — SIGNIFICANT CHANGE UP (ref 3.5–5.3)
POTASSIUM SERPL-SCNC: 3.9 MMOL/L — SIGNIFICANT CHANGE UP (ref 3.5–5.3)
PROT SERPL-MCNC: 7 G/DL — SIGNIFICANT CHANGE UP (ref 6–8.3)
RBC # BLD: 3.79 M/UL — LOW (ref 3.8–5.2)
RBC # FLD: 13.2 % — SIGNIFICANT CHANGE UP (ref 10.3–14.5)
SODIUM SERPL-SCNC: 145 MMOL/L — SIGNIFICANT CHANGE UP (ref 135–145)
WBC # BLD: 6.31 K/UL — SIGNIFICANT CHANGE UP (ref 3.8–10.5)
WBC # FLD AUTO: 6.31 K/UL — SIGNIFICANT CHANGE UP (ref 3.8–10.5)

## 2023-06-07 NOTE — PHYSICAL EXAM
[Fully active, able to carry on all pre-disease performance without restriction] : Status 0 - Fully active, able to carry on all pre-disease performance without restriction [Normal] : affect appropriate [de-identified] : s/p right mastectomy with well healed incision and expander in place; no chest wall masses or lymphadenopathy [de-identified] : + dryness in palms of the hands and soles of the feet.

## 2023-06-07 NOTE — HISTORY OF PRESENT ILLNESS
[Disease: _____________________] : Disease: [unfilled] [T: ___] : T[unfilled] [N: ___] : N[unfilled] [M: ___] : M[unfilled] [AJCC Stage: ____] : AJCC Stage: [unfilled] [de-identified] : Right triple negative breast cancer diagnosed at the age of 51.\par 07/17/21 Mammogram and breast US showed a spiculated mass in the right breast at the 12 o'clock axis 5 cm FN. \par 09/17/21 Unilateral Mammogram and breast US showed a persistent spiculated mass in the right breast about 12 o'clock axis corresponding with a 1.5 x 1.0 x 0.8 irregular hypoechoic nodule with posterior shadowing. Also noted was a 1.3 x 0.6 x 1.2 cm hypoechoic nodule questionable irregularity 10 o'clock axis right breast 2 cm FN. Multiple other small nodules bilaterally suggesting complicated and simple cysts. Bilateral mild duct ectasia retroareolar region. BI-RADS 5.\par 10/19/21 Right breast 12 o'clock axis 3 cm FN core biopsy: ~0.9 cm invasive mammary carcinoma with apocrine features, moderately differentiated ER<1%, [place] 0% and HER-2 (-)\par   Right breast 10 o'clock axis 2 cm FN core biopsy: ~0.7 cm Invasive mammary carcinoma, moderately differentiated. 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   Lymphovascular invasion is identified. The invasive and in situ mammary carcinoma shows a ductal phenotype but diminished/loss of membranous staining. \par 11/11/21 [facility] Breast cancer stat panjcarletoel of 7 genes negative for pathogenic variants.\par 11/15/21 MRI Breast showed right upper central/slightly inner dominant enhancing mass measuring up to 3.3 cm and containing a biopsy clip, corresponding to one site of biopsy-proven malignancy. There is a 1.5 cm smaller irregular enhancing mass just inferior and lateral to the dominant aforementioned mass, corresponding to a second site of biopsy-proven malignancy. Small enhancing foci and non mass enhancement surrounding both masses, concerning for small satellite lesions and additional disease. There is 2.2 cm thick linear non mass enhancement more inferiorly in the right lateral 9:00 retroareolar location which extends to within 1.1 cm of the nipple. No MRI evidence of malignancy in the contralateral left breast. No lymphadenopathy. A 1.2 cm nonspecific T2 bright right hepatic lobe lesion which is incompletely visualized. Suggest dedicated liver MRI.\par 11/15/21 CT scan of C/A/P showed no imaging evidence of metastatic disease involving the chest, abdomen or pelvis. Two small cysts were noted in the right lobe of the liver but no suspicious lesions. Diffuse bladder wall thickening may indicate cystitis. \par 01/21/22 Right mastectomy and ALND by Dr. Cohen revealed a 2.1 cm IDC, SBR 6/9. Also noted was a 1.7 cm focus of DCIS, cribriform and solid types, intermediate nuclear grade with focal necrosis. Extensive lymphovascular invasion. 1/1 SLN positive with a 1.7 cm focus of disease. Right axillary lymph node dissection revealed 15/18 positive LN.\par She had immediate reconstruction with expander placement by Dr. Blane Ingram.\par 3/3/22 - 6/9/22 Adjuvant chemotherapy with dose dense Adriamycin (60 mg/m2) & Cytoxan (600 mg/m2) with Neulasta x 4 cycles followed by Taxol (175 mg/m2) x 4 cycles, all given every 2 weeks\par 7/24/22 - 8/15/22 Adjuvant radiation with 4240 cGy in 16 fractions with Dr. Moy\par 9/12/22 - 3/2023 Adjuvant capecitabine 1500 mg bid 7 days on and 7 days off every 28 days with 6 cycles given high risk disease with 16/19 LN positive\par 12/28/22 Start Adjuvant Zometa 4 mg IV every 6 months to be given for three years\par 4/21/23 Left mastectomy by Dr. Cohen. Bilateral internal mammary LN excision revealed a right internal mammary LN with metastatic adenocarcinoma, 0.3 cm\par 4/21/23 Bilateral SHAGGY flap reconstruction by Dr. Blane Ingram\par  [de-identified] : 2.1 cm IDC, SBR 6/9 with 16/19 LN positive, no extranodal extension, triple negative [de-identified] : Idalmis completed 6 cycles of capecitabine in early 3/23 with 1500 mg bid one week on and one week off.\par Since completing the chemotherapy her fatigue is improving and her hands and feet are improving. No more diarrhea\par She still has numbness in the tips of two of her fingers but not her toes.\par She has not had a period since before starting chemotherapy. \par She had dental clearance for Zometa and started 22 and will continue every 6 months.\par She had surgery 23 with left mastectomy and SHAGGY flap with Dr. Cohen and Dr. Ingram and is healing well, but a right internal mammary LN was positive for cancer\par She has had anxiety for many years and is on olanzapine for this with Psychiatrist Dr Navin Canales. Her psychiatrist started her on Klonopin 0.5 mg BID and venlafaxine 37.5 mg in AM and increased Zyprexa from 5 to 10, which has been helping her sleep at night.\par She is  with one son age 10.  She works for [employer] in [employer], working remotely. Her  has been gradually withdrawing from the family and although he is still involved with their son and takes him to school he is no longer living in the house.\par \par HEALTH MAINTENANCE:\par PCP: Addis Peres MD\par GYN: Royce Goldman MD\par Mammogram: 22 Left mammogram BI-RADS 2 Benign finding \par Pap smear: 22 negative for IEL\par Endometrial polypectomy: 21 Endometrial curetting showed endometrial epithelium with proliferative features. Fragments of endometrial poly and proliferative endometrium\par Colonoscopy: none, referred to see GI.\par Genetic testin21 [facility] Breast cancer stat panel of 7 genes negative for pathogenic variants\par

## 2023-06-14 ENCOUNTER — RESULT REVIEW (OUTPATIENT)
Age: 53
End: 2023-06-14

## 2023-07-11 ENCOUNTER — APPOINTMENT (OUTPATIENT)
Dept: MRI IMAGING | Facility: IMAGING CENTER | Age: 53
End: 2023-07-11
Payer: COMMERCIAL

## 2023-07-11 ENCOUNTER — OUTPATIENT (OUTPATIENT)
Dept: OUTPATIENT SERVICES | Facility: HOSPITAL | Age: 53
LOS: 1 days | End: 2023-07-11
Payer: COMMERCIAL

## 2023-07-11 ENCOUNTER — APPOINTMENT (OUTPATIENT)
Dept: CT IMAGING | Facility: IMAGING CENTER | Age: 53
End: 2023-07-11
Payer: COMMERCIAL

## 2023-07-11 DIAGNOSIS — Z90.11 ACQUIRED ABSENCE OF RIGHT BREAST AND NIPPLE: Chronic | ICD-10-CM

## 2023-07-11 DIAGNOSIS — Z98.890 OTHER SPECIFIED POSTPROCEDURAL STATES: Chronic | ICD-10-CM

## 2023-07-11 DIAGNOSIS — Z98.891 HISTORY OF UTERINE SCAR FROM PREVIOUS SURGERY: Chronic | ICD-10-CM

## 2023-07-11 DIAGNOSIS — Z00.8 ENCOUNTER FOR OTHER GENERAL EXAMINATION: ICD-10-CM

## 2023-07-11 PROCEDURE — C8908: CPT

## 2023-07-11 PROCEDURE — C8937: CPT

## 2023-07-11 PROCEDURE — 74177 CT ABD & PELVIS W/CONTRAST: CPT | Mod: 26

## 2023-07-11 PROCEDURE — 71260 CT THORAX DX C+: CPT | Mod: 26

## 2023-07-11 PROCEDURE — 77049 MRI BREAST C-+ W/CAD BI: CPT | Mod: 26

## 2023-07-11 PROCEDURE — A9585: CPT

## 2023-07-11 PROCEDURE — 74177 CT ABD & PELVIS W/CONTRAST: CPT

## 2023-07-11 PROCEDURE — 71260 CT THORAX DX C+: CPT

## 2023-08-25 ENCOUNTER — OUTPATIENT (OUTPATIENT)
Dept: OUTPATIENT SERVICES | Facility: HOSPITAL | Age: 53
LOS: 1 days | Discharge: HOME | End: 2023-08-25

## 2023-08-25 DIAGNOSIS — Z98.890 OTHER SPECIFIED POSTPROCEDURAL STATES: Chronic | ICD-10-CM

## 2023-08-25 DIAGNOSIS — C50.919 MALIGNANT NEOPLASM OF UNSPECIFIED SITE OF UNSPECIFIED FEMALE BREAST: ICD-10-CM

## 2023-08-25 DIAGNOSIS — Z90.11 ACQUIRED ABSENCE OF RIGHT BREAST AND NIPPLE: Chronic | ICD-10-CM

## 2023-08-25 DIAGNOSIS — Z98.891 HISTORY OF UTERINE SCAR FROM PREVIOUS SURGERY: Chronic | ICD-10-CM

## 2023-08-31 ENCOUNTER — OUTPATIENT (OUTPATIENT)
Dept: OUTPATIENT SERVICES | Facility: HOSPITAL | Age: 53
LOS: 1 days | End: 2023-08-31
Payer: COMMERCIAL

## 2023-08-31 VITALS
HEART RATE: 81 BPM | RESPIRATION RATE: 18 BRPM | HEIGHT: 62 IN | WEIGHT: 121.03 LBS | TEMPERATURE: 98 F | OXYGEN SATURATION: 96 % | DIASTOLIC BLOOD PRESSURE: 70 MMHG | SYSTOLIC BLOOD PRESSURE: 100 MMHG

## 2023-08-31 DIAGNOSIS — Z90.13 ACQUIRED ABSENCE OF BILATERAL BREASTS AND NIPPLES: ICD-10-CM

## 2023-08-31 DIAGNOSIS — Z98.890 OTHER SPECIFIED POSTPROCEDURAL STATES: Chronic | ICD-10-CM

## 2023-08-31 DIAGNOSIS — Z01.818 ENCOUNTER FOR OTHER PREPROCEDURAL EXAMINATION: ICD-10-CM

## 2023-08-31 DIAGNOSIS — Z98.891 HISTORY OF UTERINE SCAR FROM PREVIOUS SURGERY: Chronic | ICD-10-CM

## 2023-08-31 DIAGNOSIS — Z85.3 PERSONAL HISTORY OF MALIGNANT NEOPLASM OF BREAST: ICD-10-CM

## 2023-08-31 DIAGNOSIS — Z90.11 ACQUIRED ABSENCE OF RIGHT BREAST AND NIPPLE: Chronic | ICD-10-CM

## 2023-08-31 DIAGNOSIS — Z90.12 ACQUIRED ABSENCE OF LEFT BREAST AND NIPPLE: Chronic | ICD-10-CM

## 2023-08-31 DIAGNOSIS — C50.919 MALIGNANT NEOPLASM OF UNSPECIFIED SITE OF UNSPECIFIED FEMALE BREAST: ICD-10-CM

## 2023-08-31 LAB
ANION GAP SERPL CALC-SCNC: 17 MMOL/L — SIGNIFICANT CHANGE UP (ref 5–17)
BLD GP AB SCN SERPL QL: NEGATIVE — SIGNIFICANT CHANGE UP
BUN SERPL-MCNC: 15 MG/DL — SIGNIFICANT CHANGE UP (ref 7–23)
CALCIUM SERPL-MCNC: 9.3 MG/DL — SIGNIFICANT CHANGE UP (ref 8.4–10.5)
CHLORIDE SERPL-SCNC: 106 MMOL/L — SIGNIFICANT CHANGE UP (ref 96–108)
CO2 SERPL-SCNC: 19 MMOL/L — LOW (ref 22–31)
CREAT SERPL-MCNC: 0.8 MG/DL — SIGNIFICANT CHANGE UP (ref 0.5–1.3)
EGFR: 89 ML/MIN/1.73M2 — SIGNIFICANT CHANGE UP
GLUCOSE SERPL-MCNC: 84 MG/DL — SIGNIFICANT CHANGE UP (ref 70–99)
HCT VFR BLD CALC: 39.1 % — SIGNIFICANT CHANGE UP (ref 34.5–45)
HGB BLD-MCNC: 12.6 G/DL — SIGNIFICANT CHANGE UP (ref 11.5–15.5)
MCHC RBC-ENTMCNC: 29.7 PG — SIGNIFICANT CHANGE UP (ref 27–34)
MCHC RBC-ENTMCNC: 32.2 GM/DL — SIGNIFICANT CHANGE UP (ref 32–36)
MCV RBC AUTO: 92.2 FL — SIGNIFICANT CHANGE UP (ref 80–100)
NRBC # BLD: 0 /100 WBCS — SIGNIFICANT CHANGE UP (ref 0–0)
PLATELET # BLD AUTO: 223 K/UL — SIGNIFICANT CHANGE UP (ref 150–400)
POTASSIUM SERPL-MCNC: 3.8 MMOL/L — SIGNIFICANT CHANGE UP (ref 3.5–5.3)
POTASSIUM SERPL-SCNC: 3.8 MMOL/L — SIGNIFICANT CHANGE UP (ref 3.5–5.3)
RBC # BLD: 4.24 M/UL — SIGNIFICANT CHANGE UP (ref 3.8–5.2)
RBC # FLD: 15.9 % — HIGH (ref 10.3–14.5)
RH IG SCN BLD-IMP: POSITIVE — SIGNIFICANT CHANGE UP
SODIUM SERPL-SCNC: 142 MMOL/L — SIGNIFICANT CHANGE UP (ref 135–145)
WBC # BLD: 9.77 K/UL — SIGNIFICANT CHANGE UP (ref 3.8–10.5)
WBC # FLD AUTO: 9.77 K/UL — SIGNIFICANT CHANGE UP (ref 3.8–10.5)

## 2023-08-31 PROCEDURE — 85027 COMPLETE CBC AUTOMATED: CPT

## 2023-08-31 PROCEDURE — 86900 BLOOD TYPING SEROLOGIC ABO: CPT

## 2023-08-31 PROCEDURE — 80048 BASIC METABOLIC PNL TOTAL CA: CPT

## 2023-08-31 PROCEDURE — 86901 BLOOD TYPING SEROLOGIC RH(D): CPT

## 2023-08-31 PROCEDURE — 86850 RBC ANTIBODY SCREEN: CPT

## 2023-08-31 PROCEDURE — G0463: CPT

## 2023-08-31 RX ORDER — OLANZAPINE 15 MG/1
1 TABLET, FILM COATED ORAL
Qty: 0 | Refills: 0 | DISCHARGE

## 2023-08-31 RX ORDER — SODIUM CHLORIDE 9 MG/ML
1000 INJECTION, SOLUTION INTRAVENOUS
Refills: 0 | Status: DISCONTINUED | OUTPATIENT
Start: 2023-09-20 | End: 2023-10-04

## 2023-08-31 RX ORDER — SODIUM CHLORIDE 9 MG/ML
3 INJECTION INTRAMUSCULAR; INTRAVENOUS; SUBCUTANEOUS EVERY 8 HOURS
Refills: 0 | Status: DISCONTINUED | OUTPATIENT
Start: 2023-09-20 | End: 2023-10-04

## 2023-08-31 RX ORDER — CLONAZEPAM 1 MG
1 TABLET ORAL
Qty: 0 | Refills: 0 | DISCHARGE

## 2023-08-31 RX ORDER — ALPRAZOLAM 0.25 MG
1 TABLET ORAL
Qty: 0 | Refills: 0 | DISCHARGE

## 2023-08-31 NOTE — H&P PST ADULT - NSICDXPASTSURGICALHX_GEN_ALL_CORE_FT
PAST SURGICAL HISTORY:  H/O left mastectomy     History of  section 2012    History of right total mastectomy     S/P breast biopsy right    Status post D&C

## 2023-08-31 NOTE — H&P PST ADULT - ASSESSMENT
DASI Score: 8  DASI Activity: Walks 3x weekly  able to go up one flight of stairs or walk 1-2 blocks with out difficulty  Loose or removable teeth: denies

## 2023-08-31 NOTE — H&P PST ADULT - HISTORY OF PRESENT ILLNESS
This is a 53 year old female diagnosed with breast cancer at age 51. Right mastectomy (1/2022) completed chemotherapy 9/22. S/P left mastectomy w/ bilateral walter flap reconstruction 4/21/23. Presenting to PST for scheduled Bilateral breast revision reconstruction with fat grafting on 9/20/23 with Dr. Ingram This is a 53 year old female diagnosed with breast cancer at age 51. Right mastectomy (1/2022) adjuvant chemotherapy completed 9/22. S/P left mastectomy w/ bilateral walter flap reconstruction 4/21/23. Presenting to PST for scheduled Bilateral breast revision reconstruction with fat grafting on 9/20/23 with Dr. Ingram

## 2023-08-31 NOTE — H&P PST ADULT - ATTENDING COMMENTS
Patient for bilateral breast revision and fat grafting. Plan reviewed., Markings done. Consent signed.

## 2023-09-05 ENCOUNTER — APPOINTMENT (OUTPATIENT)
Dept: HEMATOLOGY ONCOLOGY | Facility: CLINIC | Age: 53
End: 2023-09-05
Payer: COMMERCIAL

## 2023-09-05 VITALS
SYSTOLIC BLOOD PRESSURE: 110 MMHG | HEART RATE: 73 BPM | WEIGHT: 118.05 LBS | RESPIRATION RATE: 15 BRPM | BODY MASS INDEX: 21.59 KG/M2 | TEMPERATURE: 97.3 F | OXYGEN SATURATION: 99 % | DIASTOLIC BLOOD PRESSURE: 76 MMHG

## 2023-09-05 PROCEDURE — 99214 OFFICE O/P EST MOD 30 MIN: CPT

## 2023-09-05 RX ORDER — ALPRAZOLAM 0.5 MG/1
0.5 TABLET ORAL
Refills: 0 | Status: DISCONTINUED | COMMUNITY
End: 2023-09-05

## 2023-09-05 NOTE — HISTORY OF PRESENT ILLNESS
[Disease: _____________________] : Disease: [unfilled] [T: ___] : T[unfilled] [N: ___] : N[unfilled] [M: ___] : M[unfilled] [AJCC Stage: ____] : AJCC Stage: [unfilled] [de-identified] : Right triple negative breast cancer diagnosed at the age of 51. 07/17/21 Mammogram and breast US showed a spiculated mass in the right breast at the 12 o'clock axis 5 cm FN.  09/17/21 Unilateral Mammogram and breast US showed a persistent spiculated mass in the right breast about 12 o'clock axis corresponding with a 1.5 x 1.0 x 0.8 irregular hypoechoic nodule with posterior shadowing. Also noted was a 1.3 x 0.6 x 1.2 cm hypoechoic nodule questionable irregularity 10 o'clock axis right breast 2 cm FN. Multiple other small nodules bilaterally suggesting complicated and simple cysts. Bilateral mild duct ectasia retroareolar region. BI-RADS 5. 10/19/21 Right breast 12 o'clock axis 3 cm FN core biopsy: ~0.9 cm invasive mammary carcinoma with apocrine features, moderately differentiated ER<1%, [place] 0% and HER-2 (-)   Right breast 10 o'clock axis 2 cm FN core biopsy: ~0.7 cm Invasive mammary carcinoma, moderately differentiated. 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   Lymphovascular invasion is identified. The invasive and in situ mammary carcinoma shows a ductal phenotype but diminished/loss of membranous staining.  11/11/21 [facility] Breast cancer stat panjcarletoel of 7 genes negative for pathogenic variants. 11/15/21 MRI Breast showed right upper central/slightly inner dominant enhancing mass measuring up to 3.3 cm and containing a biopsy clip, corresponding to one site of biopsy-proven malignancy. There is a 1.5 cm smaller irregular enhancing mass just inferior and lateral to the dominant aforementioned mass, corresponding to a second site of biopsy-proven malignancy. Small enhancing foci and non mass enhancement surrounding both masses, concerning for small satellite lesions and additional disease. There is 2.2 cm thick linear non mass enhancement more inferiorly in the right lateral 9:00 retroareolar location which extends to within 1.1 cm of the nipple. No MRI evidence of malignancy in the contralateral left breast. No lymphadenopathy. A 1.2 cm nonspecific T2 bright right hepatic lobe lesion which is incompletely visualized. Suggest dedicated liver MRI. 11/15/21 CT scan of C/A/P showed no imaging evidence of metastatic disease involving the chest, abdomen or pelvis. Two small cysts were noted in the right lobe of the liver but no suspicious lesions. Diffuse bladder wall thickening may indicate cystitis.  01/21/22 Right mastectomy and ALND by Dr. Cohen revealed a 2.1 cm IDC, SBR 6/9. Also noted was a 1.7 cm focus of DCIS, cribriform and solid types, intermediate nuclear grade with focal necrosis. Extensive lymphovascular invasion. 1/1 SLN positive with a 1.7 cm focus of disease. Right axillary lymph node dissection revealed 15/18 positive LN. She had immediate reconstruction with expander placement by Dr. Blane Ingram. 3/3/22 - 6/9/22 Adjuvant chemotherapy with dose dense Adriamycin (60 mg/m2) & Cytoxan (600 mg/m2) with Neulasta x 4 cycles followed by Taxol (175 mg/m2) x 4 cycles, all given every 2 weeks 7/24/22 - 8/15/22 Adjuvant radiation with 4240 cGy in 16 fractions with Dr. Moy 9/12/22 - 3/2023 Adjuvant capecitabine 1500 mg bid 7 days on and 7 days off every 28 days with 6 cycles given high risk disease with 16/19 LN positive 12/28/22 Start Adjuvant Zometa 4 mg IV every 6 months to be given for three years 4/21/23 Left mastectomy by Dr. Cohen. Bilateral internal mammary LN excision revealed a right internal mammary LN with metastatic adenocarcinoma, 0.3 cm 4/21/23 Bilateral SHAGGY flap reconstruction by Dr. Blane Ingram 3/2023 Completed 6 months of Xeloda. [de-identified] : 2.1 cm IDC, SBR 6/9 with 16/19 LN positive, no extranodal extension, triple negative [de-identified] : Idalmis presents to the office for an evaluation s/p completing 6 cycles of capecitabine in early 3/23 with 1500 mg bid one week on and one week off. She is overall doing well aside from the occasional fatigue, which comes and goes. She attributes her fatigue to lack of sleep at nights to due anxiety and stressing over things. The discoloration in her hands and feet and diarrhea resolves as soon as she was done with it. She still has numbness in the tips of two of her fingers but not her toes. She has not had a period since before starting chemotherapy.  She had dental clearance for Zometa and started 22 and will continue every 6 months. On 23 CT scan of the C/A/P negative for malignancy She had surgery 23 with left mastectomy and SHAGGY flap with Dr. Cohen and Dr. Ingram and is healing well, but a right internal mammary LN was positive for cancer She is scheduled for more revision surgery on 23  She has had anxiety for many years and is on olanzapine for this with Psychiatrist Dr Navin Canales. Her psychiatrist started her on Klonopin 0.5 mg BID and venlafaxine 37.5 mg in AM and increased Zyprexa from 5 to 10, which has been helping her sleep at night. She is  with one son age 10.  She works for [employer] in [employer], working remotely. Her  has been gradually withdrawing from the family and although he is still involved with their son and takes him to school he is no longer living in the house.  HEALTH MAINTENANCE: PCP: Addis Peres MD GYN: Royce Goldman MD Mammogram: 22 Left mammogram BI-RADS 2 Benign finding  MRI of the breast: 23 There has been interval bilateral mastectomy with SHAGGY flap reconstruction. There are postsurgical changes of the bilateral neobreasts including evolving areas of fat necrosis in the left breast for which a  six-month follow-up breast MRI is recommended to ascertain stability and/or resolution. BI-RADS 3 Pap smear: 22 negative for IEL Endometrial polypectomy: 21 Endometrial curetting showed endometrial epithelium with proliferative features. Fragments of endometrial poly and proliferative endometrium Colonoscopy: none, referred to see GI. Genetic testin21 [facility] Breast cancer stat panel of 7 genes negative for pathogenic variants

## 2023-09-19 ENCOUNTER — TRANSCRIPTION ENCOUNTER (OUTPATIENT)
Age: 53
End: 2023-09-19

## 2023-09-20 ENCOUNTER — TRANSCRIPTION ENCOUNTER (OUTPATIENT)
Age: 53
End: 2023-09-20

## 2023-09-20 ENCOUNTER — OUTPATIENT (OUTPATIENT)
Dept: OUTPATIENT SERVICES | Facility: HOSPITAL | Age: 53
LOS: 1 days | End: 2023-09-20
Payer: COMMERCIAL

## 2023-09-20 VITALS
OXYGEN SATURATION: 100 % | SYSTOLIC BLOOD PRESSURE: 107 MMHG | RESPIRATION RATE: 14 BRPM | DIASTOLIC BLOOD PRESSURE: 57 MMHG | HEART RATE: 60 BPM

## 2023-09-20 VITALS
OXYGEN SATURATION: 99 % | TEMPERATURE: 97 F | DIASTOLIC BLOOD PRESSURE: 72 MMHG | WEIGHT: 121.03 LBS | HEART RATE: 69 BPM | SYSTOLIC BLOOD PRESSURE: 102 MMHG | HEIGHT: 62 IN | RESPIRATION RATE: 16 BRPM

## 2023-09-20 DIAGNOSIS — Z98.891 HISTORY OF UTERINE SCAR FROM PREVIOUS SURGERY: Chronic | ICD-10-CM

## 2023-09-20 DIAGNOSIS — Z90.11 ACQUIRED ABSENCE OF RIGHT BREAST AND NIPPLE: Chronic | ICD-10-CM

## 2023-09-20 DIAGNOSIS — Z90.12 ACQUIRED ABSENCE OF LEFT BREAST AND NIPPLE: Chronic | ICD-10-CM

## 2023-09-20 DIAGNOSIS — Z98.890 OTHER SPECIFIED POSTPROCEDURAL STATES: Chronic | ICD-10-CM

## 2023-09-20 DIAGNOSIS — Z90.13 ACQUIRED ABSENCE OF BILATERAL BREASTS AND NIPPLES: ICD-10-CM

## 2023-09-20 DIAGNOSIS — Z85.3 PERSONAL HISTORY OF MALIGNANT NEOPLASM OF BREAST: ICD-10-CM

## 2023-09-20 LAB — RH IG SCN BLD-IMP: POSITIVE — SIGNIFICANT CHANGE UP

## 2023-09-20 PROCEDURE — 19380 REVJ RECONSTRUCTED BREAST: CPT | Mod: 50

## 2023-09-20 PROCEDURE — 15772 GRFG AUTOL FAT LIPO EA ADDL: CPT

## 2023-09-20 PROCEDURE — 15771 GRFG AUTOL FAT LIPO 50 CC/<: CPT

## 2023-09-20 PROCEDURE — 88304 TISSUE EXAM BY PATHOLOGIST: CPT

## 2023-09-20 PROCEDURE — 88304 TISSUE EXAM BY PATHOLOGIST: CPT | Mod: 26

## 2023-09-20 RX ORDER — APREPITANT 80 MG/1
40 CAPSULE ORAL ONCE
Refills: 0 | Status: COMPLETED | OUTPATIENT
Start: 2023-09-20 | End: 2023-09-20

## 2023-09-20 RX ORDER — OLANZAPINE 15 MG/1
1 TABLET, FILM COATED ORAL
Refills: 0 | DISCHARGE

## 2023-09-20 RX ORDER — LIDOCAINE HCL 20 MG/ML
0.2 VIAL (ML) INJECTION ONCE
Refills: 0 | Status: COMPLETED | OUTPATIENT
Start: 2023-09-20 | End: 2023-09-20

## 2023-09-20 RX ORDER — VENLAFAXINE HCL 75 MG
1 CAPSULE, EXT RELEASE 24 HR ORAL
Refills: 0 | DISCHARGE

## 2023-09-20 RX ORDER — CLONAZEPAM 1 MG
1 TABLET ORAL
Refills: 0 | DISCHARGE

## 2023-09-20 RX ORDER — CHLORHEXIDINE GLUCONATE 213 G/1000ML
1 SOLUTION TOPICAL ONCE
Refills: 0 | Status: COMPLETED | OUTPATIENT
Start: 2023-09-20 | End: 2023-09-20

## 2023-09-20 RX ORDER — CEFAZOLIN SODIUM 1 G
2000 VIAL (EA) INJECTION ONCE
Refills: 0 | Status: COMPLETED | OUTPATIENT
Start: 2023-09-20 | End: 2023-09-20

## 2023-09-20 RX ADMIN — SODIUM CHLORIDE 100 MILLILITER(S): 9 INJECTION, SOLUTION INTRAVENOUS at 06:45

## 2023-09-20 RX ADMIN — SODIUM CHLORIDE 3 MILLILITER(S): 9 INJECTION INTRAMUSCULAR; INTRAVENOUS; SUBCUTANEOUS at 06:48

## 2023-09-20 RX ADMIN — CHLORHEXIDINE GLUCONATE 1 APPLICATION(S): 213 SOLUTION TOPICAL at 06:45

## 2023-09-20 RX ADMIN — APREPITANT 40 MILLIGRAM(S): 80 CAPSULE ORAL at 06:45

## 2023-09-20 NOTE — ASU PATIENT PROFILE, ADULT - FALL HARM RISK - UNIVERSAL INTERVENTIONS
Bed in lowest position, wheels locked, appropriate side rails in place/Call bell, personal items and telephone in reach/Instruct patient to call for assistance before getting out of bed or chair/Non-slip footwear when patient is out of bed/[place] to call system/Physically safe environment - no spills, clutter or unnecessary equipment/Purposeful Proactive Rounding/Room/bathroom lighting operational, light cord in reach

## 2023-09-20 NOTE — ASU PATIENT PROFILE, ADULT - NS PRO MODE OF ARRIVAL
[Normal Thyroid] : the thyroid was normal [Normal Breath Sounds] : Normal breath sounds [Normal Heart Sounds] : normal heart sounds [Normal Rate and Rhythm] : normal rate and rhythm [de-identified] : healthy in appearance [de-identified] : 12 mm subcutaneous mass in the parietal region of the scalp just to the left of the midline is firm, nontender, and mobile.  Some thickening of the overlying skin [de-identified] : no adenopathy ambulatory

## 2023-09-20 NOTE — ASU DISCHARGE PLAN (ADULT/PEDIATRIC) - NURSING INSTRUCTIONS
Next dose of tylenol at/after__02pm__. Do not exceed 4000mg in a 24hour  period. Every 6hours as needed.

## 2023-09-20 NOTE — ASU DISCHARGE PLAN (ADULT/PEDIATRIC) - B. DRINK ALCOHOL, BEER, OR WINE
Statement Selected Detail Level: Zone Quality 410: Psoriasis Clinical Response To Oral Systemic Or Biologic Medications: Patient has been on biologic or system therapy for less than 6 months Quality 337: Tuberculosis Prevention For Psoriasis And Psoriatic Arthritis Patients On A Biological Immune Response Modifier: Patient has a documented negative TB screening prior to treatment.

## 2023-09-20 NOTE — BRIEF OPERATIVE NOTE - NSICDXBRIEFPROCEDURE_GEN_ALL_CORE_FT
PROCEDURES:  Breast revision surgery 20-Sep-2023 09:53:39  Enio Demarco  Fat grafting 20-Sep-2023 09:53:55  Enio Demarco

## 2023-09-20 NOTE — ASU PATIENT PROFILE, ADULT - VISION (WITH CORRECTIVE LENSES IF THE PATIENT USUALLY WEARS THEM):
Spoke with mom    Normal vision: sees adequately in most situations; can see medication labels, newsprint

## 2023-09-20 NOTE — ASU DISCHARGE PLAN (ADULT/PEDIATRIC) - CARE PROVIDER_API CALL
Blane Ingram  Plastic Surgery  [address], [address]  [place] [address]  Phone: [phone]  Fax: [phone]  Follow Up Time: 1 week

## 2023-09-27 LAB — SURGICAL PATHOLOGY STUDY: SIGNIFICANT CHANGE UP

## 2023-10-01 PROBLEM — Z92.3 HISTORY OF RADIATION THERAPY: Status: RESOLVED | Noted: 2022-07-25 | Resolved: 2023-10-01

## 2023-10-01 PROBLEM — R87.810 CERVICAL HIGH RISK HUMAN PAPILLOMAVIRUS (HPV) DNA TEST POSITIVE: Status: RESOLVED | Noted: 2021-07-30 | Resolved: 2023-10-01

## 2023-11-09 ENCOUNTER — OUTPATIENT (OUTPATIENT)
Dept: OUTPATIENT SERVICES | Facility: HOSPITAL | Age: 53
LOS: 1 days | Discharge: HOME | End: 2023-11-09

## 2023-11-09 DIAGNOSIS — Z98.891 HISTORY OF UTERINE SCAR FROM PREVIOUS SURGERY: Chronic | ICD-10-CM

## 2023-11-09 DIAGNOSIS — Z98.890 OTHER SPECIFIED POSTPROCEDURAL STATES: Chronic | ICD-10-CM

## 2023-11-09 DIAGNOSIS — Z90.11 ACQUIRED ABSENCE OF RIGHT BREAST AND NIPPLE: Chronic | ICD-10-CM

## 2023-11-09 DIAGNOSIS — Z90.12 ACQUIRED ABSENCE OF LEFT BREAST AND NIPPLE: Chronic | ICD-10-CM

## 2023-11-09 DIAGNOSIS — C50.919 MALIGNANT NEOPLASM OF UNSPECIFIED SITE OF UNSPECIFIED FEMALE BREAST: ICD-10-CM

## 2023-11-14 ENCOUNTER — APPOINTMENT (OUTPATIENT)
Dept: OBGYN | Facility: CLINIC | Age: 53
End: 2023-11-14
Payer: COMMERCIAL

## 2023-11-14 VITALS
DIASTOLIC BLOOD PRESSURE: 71 MMHG | SYSTOLIC BLOOD PRESSURE: 102 MMHG | BODY MASS INDEX: 20.43 KG/M2 | HEIGHT: 62 IN | WEIGHT: 111 LBS

## 2023-11-14 DIAGNOSIS — B97.7 PAPILLOMAVIRUS AS THE CAUSE OF DISEASES CLASSIFIED ELSEWHERE: ICD-10-CM

## 2023-11-14 DIAGNOSIS — Z01.419 ENCOUNTER FOR GYNECOLOGICAL EXAMINATION (GENERAL) (ROUTINE) W/OUT ABNORMAL FINDINGS: ICD-10-CM

## 2023-11-14 PROCEDURE — 99396 PREV VISIT EST AGE 40-64: CPT

## 2023-11-16 LAB
C TRACH RRNA SPEC QL NAA+PROBE: NOT DETECTED
HPV 16 E6+E7 MRNA CVX QL NAA+PROBE: NOT DETECTED
HPV HIGH+LOW RISK DNA PNL CVX: NOT DETECTED
HPV HIGH+LOW RISK DNA PNL CVX: NOT DETECTED
HPV18+45 E6+E7 MRNA CVX QL NAA+PROBE: NOT DETECTED
N GONORRHOEA RRNA SPEC QL NAA+PROBE: NOT DETECTED
SOURCE AMPLIFICATION: NORMAL

## 2023-11-18 LAB — CYTOLOGY CVX/VAG DOC THIN PREP: ABNORMAL

## 2023-11-28 ENCOUNTER — RESULT REVIEW (OUTPATIENT)
Age: 53
End: 2023-11-28

## 2023-11-28 ENCOUNTER — APPOINTMENT (OUTPATIENT)
Dept: INFUSION THERAPY | Facility: HOSPITAL | Age: 53
End: 2023-11-28

## 2023-11-28 ENCOUNTER — APPOINTMENT (OUTPATIENT)
Dept: HEMATOLOGY ONCOLOGY | Facility: CLINIC | Age: 53
End: 2023-11-28
Payer: COMMERCIAL

## 2023-11-28 VITALS
WEIGHT: 111.53 LBS | DIASTOLIC BLOOD PRESSURE: 67 MMHG | SYSTOLIC BLOOD PRESSURE: 99 MMHG | RESPIRATION RATE: 16 BRPM | BODY MASS INDEX: 20.52 KG/M2 | OXYGEN SATURATION: 99 % | HEIGHT: 62 IN | TEMPERATURE: 97.6 F | HEART RATE: 77 BPM

## 2023-11-28 LAB
ALBUMIN SERPL ELPH-MCNC: 4.5 G/DL — SIGNIFICANT CHANGE UP (ref 3.3–5)
ALBUMIN SERPL ELPH-MCNC: 4.5 G/DL — SIGNIFICANT CHANGE UP (ref 3.3–5)
ALP SERPL-CCNC: 87 U/L — SIGNIFICANT CHANGE UP (ref 40–120)
ALP SERPL-CCNC: 87 U/L — SIGNIFICANT CHANGE UP (ref 40–120)
ALT FLD-CCNC: <5 U/L — LOW (ref 10–45)
ALT FLD-CCNC: <5 U/L — LOW (ref 10–45)
ANION GAP SERPL CALC-SCNC: 11 MMOL/L — SIGNIFICANT CHANGE UP (ref 5–17)
ANION GAP SERPL CALC-SCNC: 11 MMOL/L — SIGNIFICANT CHANGE UP (ref 5–17)
AST SERPL-CCNC: 22 U/L — SIGNIFICANT CHANGE UP (ref 10–40)
AST SERPL-CCNC: 22 U/L — SIGNIFICANT CHANGE UP (ref 10–40)
BASOPHILS # BLD AUTO: 0.03 K/UL — SIGNIFICANT CHANGE UP (ref 0–0.2)
BASOPHILS # BLD AUTO: 0.03 K/UL — SIGNIFICANT CHANGE UP (ref 0–0.2)
BASOPHILS NFR BLD AUTO: 0.4 % — SIGNIFICANT CHANGE UP (ref 0–2)
BASOPHILS NFR BLD AUTO: 0.4 % — SIGNIFICANT CHANGE UP (ref 0–2)
BILIRUB SERPL-MCNC: 0.3 MG/DL — SIGNIFICANT CHANGE UP (ref 0.2–1.2)
BILIRUB SERPL-MCNC: 0.3 MG/DL — SIGNIFICANT CHANGE UP (ref 0.2–1.2)
BUN SERPL-MCNC: 14 MG/DL — SIGNIFICANT CHANGE UP (ref 7–23)
BUN SERPL-MCNC: 14 MG/DL — SIGNIFICANT CHANGE UP (ref 7–23)
CALCIUM SERPL-MCNC: 9.4 MG/DL — SIGNIFICANT CHANGE UP (ref 8.4–10.5)
CALCIUM SERPL-MCNC: 9.4 MG/DL — SIGNIFICANT CHANGE UP (ref 8.4–10.5)
CHLORIDE SERPL-SCNC: 108 MMOL/L — SIGNIFICANT CHANGE UP (ref 96–108)
CHLORIDE SERPL-SCNC: 108 MMOL/L — SIGNIFICANT CHANGE UP (ref 96–108)
CO2 SERPL-SCNC: 24 MMOL/L — SIGNIFICANT CHANGE UP (ref 22–31)
CO2 SERPL-SCNC: 24 MMOL/L — SIGNIFICANT CHANGE UP (ref 22–31)
CREAT SERPL-MCNC: 0.64 MG/DL — SIGNIFICANT CHANGE UP (ref 0.5–1.3)
CREAT SERPL-MCNC: 0.64 MG/DL — SIGNIFICANT CHANGE UP (ref 0.5–1.3)
EGFR: 107 ML/MIN/1.73M2 — SIGNIFICANT CHANGE UP
EGFR: 107 ML/MIN/1.73M2 — SIGNIFICANT CHANGE UP
EOSINOPHIL # BLD AUTO: 0.1 K/UL — SIGNIFICANT CHANGE UP (ref 0–0.5)
EOSINOPHIL # BLD AUTO: 0.1 K/UL — SIGNIFICANT CHANGE UP (ref 0–0.5)
EOSINOPHIL NFR BLD AUTO: 1.4 % — SIGNIFICANT CHANGE UP (ref 0–6)
EOSINOPHIL NFR BLD AUTO: 1.4 % — SIGNIFICANT CHANGE UP (ref 0–6)
GLUCOSE SERPL-MCNC: 105 MG/DL — HIGH (ref 70–99)
GLUCOSE SERPL-MCNC: 105 MG/DL — HIGH (ref 70–99)
HCT VFR BLD CALC: 39.7 % — SIGNIFICANT CHANGE UP (ref 34.5–45)
HCT VFR BLD CALC: 39.7 % — SIGNIFICANT CHANGE UP (ref 34.5–45)
HGB BLD-MCNC: 13 G/DL — SIGNIFICANT CHANGE UP (ref 11.5–15.5)
HGB BLD-MCNC: 13 G/DL — SIGNIFICANT CHANGE UP (ref 11.5–15.5)
IMM GRANULOCYTES NFR BLD AUTO: 0.4 % — SIGNIFICANT CHANGE UP (ref 0–0.9)
IMM GRANULOCYTES NFR BLD AUTO: 0.4 % — SIGNIFICANT CHANGE UP (ref 0–0.9)
LYMPHOCYTES # BLD AUTO: 0.93 K/UL — LOW (ref 1–3.3)
LYMPHOCYTES # BLD AUTO: 0.93 K/UL — LOW (ref 1–3.3)
LYMPHOCYTES # BLD AUTO: 12.6 % — LOW (ref 13–44)
LYMPHOCYTES # BLD AUTO: 12.6 % — LOW (ref 13–44)
MCHC RBC-ENTMCNC: 31.9 PG — SIGNIFICANT CHANGE UP (ref 27–34)
MCHC RBC-ENTMCNC: 31.9 PG — SIGNIFICANT CHANGE UP (ref 27–34)
MCHC RBC-ENTMCNC: 32.7 G/DL — SIGNIFICANT CHANGE UP (ref 32–36)
MCHC RBC-ENTMCNC: 32.7 G/DL — SIGNIFICANT CHANGE UP (ref 32–36)
MCV RBC AUTO: 97.5 FL — SIGNIFICANT CHANGE UP (ref 80–100)
MCV RBC AUTO: 97.5 FL — SIGNIFICANT CHANGE UP (ref 80–100)
MONOCYTES # BLD AUTO: 0.4 K/UL — SIGNIFICANT CHANGE UP (ref 0–0.9)
MONOCYTES # BLD AUTO: 0.4 K/UL — SIGNIFICANT CHANGE UP (ref 0–0.9)
MONOCYTES NFR BLD AUTO: 5.4 % — SIGNIFICANT CHANGE UP (ref 2–14)
MONOCYTES NFR BLD AUTO: 5.4 % — SIGNIFICANT CHANGE UP (ref 2–14)
NEUTROPHILS # BLD AUTO: 5.9 K/UL — SIGNIFICANT CHANGE UP (ref 1.8–7.4)
NEUTROPHILS # BLD AUTO: 5.9 K/UL — SIGNIFICANT CHANGE UP (ref 1.8–7.4)
NEUTROPHILS NFR BLD AUTO: 79.8 % — HIGH (ref 43–77)
NEUTROPHILS NFR BLD AUTO: 79.8 % — HIGH (ref 43–77)
NRBC # BLD: 0 /100 WBCS — SIGNIFICANT CHANGE UP (ref 0–0)
NRBC # BLD: 0 /100 WBCS — SIGNIFICANT CHANGE UP (ref 0–0)
PLATELET # BLD AUTO: 233 K/UL — SIGNIFICANT CHANGE UP (ref 150–400)
PLATELET # BLD AUTO: 233 K/UL — SIGNIFICANT CHANGE UP (ref 150–400)
POTASSIUM SERPL-MCNC: 4.4 MMOL/L — SIGNIFICANT CHANGE UP (ref 3.5–5.3)
POTASSIUM SERPL-MCNC: 4.4 MMOL/L — SIGNIFICANT CHANGE UP (ref 3.5–5.3)
POTASSIUM SERPL-SCNC: 4.4 MMOL/L — SIGNIFICANT CHANGE UP (ref 3.5–5.3)
POTASSIUM SERPL-SCNC: 4.4 MMOL/L — SIGNIFICANT CHANGE UP (ref 3.5–5.3)
PROT SERPL-MCNC: 7.3 G/DL — SIGNIFICANT CHANGE UP (ref 6–8.3)
PROT SERPL-MCNC: 7.3 G/DL — SIGNIFICANT CHANGE UP (ref 6–8.3)
RBC # BLD: 4.07 M/UL — SIGNIFICANT CHANGE UP (ref 3.8–5.2)
RBC # BLD: 4.07 M/UL — SIGNIFICANT CHANGE UP (ref 3.8–5.2)
RBC # FLD: 12.2 % — SIGNIFICANT CHANGE UP (ref 10.3–14.5)
RBC # FLD: 12.2 % — SIGNIFICANT CHANGE UP (ref 10.3–14.5)
SODIUM SERPL-SCNC: 142 MMOL/L — SIGNIFICANT CHANGE UP (ref 135–145)
SODIUM SERPL-SCNC: 142 MMOL/L — SIGNIFICANT CHANGE UP (ref 135–145)
WBC # BLD: 7.39 K/UL — SIGNIFICANT CHANGE UP (ref 3.8–10.5)
WBC # BLD: 7.39 K/UL — SIGNIFICANT CHANGE UP (ref 3.8–10.5)
WBC # FLD AUTO: 7.39 K/UL — SIGNIFICANT CHANGE UP (ref 3.8–10.5)
WBC # FLD AUTO: 7.39 K/UL — SIGNIFICANT CHANGE UP (ref 3.8–10.5)

## 2023-11-28 PROCEDURE — 99214 OFFICE O/P EST MOD 30 MIN: CPT

## 2023-11-28 RX ORDER — OLANZAPINE 10 MG/1
10 TABLET ORAL
Refills: 0 | Status: ACTIVE | COMMUNITY
Start: 2022-04-21

## 2023-11-28 RX ORDER — VENLAFAXINE HYDROCHLORIDE 37.5 MG/1
37.5 CAPSULE, EXTENDED RELEASE ORAL DAILY
Refills: 0 | Status: ACTIVE | COMMUNITY
Start: 2022-04-21

## 2023-11-28 RX ORDER — CLONAZEPAM 1 MG/1
1 TABLET ORAL
Refills: 0 | Status: ACTIVE | COMMUNITY
Start: 2022-06-30

## 2023-11-29 LAB
CANCER AG27-29 SERPL-ACNC: 10.3 U/ML — SIGNIFICANT CHANGE UP (ref 0–38.6)
CANCER AG27-29 SERPL-ACNC: 10.3 U/ML — SIGNIFICANT CHANGE UP (ref 0–38.6)
CEA SERPL-MCNC: 1.9 NG/ML — SIGNIFICANT CHANGE UP (ref 0–3.8)
CEA SERPL-MCNC: 1.9 NG/ML — SIGNIFICANT CHANGE UP (ref 0–3.8)

## 2024-01-10 NOTE — PHYSICAL EXAM
[Fully active, able to carry on all pre-disease performance without restriction] : Status 0 - Fully active, able to carry on all pre-disease performance without restriction [Normal] : normal appearance, no rash, nodules, vesicles, ulcers, erythema [de-identified] : s/p right mastectomy with well healed incision and expander in place; no chest wall masses or lymphadenopathy. Left breast + fat necrosis as noted in the MRI Yes

## 2024-01-12 ENCOUNTER — APPOINTMENT (OUTPATIENT)
Dept: MRI IMAGING | Facility: IMAGING CENTER | Age: 54
End: 2024-01-12
Payer: COMMERCIAL

## 2024-01-12 ENCOUNTER — OUTPATIENT (OUTPATIENT)
Dept: OUTPATIENT SERVICES | Facility: HOSPITAL | Age: 54
LOS: 1 days | End: 2024-01-12
Payer: COMMERCIAL

## 2024-01-12 DIAGNOSIS — Z90.11 ACQUIRED ABSENCE OF RIGHT BREAST AND NIPPLE: Chronic | ICD-10-CM

## 2024-01-12 DIAGNOSIS — Z90.12 ACQUIRED ABSENCE OF LEFT BREAST AND NIPPLE: Chronic | ICD-10-CM

## 2024-01-12 DIAGNOSIS — Z98.890 OTHER SPECIFIED POSTPROCEDURAL STATES: Chronic | ICD-10-CM

## 2024-01-12 DIAGNOSIS — Z00.8 ENCOUNTER FOR OTHER GENERAL EXAMINATION: ICD-10-CM

## 2024-01-12 DIAGNOSIS — Z98.891 HISTORY OF UTERINE SCAR FROM PREVIOUS SURGERY: Chronic | ICD-10-CM

## 2024-01-12 DIAGNOSIS — C50.911 MALIGNANT NEOPLASM OF UNSPECIFIED SITE OF RIGHT FEMALE BREAST: ICD-10-CM

## 2024-01-12 PROCEDURE — 77049 MRI BREAST C-+ W/CAD BI: CPT | Mod: 26

## 2024-01-12 PROCEDURE — A9585: CPT

## 2024-01-12 PROCEDURE — C8908: CPT

## 2024-01-12 PROCEDURE — C8937: CPT

## 2024-05-22 ENCOUNTER — OUTPATIENT (OUTPATIENT)
Dept: OUTPATIENT SERVICES | Facility: HOSPITAL | Age: 54
LOS: 1 days | Discharge: HOME | End: 2024-05-22

## 2024-05-22 DIAGNOSIS — Z98.890 OTHER SPECIFIED POSTPROCEDURAL STATES: Chronic | ICD-10-CM

## 2024-05-22 DIAGNOSIS — Z98.891 HISTORY OF UTERINE SCAR FROM PREVIOUS SURGERY: Chronic | ICD-10-CM

## 2024-05-22 DIAGNOSIS — Z90.11 ACQUIRED ABSENCE OF RIGHT BREAST AND NIPPLE: Chronic | ICD-10-CM

## 2024-05-22 DIAGNOSIS — C50.919 MALIGNANT NEOPLASM OF UNSPECIFIED SITE OF UNSPECIFIED FEMALE BREAST: ICD-10-CM

## 2024-05-22 DIAGNOSIS — Z90.12 ACQUIRED ABSENCE OF LEFT BREAST AND NIPPLE: Chronic | ICD-10-CM

## 2024-05-30 ENCOUNTER — APPOINTMENT (OUTPATIENT)
Dept: INFUSION THERAPY | Facility: HOSPITAL | Age: 54
End: 2024-05-30

## 2024-05-30 ENCOUNTER — RESULT REVIEW (OUTPATIENT)
Age: 54
End: 2024-05-30

## 2024-05-30 ENCOUNTER — APPOINTMENT (OUTPATIENT)
Dept: HEMATOLOGY ONCOLOGY | Facility: CLINIC | Age: 54
End: 2024-05-30
Payer: COMMERCIAL

## 2024-05-30 VITALS
TEMPERATURE: 97.2 F | OXYGEN SATURATION: 97 % | DIASTOLIC BLOOD PRESSURE: 74 MMHG | HEART RATE: 70 BPM | WEIGHT: 108 LBS | RESPIRATION RATE: 16 BRPM | SYSTOLIC BLOOD PRESSURE: 106 MMHG | BODY MASS INDEX: 19.75 KG/M2

## 2024-05-30 DIAGNOSIS — F41.9 ANXIETY DISORDER, UNSPECIFIED: ICD-10-CM

## 2024-05-30 DIAGNOSIS — T45.1X5A DRUG-INDUCED POLYNEUROPATHY: ICD-10-CM

## 2024-05-30 DIAGNOSIS — Z63.5 DISRUPTION OF FAMILY BY SEPARATION AND DIVORCE: ICD-10-CM

## 2024-05-30 DIAGNOSIS — G62.0 DRUG-INDUCED POLYNEUROPATHY: ICD-10-CM

## 2024-05-30 DIAGNOSIS — C50.911 MALIGNANT NEOPLASM OF UNSPECIFIED SITE OF RIGHT FEMALE BREAST: ICD-10-CM

## 2024-05-30 LAB
ALBUMIN SERPL ELPH-MCNC: 4.8 G/DL — SIGNIFICANT CHANGE UP (ref 3.3–5)
ALP SERPL-CCNC: 86 U/L — SIGNIFICANT CHANGE UP (ref 40–120)
ALT FLD-CCNC: <5 U/L — LOW (ref 10–45)
ANION GAP SERPL CALC-SCNC: 12 MMOL/L — SIGNIFICANT CHANGE UP (ref 5–17)
AST SERPL-CCNC: 23 U/L — SIGNIFICANT CHANGE UP (ref 10–40)
BASOPHILS # BLD AUTO: 0.01 K/UL — SIGNIFICANT CHANGE UP (ref 0–0.2)
BASOPHILS NFR BLD AUTO: 0.2 % — SIGNIFICANT CHANGE UP (ref 0–2)
BILIRUB SERPL-MCNC: 0.5 MG/DL — SIGNIFICANT CHANGE UP (ref 0.2–1.2)
BUN SERPL-MCNC: 10 MG/DL — SIGNIFICANT CHANGE UP (ref 7–23)
CALCIUM SERPL-MCNC: 9.4 MG/DL — SIGNIFICANT CHANGE UP (ref 8.4–10.5)
CEA SERPL-MCNC: 2.6 NG/ML — SIGNIFICANT CHANGE UP (ref 0–3.8)
CHLORIDE SERPL-SCNC: 107 MMOL/L — SIGNIFICANT CHANGE UP (ref 96–108)
CO2 SERPL-SCNC: 24 MMOL/L — SIGNIFICANT CHANGE UP (ref 22–31)
CREAT SERPL-MCNC: 0.71 MG/DL — SIGNIFICANT CHANGE UP (ref 0.5–1.3)
EGFR: 103 ML/MIN/1.73M2 — SIGNIFICANT CHANGE UP
EOSINOPHIL # BLD AUTO: 0.08 K/UL — SIGNIFICANT CHANGE UP (ref 0–0.5)
EOSINOPHIL NFR BLD AUTO: 1.5 % — SIGNIFICANT CHANGE UP (ref 0–6)
GLUCOSE SERPL-MCNC: 100 MG/DL — HIGH (ref 70–99)
HCT VFR BLD CALC: 42.5 % — SIGNIFICANT CHANGE UP (ref 34.5–45)
HGB BLD-MCNC: 13.5 G/DL — SIGNIFICANT CHANGE UP (ref 11.5–15.5)
IMM GRANULOCYTES NFR BLD AUTO: 0.4 % — SIGNIFICANT CHANGE UP (ref 0–0.9)
LYMPHOCYTES # BLD AUTO: 0.82 K/UL — LOW (ref 1–3.3)
LYMPHOCYTES # BLD AUTO: 14.9 % — SIGNIFICANT CHANGE UP (ref 13–44)
MCHC RBC-ENTMCNC: 30.9 PG — SIGNIFICANT CHANGE UP (ref 27–34)
MCHC RBC-ENTMCNC: 31.8 G/DL — LOW (ref 32–36)
MCV RBC AUTO: 97.3 FL — SIGNIFICANT CHANGE UP (ref 80–100)
MONOCYTES # BLD AUTO: 0.39 K/UL — SIGNIFICANT CHANGE UP (ref 0–0.9)
MONOCYTES NFR BLD AUTO: 7.1 % — SIGNIFICANT CHANGE UP (ref 2–14)
NEUTROPHILS # BLD AUTO: 4.19 K/UL — SIGNIFICANT CHANGE UP (ref 1.8–7.4)
NEUTROPHILS NFR BLD AUTO: 75.9 % — SIGNIFICANT CHANGE UP (ref 43–77)
NRBC # BLD: 0 /100 WBCS — SIGNIFICANT CHANGE UP (ref 0–0)
PLATELET # BLD AUTO: 263 K/UL — SIGNIFICANT CHANGE UP (ref 150–400)
POTASSIUM SERPL-MCNC: 4.2 MMOL/L — SIGNIFICANT CHANGE UP (ref 3.5–5.3)
POTASSIUM SERPL-SCNC: 4.2 MMOL/L — SIGNIFICANT CHANGE UP (ref 3.5–5.3)
PROT SERPL-MCNC: 7.3 G/DL — SIGNIFICANT CHANGE UP (ref 6–8.3)
RBC # BLD: 4.37 M/UL — SIGNIFICANT CHANGE UP (ref 3.8–5.2)
RBC # FLD: 13.1 % — SIGNIFICANT CHANGE UP (ref 10.3–14.5)
SODIUM SERPL-SCNC: 143 MMOL/L — SIGNIFICANT CHANGE UP (ref 135–145)
WBC # BLD: 5.51 K/UL — SIGNIFICANT CHANGE UP (ref 3.8–10.5)
WBC # FLD AUTO: 5.51 K/UL — SIGNIFICANT CHANGE UP (ref 3.8–10.5)

## 2024-05-30 PROCEDURE — 99214 OFFICE O/P EST MOD 30 MIN: CPT

## 2024-05-30 PROCEDURE — G2211 COMPLEX E/M VISIT ADD ON: CPT | Mod: NC

## 2024-05-30 SDOH — SOCIAL STABILITY - SOCIAL INSECURITY: DISRUPTION OF FAMILY BY SEPARATION AND DIVORCE: Z63.5

## 2024-05-30 NOTE — HISTORY OF PRESENT ILLNESS
[Disease: _____________________] : Disease: [unfilled] [T: ___] : T[unfilled] [N: ___] : N[unfilled] [M: ___] : M[unfilled] [AJCC Stage: ____] : AJCC Stage: [unfilled] [de-identified] : Right triple negative breast cancer diagnosed at the age of 51. 07/17/21 Mammogram and breast US showed a spiculated mass in the right breast at the 12 o'clock axis 5 cm FN.  09/17/21 Unilateral Mammogram and breast US showed a persistent spiculated mass in the right breast about 12 o'clock axis corresponding with a 1.5 x 1.0 x 0.8 irregular hypoechoic nodule with posterior shadowing. Also noted was a 1.3 x 0.6 x 1.2 cm hypoechoic nodule questionable irregularity 10 o'clock axis right breast 2 cm FN. Multiple other small nodules bilaterally suggesting complicated and simple cysts. Bilateral mild duct ectasia retroareolar region. BI-RADS 5. 10/19/21 Right breast 12 o'clock axis 3 cm FN core biopsy: ~0.9 cm invasive mammary carcinoma with apocrine features, moderately differentiated ER<1%, [place] 0%, HER-2 (-)   Right breast 10 o'clock axis 2 cm FN core biopsy: ~0.7 cm Invasive mammary carcinoma, moderately differentiated. 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   Lymphovascular invasion is identified. The invasive and in situ mammary carcinoma shows a ductal phenotype but diminished/loss of membranous staining.  11/11/21 [facility] Breast cancer stat panjcarletoel of 7 genes negative for pathogenic variants. 11/15/21 MRI Breast showed right upper central/slightly inner dominant enhancing mass measuring up to 3.3 cm and containing a biopsy clip, corresponding to one site of biopsy-proven malignancy. There is a 1.5 cm smaller irregular enhancing mass just inferior and lateral to the dominant aforementioned mass, corresponding to a second site of biopsy-proven malignancy. Small enhancing foci and non mass enhancement surrounding both masses, concerning for small satellite lesions and additional disease. There is 2.2 cm thick linear non mass enhancement more inferiorly in the right lateral 9:00 retroareolar location which extends to within 1.1 cm of the nipple. No MRI evidence of malignancy in the contralateral left breast. No lymphadenopathy. A 1.2 cm nonspecific T2 bright right hepatic lobe lesion which is incompletely visualized. Suggest dedicated liver MRI. 11/15/21 CT scan of C/A/P showed no imaging evidence of metastatic disease involving the chest, abdomen or pelvis. Two small cysts were noted in the right lobe of the liver but no suspicious lesions. Diffuse bladder wall thickening may indicate cystitis.  01/21/22 Right mastectomy and ALND by Dr. Cohen revealed a 2.1 cm IDC, SBR 6/9. Also noted was a 1.7 cm focus of DCIS, cribriform and solid types, intermediate nuclear grade with focal necrosis. Extensive lymphovascular invasion. 1/1 SLN positive with a 1.7 cm focus of disease. Right axillary lymph node dissection revealed 15/18 positive LN. She had immediate reconstruction with expander placement by Dr. Blane Ingram. 3/3/22 - 6/9/22 Adjuvant chemotherapy with dose dense Adriamycin (60 mg/m2) & Cytoxan (600 mg/m2) with Neulasta x 4 cycles followed by Taxol (175 mg/m2) x 4 cycles, all given every 2 weeks 7/24/22 - 8/15/22 Adjuvant radiation with 4240 cGy in 16 fractions with Dr. Moy. 9/12/22 - 3/2023 Adjuvant capecitabine 1500 mg bid 7 days on and 7 days off every 28 days with 6 cycles given high risk disease with 16/19 LN positive 12/28/22 Start Adjuvant Zometa 4 mg IV every 6 months with plan for three years of treatment. 4/21/23 Left mastectomy by Dr. Cohen. Bilateral internal mammary LN excision revealed a right internal mammary LN with metastatic adenocarcinoma, 0.3 cm 4/21/23 Bilateral SHAGGY flap reconstruction by Dr. Blane Ingram 3/2023 Completed 6 months of Xeloda. [de-identified] : 2.1 cm IDC, SBR 6/9 with 16/19 LN positive, no extranodal extension, triple negative [de-identified] : Idalmis continues on observation for Right triple negative breast cancer. She is overall doing well with mild residual numbness in the tips of two of her fingers but not her toes. She is doing well overall with no new symptoms and good energy.  She had dental clearance for Zometa and started 22 and will continue every 6 months for 3 years, with her 4th treatment today. On 23 CT scan of the C/A/P negative for malignancy.  She had surgery 23 with Left mastectomy and SHAGGY flap with Dr. Cohen and Dr. Ingram and a right internal mammary LN was positive for cancer. She has had anxiety for many years and is on Zyprexa, Effexor and Klonopin for this with Psychiatrist Dr Navin Canales. She is  with one son who is in 6th grade.  She works for [employer] in [employer], working remotely.   HEALTH MAINTENANCE: PCP: Addis Peres MD GYN: Royce Goldman MD Mammogram: s/p Bilateral mastectomy MRI of the breast: 24 BI-RADS 2  Pap smear:  negative Endometrial polypectomy: 21 Endometrial curetting showed endometrial epithelium with proliferative features. Fragments of endometrial poly and proliferative endometrium Colonoscopy: none, referral given to Dr. Duggan Genetic testin21 [facility] Breast cancer stat panel of 7 genes negative for pathogenic variants

## 2024-05-30 NOTE — PHYSICAL EXAM
[Fully active, able to carry on all pre-disease performance without restriction] : Status 0 - Fully active, able to carry on all pre-disease performance without restriction [Normal] : affect appropriate [de-identified] : s/p Bilateral mastectomies with SHAGGY flap reconstruction. No chest wall masses or lymphadenopathy

## 2024-05-31 LAB — CANCER AG27-29 SERPL-ACNC: 10.6 U/ML — SIGNIFICANT CHANGE UP (ref 0–38.6)

## 2024-11-03 ENCOUNTER — EMERGENCY (EMERGENCY)
Facility: HOSPITAL | Age: 54
LOS: 1 days | Discharge: HOME | End: 2024-11-03
Attending: EMERGENCY MEDICINE
Payer: COMMERCIAL

## 2024-11-03 VITALS
OXYGEN SATURATION: 96 % | RESPIRATION RATE: 16 BRPM | DIASTOLIC BLOOD PRESSURE: 70 MMHG | HEART RATE: 61 BPM | SYSTOLIC BLOOD PRESSURE: 107 MMHG | TEMPERATURE: 98 F

## 2024-11-03 VITALS
DIASTOLIC BLOOD PRESSURE: 66 MMHG | SYSTOLIC BLOOD PRESSURE: 95 MMHG | TEMPERATURE: 98 F | HEART RATE: 77 BPM | RESPIRATION RATE: 18 BRPM | OXYGEN SATURATION: 97 % | HEIGHT: 62 IN | WEIGHT: 108.03 LBS

## 2024-11-03 DIAGNOSIS — Z98.890 OTHER SPECIFIED POSTPROCEDURAL STATES: Chronic | ICD-10-CM

## 2024-11-03 LAB
ALBUMIN SERPL ELPH-MCNC: 4.7 G/DL — SIGNIFICANT CHANGE UP (ref 3.3–5)
ALP SERPL-CCNC: 462 U/L — HIGH (ref 40–120)
ALT FLD-CCNC: 148 U/L — HIGH (ref 10–45)
ANION GAP SERPL CALC-SCNC: 13 MMOL/L — SIGNIFICANT CHANGE UP (ref 5–17)
APPEARANCE UR: ABNORMAL
APTT BLD: 31.9 SEC — SIGNIFICANT CHANGE UP (ref 24.5–35.6)
AST SERPL-CCNC: 375 U/L — HIGH (ref 10–40)
BACTERIA # UR AUTO: NEGATIVE /HPF — SIGNIFICANT CHANGE UP
BASOPHILS # BLD AUTO: 0 K/UL — SIGNIFICANT CHANGE UP (ref 0–0.2)
BASOPHILS NFR BLD AUTO: 0 % — SIGNIFICANT CHANGE UP (ref 0–2)
BILIRUB SERPL-MCNC: 1.2 MG/DL — SIGNIFICANT CHANGE UP (ref 0.2–1.2)
BILIRUB UR-MCNC: ABNORMAL
BUN SERPL-MCNC: 17 MG/DL — SIGNIFICANT CHANGE UP (ref 7–23)
CALCIUM SERPL-MCNC: 9.3 MG/DL — SIGNIFICANT CHANGE UP (ref 8.4–10.5)
CAST: 5 /LPF — HIGH (ref 0–4)
CHLORIDE SERPL-SCNC: 108 MMOL/L — SIGNIFICANT CHANGE UP (ref 96–108)
CO2 SERPL-SCNC: 23 MMOL/L — SIGNIFICANT CHANGE UP (ref 22–31)
COLOR SPEC: SIGNIFICANT CHANGE UP
CREAT SERPL-MCNC: 0.79 MG/DL — SIGNIFICANT CHANGE UP (ref 0.5–1.3)
DIFF PNL FLD: ABNORMAL
EGFR: 90 ML/MIN/1.73M2 — SIGNIFICANT CHANGE UP
EOSINOPHIL # BLD AUTO: 0 K/UL — SIGNIFICANT CHANGE UP (ref 0–0.5)
EOSINOPHIL NFR BLD AUTO: 0 % — SIGNIFICANT CHANGE UP (ref 0–6)
FLUAV AG NPH QL: SIGNIFICANT CHANGE UP
FLUBV AG NPH QL: SIGNIFICANT CHANGE UP
GAS PNL BLDV: SIGNIFICANT CHANGE UP
GLUCOSE SERPL-MCNC: 96 MG/DL — SIGNIFICANT CHANGE UP (ref 70–99)
GLUCOSE UR QL: NEGATIVE MG/DL — SIGNIFICANT CHANGE UP
HCT VFR BLD CALC: 41.4 % — SIGNIFICANT CHANGE UP (ref 34.5–45)
HGB BLD-MCNC: 13.1 G/DL — SIGNIFICANT CHANGE UP (ref 11.5–15.5)
INR BLD: 1.08 RATIO — SIGNIFICANT CHANGE UP (ref 0.85–1.16)
KETONES UR-MCNC: ABNORMAL MG/DL
LEUKOCYTE ESTERASE UR-ACNC: ABNORMAL
LIDOCAIN IGE QN: 47 U/L — SIGNIFICANT CHANGE UP (ref 7–60)
LYMPHOCYTES # BLD AUTO: 0.5 K/UL — LOW (ref 1–3.3)
LYMPHOCYTES # BLD AUTO: 5.2 % — LOW (ref 13–44)
MAGNESIUM SERPL-MCNC: 2.2 MG/DL — SIGNIFICANT CHANGE UP (ref 1.6–2.6)
MANUAL SMEAR VERIFICATION: SIGNIFICANT CHANGE UP
MCHC RBC-ENTMCNC: 31 PG — SIGNIFICANT CHANGE UP (ref 27–34)
MCHC RBC-ENTMCNC: 31.6 G/DL — LOW (ref 32–36)
MCV RBC AUTO: 97.9 FL — SIGNIFICANT CHANGE UP (ref 80–100)
MONOCYTES # BLD AUTO: 0.16 K/UL — SIGNIFICANT CHANGE UP (ref 0–0.9)
MONOCYTES NFR BLD AUTO: 1.7 % — LOW (ref 2–14)
MUCOUS THREADS # UR AUTO: PRESENT
NEUTROPHILS # BLD AUTO: 8.86 K/UL — HIGH (ref 1.8–7.4)
NEUTROPHILS NFR BLD AUTO: 92.2 % — HIGH (ref 43–77)
NITRITE UR-MCNC: NEGATIVE — SIGNIFICANT CHANGE UP
PH UR: 6.5 — SIGNIFICANT CHANGE UP (ref 5–8)
PLAT MORPH BLD: NORMAL — SIGNIFICANT CHANGE UP
PLATELET # BLD AUTO: 222 K/UL — SIGNIFICANT CHANGE UP (ref 150–400)
POTASSIUM SERPL-MCNC: 3.8 MMOL/L — SIGNIFICANT CHANGE UP (ref 3.5–5.3)
POTASSIUM SERPL-SCNC: 3.8 MMOL/L — SIGNIFICANT CHANGE UP (ref 3.5–5.3)
PROT SERPL-MCNC: 7.7 G/DL — SIGNIFICANT CHANGE UP (ref 6–8.3)
PROT UR-MCNC: 30 MG/DL
PROTHROM AB SERPL-ACNC: 12.4 SEC — SIGNIFICANT CHANGE UP (ref 9.9–13.4)
RBC # BLD: 4.23 M/UL — SIGNIFICANT CHANGE UP (ref 3.8–5.2)
RBC # FLD: 11.9 % — SIGNIFICANT CHANGE UP (ref 10.3–14.5)
RBC BLD AUTO: SIGNIFICANT CHANGE UP
RBC CASTS # UR COMP ASSIST: 35 /HPF — HIGH (ref 0–4)
REVIEW: SIGNIFICANT CHANGE UP
RSV RNA NPH QL NAA+NON-PROBE: SIGNIFICANT CHANGE UP
SARS-COV-2 RNA SPEC QL NAA+PROBE: SIGNIFICANT CHANGE UP
SODIUM SERPL-SCNC: 144 MMOL/L — SIGNIFICANT CHANGE UP (ref 135–145)
SP GR SPEC: 1.03 — SIGNIFICANT CHANGE UP (ref 1–1.03)
SQUAMOUS # UR AUTO: 0 /HPF — SIGNIFICANT CHANGE UP (ref 0–5)
URATE CRY FLD QL MICRO: PRESENT
UROBILINOGEN FLD QL: 1 MG/DL — SIGNIFICANT CHANGE UP (ref 0.2–1)
VARIANT LYMPHS # BLD: 0.9 % — SIGNIFICANT CHANGE UP (ref 0–6)
WBC # BLD: 9.61 K/UL — SIGNIFICANT CHANGE UP (ref 3.8–10.5)
WBC # FLD AUTO: 9.61 K/UL — SIGNIFICANT CHANGE UP (ref 3.8–10.5)
WBC UR QL: 1 /HPF — SIGNIFICANT CHANGE UP (ref 0–5)

## 2024-11-03 PROCEDURE — 85018 HEMOGLOBIN: CPT

## 2024-11-03 PROCEDURE — 99285 EMERGENCY DEPT VISIT HI MDM: CPT

## 2024-11-03 PROCEDURE — 85014 HEMATOCRIT: CPT

## 2024-11-03 PROCEDURE — 83605 ASSAY OF LACTIC ACID: CPT

## 2024-11-03 PROCEDURE — 74177 CT ABD & PELVIS W/CONTRAST: CPT | Mod: MC

## 2024-11-03 PROCEDURE — 82803 BLOOD GASES ANY COMBINATION: CPT

## 2024-11-03 PROCEDURE — 85730 THROMBOPLASTIN TIME PARTIAL: CPT

## 2024-11-03 PROCEDURE — 74177 CT ABD & PELVIS W/CONTRAST: CPT | Mod: 26,MC

## 2024-11-03 PROCEDURE — 84295 ASSAY OF SERUM SODIUM: CPT

## 2024-11-03 PROCEDURE — 81001 URINALYSIS AUTO W/SCOPE: CPT

## 2024-11-03 PROCEDURE — 87086 URINE CULTURE/COLONY COUNT: CPT

## 2024-11-03 PROCEDURE — 93005 ELECTROCARDIOGRAM TRACING: CPT

## 2024-11-03 PROCEDURE — 82947 ASSAY GLUCOSE BLOOD QUANT: CPT

## 2024-11-03 PROCEDURE — 99285 EMERGENCY DEPT VISIT HI MDM: CPT | Mod: 25

## 2024-11-03 PROCEDURE — 87637 SARSCOV2&INF A&B&RSV AMP PRB: CPT

## 2024-11-03 PROCEDURE — 82330 ASSAY OF CALCIUM: CPT

## 2024-11-03 PROCEDURE — 83735 ASSAY OF MAGNESIUM: CPT

## 2024-11-03 PROCEDURE — 82435 ASSAY OF BLOOD CHLORIDE: CPT

## 2024-11-03 PROCEDURE — 80053 COMPREHEN METABOLIC PANEL: CPT

## 2024-11-03 PROCEDURE — 85025 COMPLETE CBC W/AUTO DIFF WBC: CPT

## 2024-11-03 PROCEDURE — 0241U: CPT

## 2024-11-03 PROCEDURE — 83690 ASSAY OF LIPASE: CPT

## 2024-11-03 PROCEDURE — 96374 THER/PROPH/DIAG INJ IV PUSH: CPT | Mod: XU

## 2024-11-03 PROCEDURE — 84132 ASSAY OF SERUM POTASSIUM: CPT

## 2024-11-03 PROCEDURE — 85610 PROTHROMBIN TIME: CPT

## 2024-11-03 RX ORDER — ACETAMINOPHEN 500 MG
725 TABLET ORAL ONCE
Refills: 0 | Status: DISCONTINUED | OUTPATIENT
Start: 2024-11-03 | End: 2024-11-03

## 2024-11-03 RX ORDER — SODIUM CHLORIDE 9 MG/ML
1000 INJECTION, SOLUTION INTRAMUSCULAR; INTRAVENOUS; SUBCUTANEOUS ONCE
Refills: 0 | Status: COMPLETED | OUTPATIENT
Start: 2024-11-03 | End: 2024-11-03

## 2024-11-03 RX ORDER — MORPHINE SULFATE 30 MG/1
2 TABLET, EXTENDED RELEASE ORAL ONCE
Refills: 0 | Status: DISCONTINUED | OUTPATIENT
Start: 2024-11-03 | End: 2024-11-03

## 2024-11-03 RX ADMIN — SODIUM CHLORIDE 1000 MILLILITER(S): 9 INJECTION, SOLUTION INTRAMUSCULAR; INTRAVENOUS; SUBCUTANEOUS at 19:00

## 2024-11-03 RX ADMIN — MORPHINE SULFATE 2 MILLIGRAM(S): 30 TABLET, EXTENDED RELEASE ORAL at 20:49

## 2024-11-03 RX ADMIN — MORPHINE SULFATE 2 MILLIGRAM(S): 30 TABLET, EXTENDED RELEASE ORAL at 21:09

## 2024-11-03 NOTE — ED PROVIDER NOTE - PHYSICAL EXAMINATION
Awake alert aware and in no acute distress.  Regular rate and rhythm.  Clear lungs.  The patient has a scar in a circular area around the umbilicus.  She has abdominal tenderness at and around the umbilicus above and to the left.

## 2024-11-03 NOTE — ED PROVIDER NOTE - CLINICAL SUMMARY MEDICAL DECISION MAKING FREE TEXT BOX
CBC CMP lipase urinalysis IV Zofran IV Tylenol CT abdomen pelvis with IV contrast -Moises Willis MD-

## 2024-11-03 NOTE — ED PROVIDER NOTE - CHILD ABUSE FACILITY
[facility] formerly [facility] Erythromycin Counseling:  I discussed with the patient the risks of erythromycin including but not limited to GI upset, allergic reaction, drug rash, diarrhea, increase in liver enzymes, and yeast infections.

## 2024-11-03 NOTE — ED PROVIDER NOTE - PROGRESS NOTE DETAILS
Patient is currently asymptomatic.  Her CT scan shows sclerotic lesions to the T and L-spine pelvis and femurs.  Currently she has no sign of any nerve entrapment or other secondary concerning's findings.  Her abdominal pain does not appear to be related to these lesions however it is possible that she may have had hypercalcemia at some point which was the cause of her pain.  She also has elevated LFTs and the abnormal finding on her cervix.  She states that she is due to see her OB/GYN and I advised her that she should follow-up this week with her OB/GYN.  She does have an oncologist Chris Lua and she would rather follow-up with her oncologist than stay in the hospital for for more testing.  She feels fine and does not need to stay for pain management.  I had this discussion with the patient and with her nephew at the bedside and the nephew was also going to assist her with making the appointments and showing up.  feeling better and wants to go home, reviewed case and results w pt, questions answered and pt understands, advised return precautions and care plan. I did send a teams message to Dr. Lua however she was not available for response at this hour of the evening.

## 2024-11-03 NOTE — ED PROVIDER NOTE - PATIENT PORTAL LINK FT
You can access the FollowMyHealth Patient Portal offered by [facility] by registering at the following website: [email or web address]. By joining [employer] [facility]’s FollowMyHealth portal, you will also be able to view your health information using other applications (apps) compatible with our system.

## 2024-11-03 NOTE — ED PROVIDER NOTE - OBJECTIVE STATEMENT
This is a 54-year-old female with a history of stage III breast cancer status post surgery radiation and chemotherapy who is now coming in with about a month of intermittent abdominal pain.  It is periumbilical and left of the umbilicus and it is associated with nausea and vomiting.  No diarrhea.  No fevers or chills.  The episodes last for 6 hours.  She had fairly severe pain earlier in the day this morning when it started and since then she feels mostly better.  There is no trauma or injury.  She has an appointment with a GI specialist in 2 weeks.  No UTI symptoms.  No cough or breathing troubles.  No bleeding.

## 2024-11-03 NOTE — ED PROVIDER NOTE - NSFOLLOWUPINSTRUCTIONS_ED_ALL_ED_FT
IMPORTANT INSTRUCTIONS FROM Dr. BRINK:    Your CT scan shows findings worrisome for metastasis of cancer.  You need to see your oncologist this week.  There is also abnormal finding on your cervix and you need to see your OB/GYN within a week as well.  If you are unable to see these doctors please return to the emergency department at once or call us at the number at the top of this sheet for assistance in making an appointment.  If not treated in a timely period this can become life-threatening.    Please follow up with your personal medical doctor in 24-48 hours.   Bring results from today to your visit.    If you were advised to take any medications - be sure to review the package insert.    If your symptoms change, get worse or if you have any new symptoms, come to the ER right away.  If you have any questions, call the ER at the phone number on this page.

## 2024-11-03 NOTE — ED ADULT NURSE NOTE - OBJECTIVE STATEMENT
Pt is a 55 y/o female with PMH breast cancer presenting to the ED c/o periumbilical abdominal pain intermittently with nausea and nonbloody vomiting x1 month. Pt  denies known exacerbating factors, states was vomiting today prior to arrival, nausea has subsided at this time. Pt denies fever, chills, back pain, chest pain, dysuria, hematuria, constipation, diarrhea.

## 2024-11-03 NOTE — ED ADULT NURSE NOTE - CAS ELECT INFOMATION PROVIDED
patient educated to followup with oncology and GYN provider outpatient, return to ED with worsening s/s. Pt and family verbalizes understanding./DC instructions

## 2024-11-04 ENCOUNTER — APPOINTMENT (OUTPATIENT)
Dept: OBGYN | Facility: CLINIC | Age: 54
End: 2024-11-04
Payer: COMMERCIAL

## 2024-11-04 ENCOUNTER — ASOB RESULT (OUTPATIENT)
Age: 54
End: 2024-11-04

## 2024-11-04 ENCOUNTER — APPOINTMENT (OUTPATIENT)
Dept: ANTEPARTUM | Facility: CLINIC | Age: 54
End: 2024-11-04

## 2024-11-04 VITALS — WEIGHT: 108 LBS | SYSTOLIC BLOOD PRESSURE: 104 MMHG | DIASTOLIC BLOOD PRESSURE: 71 MMHG | BODY MASS INDEX: 19.75 KG/M2

## 2024-11-04 LAB
CULTURE RESULTS: SIGNIFICANT CHANGE UP
SPECIMEN SOURCE: SIGNIFICANT CHANGE UP

## 2024-11-04 PROCEDURE — 76857 US EXAM PELVIC LIMITED: CPT

## 2024-11-04 PROCEDURE — 76830 TRANSVAGINAL US NON-OB: CPT

## 2024-11-04 PROCEDURE — 99459 PELVIC EXAMINATION: CPT

## 2024-11-04 PROCEDURE — 58100 BIOPSY OF UTERUS LINING: CPT

## 2024-11-04 PROCEDURE — 99396 PREV VISIT EST AGE 40-64: CPT

## 2024-11-04 PROCEDURE — 36415 COLL VENOUS BLD VENIPUNCTURE: CPT

## 2024-11-04 RX ORDER — OXYCODONE 5 MG/1
5 TABLET ORAL
Qty: 30 | Refills: 0 | Status: ACTIVE | COMMUNITY
Start: 2024-11-04 | End: 1900-01-01

## 2024-11-06 DIAGNOSIS — B37.9 CANDIDIASIS, UNSPECIFIED: ICD-10-CM

## 2024-11-06 LAB
AFP-TM SERPL-MCNC: 3.6 NG/ML
C TRACH RRNA SPEC QL NAA+PROBE: NOT DETECTED
CANCER AG125 SERPL-ACNC: 14 U/ML
CANCER AG15-3 SERPL-ACNC: 10.8 U/ML
CANCER AG19-9 SERPL-ACNC: 8 U/ML
CANDIDA VAG CYTO: DETECTED
G VAGINALIS+PREV SP MTYP VAG QL MICRO: NOT DETECTED
HCG-TM SERPL-MCNC: <1 MIU/ML
HPV 16 E6+E7 MRNA CVX QL NAA+PROBE: NOT DETECTED
HPV HIGH+LOW RISK DNA PNL CVX: NOT DETECTED
HPV18+45 E6+E7 MRNA CVX QL NAA+PROBE: NOT DETECTED
LDH SERPL-CCNC: 232 U/L
N GONORRHOEA RRNA SPEC QL NAA+PROBE: NOT DETECTED
SOURCE AMPLIFICATION: NORMAL
T VAGINALIS VAG QL WET PREP: NOT DETECTED

## 2024-11-07 RX ORDER — FLUCONAZOLE 150 MG/1
150 TABLET ORAL
Qty: 2 | Refills: 0 | Status: ACTIVE | COMMUNITY
Start: 2024-11-06 | End: 1900-01-01

## 2024-11-08 ENCOUNTER — OUTPATIENT (OUTPATIENT)
Dept: OUTPATIENT SERVICES | Facility: HOSPITAL | Age: 54
LOS: 1 days | Discharge: HOME | End: 2024-11-08

## 2024-11-08 DIAGNOSIS — Z98.891 HISTORY OF UTERINE SCAR FROM PREVIOUS SURGERY: Chronic | ICD-10-CM

## 2024-11-08 DIAGNOSIS — Z98.890 OTHER SPECIFIED POSTPROCEDURAL STATES: Chronic | ICD-10-CM

## 2024-11-08 DIAGNOSIS — C50.919 MALIGNANT NEOPLASM OF UNSPECIFIED SITE OF UNSPECIFIED FEMALE BREAST: ICD-10-CM

## 2024-11-08 DIAGNOSIS — Z90.12 ACQUIRED ABSENCE OF LEFT BREAST AND NIPPLE: Chronic | ICD-10-CM

## 2024-11-08 DIAGNOSIS — Z90.11 ACQUIRED ABSENCE OF RIGHT BREAST AND NIPPLE: Chronic | ICD-10-CM

## 2024-11-09 LAB — CYTOLOGY CVX/VAG DOC THIN PREP: NORMAL

## 2024-11-11 ENCOUNTER — RESULT REVIEW (OUTPATIENT)
Age: 54
End: 2024-11-11

## 2024-11-11 ENCOUNTER — APPOINTMENT (OUTPATIENT)
Dept: HEMATOLOGY ONCOLOGY | Facility: CLINIC | Age: 54
End: 2024-11-11
Payer: COMMERCIAL

## 2024-11-11 VITALS
WEIGHT: 108 LBS | RESPIRATION RATE: 17 BRPM | DIASTOLIC BLOOD PRESSURE: 62 MMHG | TEMPERATURE: 97.3 F | BODY MASS INDEX: 19.76 KG/M2 | HEART RATE: 76 BPM | SYSTOLIC BLOOD PRESSURE: 98 MMHG | OXYGEN SATURATION: 97 %

## 2024-11-11 DIAGNOSIS — N83.8 OTHER NONINFLAMMATORY DISORDERS OF OVARY, FALLOPIAN TUBE AND BROAD LIGAMENT: ICD-10-CM

## 2024-11-11 DIAGNOSIS — R10.13 EPIGASTRIC PAIN: ICD-10-CM

## 2024-11-11 DIAGNOSIS — Z01.419 ENCOUNTER FOR GYNECOLOGICAL EXAMINATION (GENERAL) (ROUTINE) W/OUT ABNORMAL FINDINGS: ICD-10-CM

## 2024-11-11 PROBLEM — N94.89 ENDOMETRIAL MASS: Status: ACTIVE | Noted: 2024-11-04

## 2024-11-11 LAB
BASOPHILS # BLD AUTO: 0.03 K/UL — SIGNIFICANT CHANGE UP (ref 0–0.2)
BASOPHILS NFR BLD AUTO: 0.4 % — SIGNIFICANT CHANGE UP (ref 0–2)
EOSINOPHIL # BLD AUTO: 0.12 K/UL — SIGNIFICANT CHANGE UP (ref 0–0.5)
EOSINOPHIL NFR BLD AUTO: 1.5 % — SIGNIFICANT CHANGE UP (ref 0–6)
HCT VFR BLD CALC: 39.5 % — SIGNIFICANT CHANGE UP (ref 34.5–45)
HGB BLD-MCNC: 13 G/DL — SIGNIFICANT CHANGE UP (ref 11.5–15.5)
IMM GRANULOCYTES NFR BLD AUTO: 0.5 % — SIGNIFICANT CHANGE UP (ref 0–0.9)
LYMPHOCYTES # BLD AUTO: 0.81 K/UL — LOW (ref 1–3.3)
LYMPHOCYTES # BLD AUTO: 10.3 % — LOW (ref 13–44)
MCHC RBC-ENTMCNC: 32 PG — SIGNIFICANT CHANGE UP (ref 27–34)
MCHC RBC-ENTMCNC: 32.9 G/DL — SIGNIFICANT CHANGE UP (ref 32–36)
MCV RBC AUTO: 97.3 FL — SIGNIFICANT CHANGE UP (ref 80–100)
MONOCYTES # BLD AUTO: 0.5 K/UL — SIGNIFICANT CHANGE UP (ref 0–0.9)
MONOCYTES NFR BLD AUTO: 6.4 % — SIGNIFICANT CHANGE UP (ref 2–14)
NEUTROPHILS # BLD AUTO: 6.37 K/UL — SIGNIFICANT CHANGE UP (ref 1.8–7.4)
NEUTROPHILS NFR BLD AUTO: 80.9 % — HIGH (ref 43–77)
NRBC # BLD: 0 /100 WBCS — SIGNIFICANT CHANGE UP (ref 0–0)
NRBC BLD-RTO: 0 /100 WBCS — SIGNIFICANT CHANGE UP (ref 0–0)
PLATELET # BLD AUTO: 220 K/UL — SIGNIFICANT CHANGE UP (ref 150–400)
RBC # BLD: 4.06 M/UL — SIGNIFICANT CHANGE UP (ref 3.8–5.2)
RBC # FLD: 11.9 % — SIGNIFICANT CHANGE UP (ref 10.3–14.5)
WBC # BLD: 7.87 K/UL — SIGNIFICANT CHANGE UP (ref 3.8–10.5)
WBC # FLD AUTO: 7.87 K/UL — SIGNIFICANT CHANGE UP (ref 3.8–10.5)

## 2024-11-11 PROCEDURE — 99214 OFFICE O/P EST MOD 30 MIN: CPT

## 2024-11-11 PROCEDURE — G2211 COMPLEX E/M VISIT ADD ON: CPT | Mod: NC

## 2024-11-12 ENCOUNTER — APPOINTMENT (OUTPATIENT)
Dept: GASTROENTEROLOGY | Facility: CLINIC | Age: 54
End: 2024-11-12
Payer: COMMERCIAL

## 2024-11-12 VITALS
DIASTOLIC BLOOD PRESSURE: 67 MMHG | HEART RATE: 77 BPM | SYSTOLIC BLOOD PRESSURE: 97 MMHG | WEIGHT: 108 LBS | BODY MASS INDEX: 19.88 KG/M2 | HEIGHT: 62 IN | OXYGEN SATURATION: 97 %

## 2024-11-12 DIAGNOSIS — K81.0 ACUTE CHOLECYSTITIS: ICD-10-CM

## 2024-11-12 DIAGNOSIS — K80.10 CALCULUS OF GALLBLADDER WITH CHRONIC CHOLECYSTITIS W/OUT OBSTRUCTION: ICD-10-CM

## 2024-11-12 LAB
ALBUMIN SERPL ELPH-MCNC: 4.7 G/DL
ALP BLD-CCNC: 360 U/L
ALT SERPL-CCNC: 72 U/L
ANION GAP SERPL CALC-SCNC: 15 MMOL/L
AST SERPL-CCNC: 44 U/L
BILIRUB SERPL-MCNC: 0.3 MG/DL
BUN SERPL-MCNC: 8 MG/DL
CALCIUM SERPL-MCNC: 9.4 MG/DL
CANCER AG27-29 SERPL-ACNC: 11.8 U/ML
CEA SERPL-MCNC: 2.7 NG/ML
CHLORIDE SERPL-SCNC: 105 MMOL/L
CO2 SERPL-SCNC: 24 MMOL/L
CREAT SERPL-MCNC: 0.74 MG/DL
EGFR: 97 ML/MIN/1.73M2
GLUCOSE SERPL-MCNC: 108 MG/DL
INR PPP: 0.98 RATIO
POTASSIUM SERPL-SCNC: 4 MMOL/L
PROT SERPL-MCNC: 7.1 G/DL
PT BLD: 11.6 SEC
SODIUM SERPL-SCNC: 144 MMOL/L

## 2024-11-12 PROCEDURE — 99203 OFFICE O/P NEW LOW 30 MIN: CPT

## 2024-11-14 ENCOUNTER — APPOINTMENT (OUTPATIENT)
Dept: NUCLEAR MEDICINE | Facility: IMAGING CENTER | Age: 54
End: 2024-11-14
Payer: COMMERCIAL

## 2024-11-14 ENCOUNTER — OUTPATIENT (OUTPATIENT)
Dept: OUTPATIENT SERVICES | Facility: HOSPITAL | Age: 54
LOS: 1 days | End: 2024-11-14
Payer: COMMERCIAL

## 2024-11-14 DIAGNOSIS — Z90.12 ACQUIRED ABSENCE OF LEFT BREAST AND NIPPLE: Chronic | ICD-10-CM

## 2024-11-14 DIAGNOSIS — Z98.890 OTHER SPECIFIED POSTPROCEDURAL STATES: Chronic | ICD-10-CM

## 2024-11-14 DIAGNOSIS — Z98.891 HISTORY OF UTERINE SCAR FROM PREVIOUS SURGERY: Chronic | ICD-10-CM

## 2024-11-14 DIAGNOSIS — Z90.11 ACQUIRED ABSENCE OF RIGHT BREAST AND NIPPLE: Chronic | ICD-10-CM

## 2024-11-14 DIAGNOSIS — C50.911 MALIGNANT NEOPLASM OF UNSPECIFIED SITE OF RIGHT FEMALE BREAST: ICD-10-CM

## 2024-11-14 LAB — INHIBIN B: <7 PG/ML

## 2024-11-14 PROCEDURE — 78815 PET IMAGE W/CT SKULL-THIGH: CPT

## 2024-11-14 PROCEDURE — 78815 PET IMAGE W/CT SKULL-THIGH: CPT | Mod: 26,PI

## 2024-11-14 PROCEDURE — A9552: CPT

## 2024-11-16 LAB — CORE LAB BIOPSY: NORMAL

## 2024-11-18 ENCOUNTER — APPOINTMENT (OUTPATIENT)
Dept: GYNECOLOGIC ONCOLOGY | Facility: CLINIC | Age: 54
End: 2024-11-18

## 2024-11-19 ENCOUNTER — APPOINTMENT (OUTPATIENT)
Dept: INTERVENTIONAL RADIOLOGY/VASCULAR | Facility: CLINIC | Age: 54
End: 2024-11-19
Payer: COMMERCIAL

## 2024-11-19 DIAGNOSIS — B37.9 CANDIDIASIS, UNSPECIFIED: ICD-10-CM

## 2024-11-19 DIAGNOSIS — M89.9 DISORDER OF BONE, UNSPECIFIED: ICD-10-CM

## 2024-11-19 DIAGNOSIS — C50.911 MALIGNANT NEOPLASM OF UNSPECIFIED SITE OF RIGHT FEMALE BREAST: ICD-10-CM

## 2024-11-19 DIAGNOSIS — N94.89 OTHER SPECIFIED CONDITIONS ASSOCIATED WITH FEMALE GENITAL ORGANS AND MENSTRUAL CYCLE: ICD-10-CM

## 2024-11-19 DIAGNOSIS — N83.8 OTHER NONINFLAMMATORY DISORDERS OF OVARY, FALLOPIAN TUBE AND BROAD LIGAMENT: ICD-10-CM

## 2024-11-19 PROCEDURE — 99203 OFFICE O/P NEW LOW 30 MIN: CPT

## 2024-12-10 ENCOUNTER — APPOINTMENT (OUTPATIENT)
Dept: HEMATOLOGY ONCOLOGY | Facility: CLINIC | Age: 54
End: 2024-12-10

## 2024-12-10 ENCOUNTER — APPOINTMENT (OUTPATIENT)
Dept: INFUSION THERAPY | Facility: HOSPITAL | Age: 54
End: 2024-12-10

## 2025-01-28 NOTE — H&P PST ADULT - ENDOCRINE
Chief Complaint   Patient presents with    Painful Urination     Pt reports recent dx of UTI and finished ABX. Pt reports symptoms have not improved since for the past few weeks.     Fatigue    Abdominal Pain    Nausea     Explained to pt triage process, made pt aware to tell this RN/staff of any changes/concerns, pt verbalized understanding of process and instructions given. Pt to ER lobby.      
negative

## (undated) DEVICE — DRAIN BLAKE 15FR BARD CHANNEL

## (undated) DEVICE — DRAPE LIGHT HANDLE COVER (BLUE)

## (undated) DEVICE — DRAPE 3/4 SHEET 52X76"

## (undated) DEVICE — ELCTR GROUNDING PAD ADULT COVIDIEN

## (undated) DEVICE — CANISTER SUCTION 3000ML

## (undated) DEVICE — GOWN IMPERV XL

## (undated) DEVICE — LABELS BLANK W PEN

## (undated) DEVICE — PACK MAJOR ABDOMINAL WITH LAP

## (undated) DEVICE — CANISTER SUCTION LID GUARD 3000CC

## (undated) DEVICE — LAP PAD 18 X 18"

## (undated) DEVICE — BLADE SCALPEL SAFETYLOCK #10

## (undated) DEVICE — STAPLER SKIN VISI-STAT 35 WIDE

## (undated) DEVICE — DRAPE TOWEL BLUE 17" X 24"

## (undated) DEVICE — STOPCOCK 3 WAY W SWIVEL MALE LUER LOCK

## (undated) DEVICE — GLV 7 PROTEXIS (WHITE)

## (undated) DEVICE — PACK MINOR

## (undated) DEVICE — DRAPE INSTRUMENT POUCH 6.75" X 11"

## (undated) DEVICE — DRSG MAMMARY SUPPORT XL SIZE 5

## (undated) DEVICE — SOL IRR POUR NS 0.9% 500ML

## (undated) DEVICE — VENODYNE/SCD SLEEVE CALF MEDIUM

## (undated) DEVICE — SYR LUER LOK 50CC

## (undated) DEVICE — WARMING BLANKET FULL UNDERBODY

## (undated) DEVICE — ONETRAC LIGHTED RETRACTOR 135 X 30MM DISP

## (undated) DEVICE — DRAPE 1/2 SHEET 40X57"

## (undated) DEVICE — DRSG BIOPATCH DISK W CHG 1" W 4.0MM HOLE

## (undated) DEVICE — SUT MONOCRYL 3-0 27" PS-2 UNDYED

## (undated) DEVICE — ELCTR PLASMA BLADE X 3.0S WIDE TIP

## (undated) DEVICE — WARMING BLANKET LOWER ADULT

## (undated) DEVICE — DRAPE IOBAN 23" X 23"

## (undated) DEVICE — DRSG MAMMARY SUPPORT MED SIZE 3

## (undated) DEVICE — DRAIN JACKSON PRATT 10MM FLAT FULL NO TROCAR

## (undated) DEVICE — FOLEY TRAY 16FR 5CC LTX UMETER CLOSED

## (undated) DEVICE — DRSG DERMABOND 0.7ML

## (undated) DEVICE — BLADE SCALPEL SAFETYLOCK #15

## (undated) DEVICE — ELCTR BOVIE TIP BLADE INSULATED 2.75" EDGE WITH SAFETY

## (undated) DEVICE — DRSG STERISTRIPS 0.5 X 4"

## (undated) DEVICE — BRA PINK MED 33-36"

## (undated) DEVICE — SUT SOFSILK 2-0 18" C-23

## (undated) DEVICE — TUBING IRR SET FOR CYSTOSCOPY 77"

## (undated) DEVICE — SOL IRR POUR H2O 250ML

## (undated) DEVICE — MARKING PEN W RULER

## (undated) DEVICE — SUT MONOSOF 4-0 18" P-13 UNDYED

## (undated) DEVICE — DRSG TEGADERM 6"X8"

## (undated) DEVICE — SOL IRR POUR NS 0.9% 1000ML

## (undated) DEVICE — SPECIMEN CONTAINER 100ML

## (undated) DEVICE — LIGASURE SMALL JAW

## (undated) DEVICE — DRSG MAMMARY SUPPORT MED SIZE 2

## (undated) DEVICE — SYR ASEPTO

## (undated) DEVICE — DRSG MAMMARY SUPPORT LG SIZE 4

## (undated) DEVICE — DRSG COMBINE 5X9"

## (undated) DEVICE — SUT MONOCRYL 3-0 27" KS CS-1 UNDYED

## (undated) DEVICE — POSITIONER FOAM EGG CRATE ULNAR 2PCS (PINK)

## (undated) DEVICE — PREP ALCOHOL PAD MED

## (undated) DEVICE — SUCTION YANKAUER NO CONTROL VENT

## (undated) DEVICE — POSITIONER STRAP ARMBOARD VELCRO TS-30

## (undated) DEVICE — BASIN SET DOUBLE

## (undated) DEVICE — NDL HYPO SAFE 18G X 1.5" (PINK)

## (undated) DEVICE — WARMING BLANKET FULL ADULT

## (undated) DEVICE — BLADE SCALPEL SAFETY #15 WITH PLASTIC GREEN HANDLE

## (undated) DEVICE — SUT SILK 2-0 18" FS

## (undated) DEVICE — GLV 7.5 PROTEXIS (WHITE)

## (undated) DEVICE — DRAIN RESERVOIR FOR JACKSON PRATT 100CC CARDINAL

## (undated) DEVICE — TUBING SUCTION 20FT

## (undated) DEVICE — SYR LUER LOK 10CC

## (undated) DEVICE — SUT VICRYL 3-0 27" SH UNDYED

## (undated) DEVICE — GLV 7.5 PROTEXIS (CREAM) MICRO

## (undated) DEVICE — DRSG CURITY GAUZE SPONGE 4 X 4" 12-PLY

## (undated) DEVICE — Device

## (undated) DEVICE — DRSG MAMMARY SUPPORT SM SIZE 1

## (undated) DEVICE — STAPLER SKIN MULTI DIRECTION W35

## (undated) DEVICE — SUT VICRYL 3-0 18" PS-2 UNDYED

## (undated) DEVICE — MEDICATION LABELS W MARKER

## (undated) DEVICE — SUT MONOCRYL 3-0 18" PS-2 UNDYED

## (undated) DEVICE — BASIN AMBULATORY

## (undated) DEVICE — PREP BETADINE SPONGE STICKS

## (undated) DEVICE — DRAPE CHEST BREAST 106" X 122"

## (undated) DEVICE — CANISTER DISPOSABLE THIN WALL 3000CC

## (undated) DEVICE — VENODYNE/SCD SLEEVE CALF LARGE

## (undated) DEVICE — BAG DECANTER DISP

## (undated) DEVICE — GLV 6 PROTEXIS (WHITE)

## (undated) DEVICE — GOWN TRIMAX LG

## (undated) DEVICE — SOL INJ LR 500ML

## (undated) DEVICE — DRAIN JACKSON PRATT 7MM FLAT FULL NO TROCAR

## (undated) DEVICE — POSITIONER PATIENT SAFETY STRAP 3X60"

## (undated) DEVICE — SUT VICRYL 3-0 18" SH UNDYED (POP-OFF)

## (undated) DEVICE — PACK BASIN SPECIAL PROCEDURE

## (undated) DEVICE — GLV 6.5 PROTEXIS (WHITE)

## (undated) DEVICE — SUT QUILL MONODERM 2-0 30CM 19MM

## (undated) DEVICE — SOL IRR BAG NS 0.9% 1000ML

## (undated) DEVICE — SUT VICRYL 2-0 27" SH UNDYED

## (undated) DEVICE — SUT POLYSORB 2-0 30" V-20 UNDYED

## (undated) DEVICE — PREP CHLORAPREP HI-LITE ORANGE 26ML

## (undated) DEVICE — DRSG BENZOIN 0.6CC

## (undated) DEVICE — KELLER FUNNEL

## (undated) DEVICE — ELCTR BOVIE TIP BLADE INSULATED 2.75" EDGE

## (undated) DEVICE — DRSG XEROFORM 5 X 9"

## (undated) DEVICE — DRAPE MAYO STAND 30"

## (undated) DEVICE — SPONGE PEANUT AUTO COUNT

## (undated) DEVICE — GAMMA SLEEVE DISPOSABLE

## (undated) DEVICE — DRAPE SHOWER CURTAIN ISOLATION

## (undated) DEVICE — VISITEC 4X4

## (undated) DEVICE — DRSG TELFA 3 X 8

## (undated) DEVICE — NDL HYPO SAFE 25G X 1.5" (ORANGE)

## (undated) DEVICE — ONETRAC LIGHTED RETRACTOR 90 X 22MM DISP

## (undated) DEVICE — SUT MONOCRYL 4-0 27" PS-2 UNDYED

## (undated) DEVICE — DRSG BIOPATCH DISK W CHG 1" W 7.0MM HOLE

## (undated) DEVICE — PACK BREAST RECONSTRUCTION

## (undated) DEVICE — DRAPE LAPAROTOMY TRANSVERSE

## (undated) DEVICE — SUT POLYSORB 0 30" GS-21 UNDYED